# Patient Record
Sex: FEMALE | Race: WHITE | NOT HISPANIC OR LATINO | Employment: OTHER | URBAN - METROPOLITAN AREA
[De-identification: names, ages, dates, MRNs, and addresses within clinical notes are randomized per-mention and may not be internally consistent; named-entity substitution may affect disease eponyms.]

---

## 2017-02-14 ENCOUNTER — LAB (OUTPATIENT)
Dept: INTERNAL MEDICINE | Facility: CLINIC | Age: 67
End: 2017-02-14

## 2017-02-14 DIAGNOSIS — E78.1 PURE HYPERGLYCERIDEMIA: Primary | ICD-10-CM

## 2017-02-14 DIAGNOSIS — M85.80 OSTEOPENIA: ICD-10-CM

## 2017-02-14 DIAGNOSIS — Z00.00 ANNUAL PHYSICAL EXAM: ICD-10-CM

## 2017-02-14 DIAGNOSIS — R94.6 ABNORMAL FINDING ON THYROID FUNCTION TEST: ICD-10-CM

## 2017-02-14 DIAGNOSIS — R73.01 IMPAIRED FASTING GLUCOSE: ICD-10-CM

## 2017-02-14 LAB
ALBUMIN SERPL-MCNC: 4.4 G/DL (ref 3.2–4.8)
ALBUMIN/GLOB SERPL: 2.1 G/DL (ref 1.5–2.5)
ALP SERPL-CCNC: 56 U/L (ref 25–100)
ALT SERPL W P-5'-P-CCNC: 17 U/L (ref 7–40)
ANION GAP SERPL CALCULATED.3IONS-SCNC: 7 MMOL/L (ref 3–11)
ARTICHOKE IGE QN: 110 MG/DL (ref 0–130)
AST SERPL-CCNC: 18 U/L (ref 0–33)
BASOPHILS # BLD AUTO: 0.06 10*3/MM3 (ref 0–0.2)
BASOPHILS NFR BLD AUTO: 0.6 % (ref 0–1)
BILIRUB SERPL-MCNC: 0.5 MG/DL (ref 0.3–1.2)
BILIRUB UR QL STRIP: NEGATIVE
BUN BLD-MCNC: 13 MG/DL (ref 9–23)
BUN/CREAT SERPL: 18.6 (ref 7–25)
CALCIUM SPEC-SCNC: 9.9 MG/DL (ref 8.7–10.4)
CHLORIDE SERPL-SCNC: 101 MMOL/L (ref 99–109)
CHOLEST SERPL-MCNC: 215 MG/DL (ref 0–200)
CLARITY UR: CLEAR
CO2 SERPL-SCNC: 30 MMOL/L (ref 20–31)
COLOR UR: YELLOW
CREAT BLD-MCNC: 0.7 MG/DL (ref 0.6–1.3)
DEPRECATED RDW RBC AUTO: 46.1 FL (ref 37–54)
EOSINOPHIL # BLD AUTO: 0.94 10*3/MM3 (ref 0.1–0.3)
EOSINOPHIL NFR BLD AUTO: 8.8 % (ref 0–3)
ERYTHROCYTE [DISTWIDTH] IN BLOOD BY AUTOMATED COUNT: 13.1 % (ref 11.3–14.5)
GFR SERPL CREATININE-BSD FRML MDRD: 83 ML/MIN/1.73
GLOBULIN UR ELPH-MCNC: 2.1 GM/DL
GLUCOSE BLD-MCNC: 98 MG/DL (ref 70–100)
GLUCOSE UR STRIP-MCNC: NEGATIVE MG/DL
HBA1C MFR BLD: 6.1 % (ref 4.8–5.6)
HCT VFR BLD AUTO: 38.7 % (ref 34.5–44)
HCV AB SER DONR QL: NORMAL
HDLC SERPL-MCNC: 88 MG/DL (ref 40–60)
HGB BLD-MCNC: 12.7 G/DL (ref 11.5–15.5)
HGB UR QL STRIP.AUTO: NEGATIVE
IMM GRANULOCYTES # BLD: 0.05 10*3/MM3 (ref 0–0.03)
IMM GRANULOCYTES NFR BLD: 0.5 % (ref 0–0.6)
KETONES UR QL STRIP: NEGATIVE
LEUKOCYTE ESTERASE UR QL STRIP.AUTO: NEGATIVE
LYMPHOCYTES # BLD AUTO: 1.7 10*3/MM3 (ref 0.6–4.8)
LYMPHOCYTES NFR BLD AUTO: 15.9 % (ref 24–44)
MCH RBC QN AUTO: 31.6 PG (ref 27–31)
MCHC RBC AUTO-ENTMCNC: 32.8 G/DL (ref 32–36)
MCV RBC AUTO: 96.3 FL (ref 80–99)
MONOCYTES # BLD AUTO: 0.9 10*3/MM3 (ref 0–1)
MONOCYTES NFR BLD AUTO: 8.4 % (ref 0–12)
NEUTROPHILS # BLD AUTO: 7.02 10*3/MM3 (ref 1.5–8.3)
NEUTROPHILS NFR BLD AUTO: 65.8 % (ref 41–71)
NITRITE UR QL STRIP: NEGATIVE
PH UR STRIP.AUTO: 7 [PH] (ref 5–8)
PLATELET # BLD AUTO: 341 10*3/MM3 (ref 150–450)
PMV BLD AUTO: 10.5 FL (ref 6–12)
POTASSIUM BLD-SCNC: 4.3 MMOL/L (ref 3.5–5.5)
PROT SERPL-MCNC: 6.5 G/DL (ref 5.7–8.2)
PROT UR QL STRIP: NEGATIVE
RBC # BLD AUTO: 4.02 10*6/MM3 (ref 3.89–5.14)
SODIUM BLD-SCNC: 138 MMOL/L (ref 132–146)
SP GR UR STRIP: 1.01 (ref 1–1.03)
TRIGL SERPL-MCNC: 67 MG/DL (ref 0–150)
TSH SERPL DL<=0.05 MIU/L-ACNC: 3.9 MIU/ML (ref 0.35–5.35)
UROBILINOGEN UR QL STRIP: NORMAL
WBC NRBC COR # BLD: 10.67 10*3/MM3 (ref 3.5–10.8)

## 2017-02-14 PROCEDURE — 81003 URINALYSIS AUTO W/O SCOPE: CPT | Performed by: INTERNAL MEDICINE

## 2017-02-14 PROCEDURE — 83036 HEMOGLOBIN GLYCOSYLATED A1C: CPT | Performed by: INTERNAL MEDICINE

## 2017-02-14 PROCEDURE — 80053 COMPREHEN METABOLIC PANEL: CPT | Performed by: INTERNAL MEDICINE

## 2017-02-14 PROCEDURE — 84443 ASSAY THYROID STIM HORMONE: CPT | Performed by: INTERNAL MEDICINE

## 2017-02-14 PROCEDURE — 86803 HEPATITIS C AB TEST: CPT | Performed by: INTERNAL MEDICINE

## 2017-02-14 PROCEDURE — 80061 LIPID PANEL: CPT | Performed by: INTERNAL MEDICINE

## 2017-02-14 PROCEDURE — 85025 COMPLETE CBC W/AUTO DIFF WBC: CPT | Performed by: INTERNAL MEDICINE

## 2017-02-23 ENCOUNTER — OFFICE VISIT (OUTPATIENT)
Dept: INTERNAL MEDICINE | Facility: CLINIC | Age: 67
End: 2017-02-23

## 2017-02-23 VITALS
WEIGHT: 148 LBS | SYSTOLIC BLOOD PRESSURE: 120 MMHG | BODY MASS INDEX: 26.22 KG/M2 | DIASTOLIC BLOOD PRESSURE: 74 MMHG | HEIGHT: 63 IN

## 2017-02-23 DIAGNOSIS — R10.9 LEFT SIDED ABDOMINAL PAIN: ICD-10-CM

## 2017-02-23 DIAGNOSIS — M85.80 OSTEOPENIA: ICD-10-CM

## 2017-02-23 DIAGNOSIS — R94.6 ABNORMAL FINDING ON THYROID FUNCTION TEST: ICD-10-CM

## 2017-02-23 DIAGNOSIS — Z78.0 MENOPAUSE: ICD-10-CM

## 2017-02-23 DIAGNOSIS — Z00.00 MEDICARE ANNUAL WELLNESS VISIT, SUBSEQUENT: Primary | ICD-10-CM

## 2017-02-23 DIAGNOSIS — N39.41 URGE INCONTINENCE OF URINE: ICD-10-CM

## 2017-02-23 DIAGNOSIS — R73.01 IMPAIRED FASTING GLUCOSE: ICD-10-CM

## 2017-02-23 DIAGNOSIS — E78.1 PURE HYPERGLYCERIDEMIA: ICD-10-CM

## 2017-02-23 PROCEDURE — G0439 PPPS, SUBSEQ VISIT: HCPCS | Performed by: INTERNAL MEDICINE

## 2017-02-23 PROCEDURE — G0444 DEPRESSION SCREEN ANNUAL: HCPCS | Performed by: INTERNAL MEDICINE

## 2017-02-23 PROCEDURE — 99214 OFFICE O/P EST MOD 30 MIN: CPT | Performed by: INTERNAL MEDICINE

## 2017-02-23 PROCEDURE — 93000 ELECTROCARDIOGRAM COMPLETE: CPT | Performed by: INTERNAL MEDICINE

## 2017-02-23 RX ORDER — TOLTERODINE 4 MG/1
4 CAPSULE, EXTENDED RELEASE ORAL DAILY
Qty: 90 CAPSULE | Refills: 3 | Status: SHIPPED | OUTPATIENT
Start: 2017-02-23 | End: 2018-01-26 | Stop reason: SDUPTHER

## 2017-02-23 NOTE — ASSESSMENT & PLAN NOTE
BG control stable with A1C 6.1; encouraged reg phys activity to decr insulin resistance, moderation in unhealthy starches/sweets; f/u A1C in 6 mos

## 2017-02-23 NOTE — ASSESSMENT & PLAN NOTE
Health Maintenance - flu vaccine 10/16, Prevnar 2/16, PVX 2/14, Tdap 2012, Zostavax 1/16; mammogram 2/10/17 at ; no further Paps unless abnormalities; DEXA ordered (last 3/14); colonoscopy 4/18/16 with hyperplastic polyp, repeat 2026 per Dr. Knight; eye exam 1/17 with Dr. Nieto ([facility]); dental exam UTD 2/17, done every 6 mos    Consultants:  Patient Care Team:  Mirna Stack MD as PCP - General  Aspen Dodson MD as Consulting Physician (Rheumatology)  Nika Gibbs MD as Consulting Physician (Gastroenterology)  Palmer Resendiz MD as Consulting Physician (Medical Oncology)  Delmis Parker MD as Consulting Physician (Dermatology)  Jono Dawson MD (Surgical Oncology)  Holden Knight MD as Consulting Physician (Gastroenterology)

## 2017-02-23 NOTE — PROGRESS NOTES
ANNUAL WELLNESS VISIT    DRUG AND ALCOHOL USE      no alcohol use, no tobacco use and caffeine intake: 1 cups of caffeinated coffee per day    DIET AND PHYSICAL ACTIVITY     Diet: general    Exercise: frequently - [employer], 3-4 days a week    Exercise Details: walking, weight training, cardio and yoga    MOOD DISORDER AND COGNITIVE SCREENING   Depression Screening Tool Used yes - see PHQ-9   Anxiety Screening Tool Used yes     Mini-Cog Performed   Yes    1. Tell Patient 3 Words table,car,book    2. Administer Clock Test normal    3. Recall 3 words  table,car,book    4. Number Correct Items 3    FUNCTIONAL ABILITY AND LEVEL OF SAFETY   Hearing Mild hearing loss     Wears Hearing Aids No       Current Activities Independent      none  - see Funct/Cog Status Intake     Fall Risk Assessment       Has difficulty with walking or balance  No         Timed Up and Go (TUG) Test  5 sec.       If >12 sec, normal    ADVANCED DIRECTIVE has an advanced directive - a copy has been provided and is in file    PAIN SCREENING Do you have pain right now? Yes      If so, 1-10 scale: 2     Intermittent     Do you have pain every day? Yes      Probable chronic pain: No     Recent Hospitalizations:  No recent hospitalization(s)..     MEDICATION REVIEW   - updated and reviewed (see Medication List).   - reviewed for potentially harmful drug-disease interactions in the elderly.   - reviewed for high risk medications in the elderly.   - aspirin use: Not Indicated   __________________________________________________  Chief Complaint   Patient presents with   • Medicare Wellness       History of Present Illness  66 y.o.  woman presents for updated wellness visit.    Review of Systems  Denies headaches, visual changes, CP, palpitations, SOB, cough, n/v/d, difficulty with urination, numbness/tingling, falls, mood changes, lightheadedness, hearing changes, rashes.    ROS (+) for left upper to mid abd pain that has been present for  "years but increasing in frequency recently.  Notes it is different from colon/abd spasm due to IBS, which previously would be resolved with hyoscyamine SL.  This pain doesn't have anything that makes it better or worse.  Was told by Dr. Resendiz that it could be due to scar tissue from previous ovarian surgery.  Notes no change in sxs with BMs, urination, eating or movements.  No associated n/v/d.  No blood in stools or urine.  No vaginal bleeding or discharge.  Again ongoing for years but just increased in frequency, notes low grade discomfort, sometimes stabbing in nature but mild.     All other ROS reviewed and negative.    [facility]  The following portions of the patient's history were reviewed and updated as appropriate: allergies, current medications, past family history, past medical history, past social history, past surgical history and problem list.      Current Outpatient Prescriptions:   •  Cholecalciferol (VITAMIN D) 1000 UNITS tablet, Take 1,000 Units by mouth Daily., Disp: , Rfl:   •  docusate sodium (STOOL SOFTENER) 100 MG capsule, Take 1 capsule by mouth daily as needed., Disp: , Rfl:   •  hyoscyamine sulfate (ANASPAZ) 0.125 MG tablet dispersible disintegrating tablet, Take  by mouth as needed., Disp: , Rfl:   •  Multiple Vitamin (MULTI VITAMIN DAILY PO), Take  by mouth daily., Disp: , Rfl:   •  nabumetone (RELAFEN) 750 MG tablet, Take  by mouth 2 (two) times a day., Disp: , Rfl:   •  psyllium (METAMUCIL) 0.52 G capsule, Take 1 capsule by mouth daily as needed., Disp: , Rfl:   •  tolterodine LA (DETROL LA) 4 MG 24 hr capsule, Take 1 capsule by mouth Daily., Disp: 90 capsule, Rfl: 3    Visit Vitals   • /74   • Ht 63\" (160 cm)   • Wt 148 lb (67.1 kg)   • BMI 26.22 kg/m2       Physical Exam   Constitutional: She is oriented to person, place, and time. She appears well-developed and well-nourished.   HENT:   Head: Normocephalic.   Right Ear: Hearing and tympanic membrane normal.   Left Ear: Hearing " and tympanic membrane normal.   Nose: Nose normal.   Mouth/Throat: Oropharynx is clear and moist and mucous membranes are normal. No oropharyngeal exudate.   Finger rubbing intact bilaterally   Eyes: Conjunctivae and EOM are normal. Pupils are equal, round, and reactive to light.   Neck: Normal range of motion. Neck supple. Carotid bruit is not present. No thyromegaly present.   Cardiovascular: Normal rate and regular rhythm.    Murmur (II/VI LUSB) heard.  Pulmonary/Chest: Effort normal and breath sounds normal. No respiratory distress.   Abdominal: Soft. Bowel sounds are normal. She exhibits no distension and no mass. There is no hepatosplenomegaly. There is no tenderness.   Genitourinary:   Genitourinary Comments: Breast exam unremarkable without masses, skin changes, nipple discharge, or axillary adenopathy.     Musculoskeletal: Normal range of motion. She exhibits no edema.   Lymphadenopathy:     She has no cervical adenopathy.   Neurological: She is alert and oriented to person, place, and time. She has normal strength and normal reflexes. She displays normal reflexes. No cranial nerve deficit or sensory deficit.   Skin: Skin is warm and dry. No rash noted.   Psychiatric: She has a normal mood and affect. Her behavior is normal.   Nursing note and vitals reviewed.      LABS  Results for orders placed or performed in visit on 02/14/17   Comprehensive Metabolic Panel   Result Value Ref Range    Glucose 98 70 - 100 mg/dL    BUN 13 9 - 23 mg/dL    Creatinine 0.70 0.60 - 1.30 mg/dL    Sodium 138 132 - 146 mmol/L    Potassium 4.3 3.5 - 5.5 mmol/L    Chloride 101 99 - 109 mmol/L    CO2 30.0 20.0 - 31.0 mmol/L    Calcium 9.9 8.7 - 10.4 mg/dL    Total Protein 6.5 5.7 - 8.2 g/dL    Albumin 4.40 3.20 - 4.80 g/dL    ALT (SGPT) 17 7 - 40 U/L    AST (SGOT) 18 0 - 33 U/L    Alkaline Phosphatase 56 25 - 100 U/L    Total Bilirubin 0.5 0.3 - 1.2 mg/dL    eGFR Non African Amer 83 >60 mL/min/1.73    Globulin 2.1 gm/dL    A/G Ratio  2.1 1.5 - 2.5 g/dL    BUN/Creatinine Ratio 18.6 7.0 - 25.0    Anion Gap 7.0 3.0 - 11.0 mmol/L   TSH   Result Value Ref Range    TSH 3.900 0.350 - 5.350 mIU/mL   Lipid Panel   Result Value Ref Range    Total Cholesterol 215 (H) 0 - 200 mg/dL    Triglycerides 67 0 - 150 mg/dL    HDL Cholesterol 88 (H) 40 - 60 mg/dL    LDL Cholesterol  110 0 - 130 mg/dL   Urinalysis With / Microscopic If Indicated   Result Value Ref Range    Color, UA Yellow Yellow, Straw    Appearance, UA Clear Clear    pH, UA 7.0 5.0 - 8.0    Specific Gravity, UA 1.010 1.005 - 1.030    Glucose, UA Negative Negative    Ketones, UA Negative Negative    Bilirubin, UA Negative Negative    Blood, UA Negative Negative    Protein, UA Negative Negative    Leuk Esterase, UA Negative Negative    Nitrite, UA Negative Negative    Urobilinogen, UA 0.2 E.U./dL 0.2 - 1.0 E.U./dL   Hemoglobin A1c   Result Value Ref Range    Hemoglobin A1C 6.10 (H) 4.80 - 5.60 %   Hepatitis C Antibody   Result Value Ref Range    Hepatitis C Ab Non-Reactive Non-Reactive   CBC Auto Differential   Result Value Ref Range    WBC 10.67 3.50 - 10.80 10*3/mm3    RBC 4.02 3.89 - 5.14 10*6/mm3    Hemoglobin 12.7 11.5 - 15.5 g/dL    Hematocrit 38.7 34.5 - 44.0 %    MCV 96.3 80.0 - 99.0 fL    MCH 31.6 (H) 27.0 - 31.0 pg    MCHC 32.8 32.0 - 36.0 g/dL    RDW 13.1 11.3 - 14.5 %    RDW-SD 46.1 37.0 - 54.0 fl    MPV 10.5 6.0 - 12.0 fL    Platelets 341 150 - 450 10*3/mm3    Neutrophil % 65.8 41.0 - 71.0 %    Lymphocyte % 15.9 (L) 24.0 - 44.0 %    Monocyte % 8.4 0.0 - 12.0 %    Eosinophil % 8.8 (H) 0.0 - 3.0 %    Basophil % 0.6 0.0 - 1.0 %    Immature Grans % 0.5 0.0 - 0.6 %    Neutrophils, Absolute 7.02 1.50 - 8.30 10*3/mm3    Lymphocytes, Absolute 1.70 0.60 - 4.80 10*3/mm3    Monocytes, Absolute 0.90 0.00 - 1.00 10*3/mm3    Eosinophils, Absolute 0.94 (H) 0.10 - 0.30 10*3/mm3    Basophils, Absolute 0.06 0.00 - 0.20 10*3/mm3    Immature Grans, Absolute 0.05 (H) 0.00 - 0.03 10*3/mm3       ECG 12  Lead  Date/Time: 2/23/2017 10:23 AM  Performed by: AIDEN STACK  Authorized by: AIDEN STACK   Comparison: compared with previous ECG from 2/22/2016  Comparison to previous ECG: New inverted T wave V2 (also in V1)  Rhythm: sinus rhythm  Rate: normal  BPM: 68  ST Segments: ST segments normal  QRS axis: normal  Other findings comments: inverted T waves V 1-2 (new in V2); left atrial abnormality  Clinical impression: non-specific ECG          ASSESSMENT/PLAN  Problem List Items Addressed This Visit     Abnormal finding on thyroid function test     Current normal TSH: no meds         Hyperlipidemia     Lipids stable and at goal; no meds         Relevant Orders    ECG 12 Lead    Impaired fasting glucose     BG control stable with A1C 6.1; encouraged reg phys activity to decr insulin resistance, moderation in unhealthy starches/sweets; f/u A1C in 6 mos           Menopause    Relevant Orders    DEXA Bone Density Axial    Osteopenia     Calcium and vitamin D supplementation with weight-bearing exercise; DEXA ordered            Medicare annual wellness visit, subsequent - Primary     Health Maintenance - flu vaccine 10/16, Prevnar 2/16, PVX 2/14, Tdap 2012, Zostavax 1/16; mammogram 2/10/17 at ; no further Paps unless abnormalities; DEXA ordered (last 3/14); colonoscopy 4/18/16 with hyperplastic polyp, repeat 2026 per Dr. Knight; eye exam 1/17 with Dr. Nieto ([facility]); dental exam UTD 2/17, done every 6 mos    Consultants:  Patient Care Team:  Aiden Stack MD as PCP - General  Aspen Dodson MD as Consulting Physician (Rheumatology)  Nika Gibbs MD as Consulting Physician (Gastroenterology)  Palmer Resendiz MD as Consulting Physician (Medical Oncology)  Delmis Parker MD as Consulting Physician (Dermatology)  Jono Dawson MD (Surgical Oncology)  Holden Knight MD as Consulting Physician (Gastroenterology)                     Urge incontinence of urine    Relevant Medications    tolterodine LA  (DETROL LA) 4 MG 24 hr capsule      Other Visit Diagnoses     Left sided abdominal pain        chronic but increasing in freq; could be due to scar tissue from oophorectomy; colonosc nl 4/16; check CT abd due to change in frequency    Relevant Orders    CT Abdomen With & Without Contrast          FOLLOW-UP  RTC 6 mos with A1C    Electronically signed by:    Mirna Stack MD  02/23/2017

## 2017-03-01 ENCOUNTER — HOSPITAL ENCOUNTER (OUTPATIENT)
Dept: CT IMAGING | Facility: HOSPITAL | Age: 67
Discharge: HOME | End: 2017-03-01
Attending: INTERNAL MEDICINE | Admitting: INTERNAL MEDICINE

## 2017-03-01 DIAGNOSIS — R10.9 LEFT SIDED ABDOMINAL PAIN: ICD-10-CM

## 2017-03-01 PROCEDURE — 74170 CT ABD WO CNTRST FLWD CNTRST: CPT

## 2017-03-01 PROCEDURE — 0 IOPAMIDOL 61 % SOLUTION: Performed by: INTERNAL MEDICINE

## 2017-03-01 RX ADMIN — BARIUM SULFATE 450 ML: 21 SUSPENSION ORAL at 12:00

## 2017-03-01 RX ADMIN — IOPAMIDOL 85 ML: 612 INJECTION, SOLUTION INTRAVENOUS at 12:00

## 2017-03-02 NOTE — PROGRESS NOTES
CT of the abdomen is normal - no masses, no signs of infection, no abnormalities to explain abdominal but also reassuring; no further evaluation needed at this time - just continue to monitor sxs

## 2017-03-16 ENCOUNTER — APPOINTMENT (OUTPATIENT)
Dept: BONE DENSITY | Facility: HOSPITAL | Age: 67
End: 2017-03-16
Attending: INTERNAL MEDICINE

## 2017-03-16 ENCOUNTER — HOSPITAL ENCOUNTER (OUTPATIENT)
Dept: BONE DENSITY | Facility: HOSPITAL | Age: 67
Discharge: HOME | End: 2017-03-16
Attending: INTERNAL MEDICINE | Admitting: INTERNAL MEDICINE

## 2017-03-16 DIAGNOSIS — Z78.0 MENOPAUSE: ICD-10-CM

## 2017-03-16 PROCEDURE — 77080 DXA BONE DENSITY AXIAL: CPT | Performed by: RADIOLOGY

## 2017-03-16 PROCEDURE — 77080 DXA BONE DENSITY AXIAL: CPT

## 2017-03-16 NOTE — PROGRESS NOTES
Dear Ms. Estrada,    Thank you for obtaining your DEXA (bone density) scan.  I have received those results and would like to review them with you.      Your bone density remains in the osteopenic range.  This is between normal and osteoporosis and is stable as compared to your last DEXA in 2013.  This indicates that your risk of a major fracture in the next 10 years is 9.3%.    Please maintain regular calcium and vitamin D supplementation.  Calcium intake should be 1000mg per day between diet and supplements.  Weight bearing exercise is good for bone health as well.    We should plan to repeat your DEXA in 3 years.  Please let me know if you have any questions or concerns regarding these results.        Sincerely,  Mirna Stack MD

## 2017-08-22 ENCOUNTER — OFFICE VISIT (OUTPATIENT)
Dept: INTERNAL MEDICINE | Facility: CLINIC | Age: 67
End: 2017-08-22

## 2017-08-22 VITALS — BODY MASS INDEX: 26.04 KG/M2 | DIASTOLIC BLOOD PRESSURE: 78 MMHG | WEIGHT: 147 LBS | SYSTOLIC BLOOD PRESSURE: 126 MMHG

## 2017-08-22 DIAGNOSIS — R94.6 ABNORMAL FINDING ON THYROID FUNCTION TEST: ICD-10-CM

## 2017-08-22 DIAGNOSIS — E78.00 PURE HYPERCHOLESTEROLEMIA: ICD-10-CM

## 2017-08-22 DIAGNOSIS — R73.01 IMPAIRED FASTING GLUCOSE: Primary | ICD-10-CM

## 2017-08-22 DIAGNOSIS — Z00.00 ROUTINE HEALTH MAINTENANCE: ICD-10-CM

## 2017-08-22 LAB — HBA1C MFR BLD: 6 %

## 2017-08-22 PROCEDURE — 83036 HEMOGLOBIN GLYCOSYLATED A1C: CPT | Performed by: INTERNAL MEDICINE

## 2017-08-22 PROCEDURE — 99213 OFFICE O/P EST LOW 20 MIN: CPT | Performed by: INTERNAL MEDICINE

## 2017-08-22 NOTE — PROGRESS NOTES
Chief Complaint   Patient presents with   • Follow-up     Impaired fasting glucose        History of Present Illness  66 y.o.  woman presents for sugar follow-up.  No complaints or concerns today.  Reports staying active.    Review of Systems  Denies CP, palpitations, SOB, lightheadedness, numbness/tingling. All other ROS reviewed and negative.    [facility]  The following portions of the patient's history were reviewed and updated as appropriate: allergies, current medications, past family history, past medical history, past social history, past surgical history and problem list.      Current Outpatient Prescriptions:   •  Cholecalciferol (VITAMIN D) 1000 UNITS tablet, Take 1,000 Units by mouth Daily., Disp: , Rfl:   •  docusate sodium (STOOL SOFTENER) 100 MG capsule, Take 1 capsule by mouth daily as needed., Disp: , Rfl:   •  hyoscyamine sulfate (ANASPAZ) 0.125 MG tablet dispersible disintegrating tablet, Take  by mouth as needed., Disp: , Rfl:   •  Multiple Vitamin (MULTI VITAMIN DAILY PO), Take  by mouth daily., Disp: , Rfl:   •  nabumetone (RELAFEN) 750 MG tablet, Take  by mouth 2 (two) times a day., Disp: , Rfl:   •  psyllium (METAMUCIL) 0.52 G capsule, Take 1 capsule by mouth daily as needed., Disp: , Rfl:   •  tolterodine LA (DETROL LA) 4 MG 24 hr capsule, Take 1 capsule by mouth Daily., Disp: 90 capsule, Rfl: 3    VITALS:  /78  Wt 147 lb (66.7 kg)  BMI 26.04 kg/m2    Physical Exam   Constitutional: She is oriented to person, place, and time. She appears well-developed and well-nourished.   Eyes: Conjunctivae and EOM are normal.   Cardiovascular: Normal rate, regular rhythm and normal heart sounds.    Pulmonary/Chest: Effort normal and breath sounds normal. No respiratory distress. She has no wheezes. She has no rales.   Abdominal: Soft. Bowel sounds are normal.   Neurological: She is alert and oriented to person, place, and time.   Psychiatric: She has a normal mood and affect. Her behavior is  normal.   Nursing note and vitals reviewed.    LABS  Results for orders placed or performed in visit on 08/22/17   POC Glycosylated Hemoglobin (Hb A1C)   Result Value Ref Range    Hemoglobin A1C 6.0 %     2/17 A1C 6.1    ASSESSMENT/PLAN  Problem List Items Addressed This Visit     Impaired fasting glucose - Primary     Stable BG control with A1C 6.0; encouraged reg phys activity to decr insulin resistance, moderation in unhealthy starches/sweets; f/u A1C in 6 mos           Relevant Orders    POC Glycosylated Hemoglobin (Hb A1C) (Completed)          FOLLOW-UP  RTC for next wellness 2/26/18 as scheduled; fasting labs the week prior to appt (CBC, CMP, TSH, lipids, UA/micro, A1C, FT4) - escribed    Electronically signed by:    Mirna Stack MD  08/22/2017

## 2017-08-22 NOTE — ASSESSMENT & PLAN NOTE
Stable BG control with A1C 6.0; encouraged reg phys activity to decr insulin resistance, moderation in unhealthy starches/sweets; f/u A1C in 6 mos

## 2017-09-29 PROBLEM — H90.5 RIGHT SNHL: Status: ACTIVE | Noted: 2017-09-29

## 2017-10-12 ENCOUNTER — FLU SHOT (OUTPATIENT)
Dept: INTERNAL MEDICINE | Facility: CLINIC | Age: 67
End: 2017-10-12

## 2017-10-12 PROCEDURE — G0008 ADMIN INFLUENZA VIRUS VAC: HCPCS | Performed by: INTERNAL MEDICINE

## 2017-10-12 PROCEDURE — 90662 IIV NO PRSV INCREASED AG IM: CPT | Performed by: INTERNAL MEDICINE

## 2018-01-26 ENCOUNTER — OFFICE VISIT (OUTPATIENT)
Dept: INTERNAL MEDICINE | Facility: CLINIC | Age: 68
End: 2018-01-26

## 2018-01-26 VITALS
WEIGHT: 164 LBS | BODY MASS INDEX: 29.06 KG/M2 | OXYGEN SATURATION: 99 % | HEART RATE: 88 BPM | SYSTOLIC BLOOD PRESSURE: 128 MMHG | DIASTOLIC BLOOD PRESSURE: 68 MMHG | HEIGHT: 63 IN

## 2018-01-26 DIAGNOSIS — E78.00 PURE HYPERCHOLESTEROLEMIA: ICD-10-CM

## 2018-01-26 DIAGNOSIS — N39.41 URGE INCONTINENCE OF URINE: ICD-10-CM

## 2018-01-26 DIAGNOSIS — R94.6 ABNORMAL FINDING ON THYROID FUNCTION TEST: ICD-10-CM

## 2018-01-26 DIAGNOSIS — K21.9 GASTROESOPHAGEAL REFLUX DISEASE, ESOPHAGITIS PRESENCE NOT SPECIFIED: ICD-10-CM

## 2018-01-26 DIAGNOSIS — M54.2 NECK PAIN: ICD-10-CM

## 2018-01-26 DIAGNOSIS — N30.01 ACUTE CYSTITIS WITH HEMATURIA: Primary | ICD-10-CM

## 2018-01-26 DIAGNOSIS — R73.01 IMPAIRED FASTING GLUCOSE: ICD-10-CM

## 2018-01-26 DIAGNOSIS — D70.9 NEUTROPENIA, UNSPECIFIED TYPE (HCC): ICD-10-CM

## 2018-01-26 LAB
BILIRUB BLD-MCNC: NEGATIVE MG/DL
CLARITY, POC: CLEAR
COLOR UR: YELLOW
GLUCOSE UR STRIP-MCNC: NEGATIVE MG/DL
KETONES UR QL: NEGATIVE
LEUKOCYTE EST, POC: ABNORMAL
NITRITE UR-MCNC: NEGATIVE MG/ML
PH UR: 7 [PH] (ref 5–8)
PROT UR STRIP-MCNC: NEGATIVE MG/DL
RBC # UR STRIP: ABNORMAL /UL
SP GR UR: 1.01 (ref 1–1.03)
T4 FREE SERPL-MCNC: 1.36 NG/DL (ref 0.89–1.76)
TSH SERPL DL<=0.05 MIU/L-ACNC: 2.98 MIU/ML (ref 0.35–5.35)
UROBILINOGEN UR QL: NORMAL

## 2018-01-26 PROCEDURE — 86800 THYROGLOBULIN ANTIBODY: CPT | Performed by: INTERNAL MEDICINE

## 2018-01-26 PROCEDURE — 86376 MICROSOMAL ANTIBODY EACH: CPT | Performed by: INTERNAL MEDICINE

## 2018-01-26 PROCEDURE — 84445 ASSAY OF TSI GLOBULIN: CPT | Performed by: INTERNAL MEDICINE

## 2018-01-26 PROCEDURE — 84439 ASSAY OF FREE THYROXINE: CPT | Performed by: INTERNAL MEDICINE

## 2018-01-26 PROCEDURE — 87086 URINE CULTURE/COLONY COUNT: CPT | Performed by: INTERNAL MEDICINE

## 2018-01-26 PROCEDURE — 87077 CULTURE AEROBIC IDENTIFY: CPT | Performed by: INTERNAL MEDICINE

## 2018-01-26 PROCEDURE — 99214 OFFICE O/P EST MOD 30 MIN: CPT | Performed by: INTERNAL MEDICINE

## 2018-01-26 PROCEDURE — 87186 SC STD MICRODIL/AGAR DIL: CPT | Performed by: INTERNAL MEDICINE

## 2018-01-26 PROCEDURE — 84443 ASSAY THYROID STIM HORMONE: CPT | Performed by: INTERNAL MEDICINE

## 2018-01-26 PROCEDURE — 81003 URINALYSIS AUTO W/O SCOPE: CPT | Performed by: INTERNAL MEDICINE

## 2018-01-26 RX ORDER — CIPROFLOXACIN 250 MG/1
250 TABLET, FILM COATED ORAL EVERY 12 HOURS SCHEDULED
Qty: 10 TABLET | Refills: 0 | Status: SHIPPED | OUTPATIENT
Start: 2018-01-26 | End: 2018-01-31

## 2018-01-26 RX ORDER — TOLTERODINE 4 MG/1
4 CAPSULE, EXTENDED RELEASE ORAL DAILY
Qty: 90 CAPSULE | Refills: 3 | Status: SHIPPED | OUTPATIENT
Start: 2018-01-26 | End: 2019-01-13 | Stop reason: SDUPTHER

## 2018-01-26 NOTE — PROGRESS NOTES
"Chief Complaint   Patient presents with   • Difficulty Urinating     painful urinating    • Blood in Urine     sx started 1 week ago.    • Heartburn     Has \"pressure\" in chest, and has been burping a lot with some \"burning\" in throat. Sx started 3-4 months ago.        History of Present Illness  67 y.o.  woman presents for further eval of urinary sxs.  HPI started 1 week ago with palm hand pain only occurring with urination.  Then 4 days ago, developed pain with urination and blood in urine, no blood clots.  Denies blood in urine today.  Denies fevers.  Denies any new abd pain with baseline pain since CT last year.  Notes assoc'd urinary urgency and frequency.    Review of Systems  ROS (+) for 3-4 months of fluid coming into the throat causing throat pain and hurting in chest.  Denies pain with swallowing but notes pain with touching outer part of neck at the throat 2-3 months ago.  Notes no h/o indigestion or reflux.  Has taken few doses of ranitidine and PPI, which seemed to help.    Notes \"zapped\" feeling only since urinary sxs.  No h/o thyroid disease.     Requesting RF for tolterodine.  All other ROS reviewed and negative.    [facility]  The following portions of the patient's history were reviewed and updated as appropriate: allergies, current medications, past family history, past medical history, past social history, past surgical history and problem list.      Current Outpatient Prescriptions:   •  Cholecalciferol (VITAMIN D) 1000 UNITS QD  •  docusate sodium (STOOL SOFTENER) 100 MG QD prn  •  hyoscyamine sulfate (ANASPAZ) 0.125 MG tablet prn  •  Multiple Vitamin (MULTI VITAMIN DAILY PO), QD  •  nabumetone (RELAFEN) 750 MG tablet, BID prn  •  psyllium (METAMUCIL) 0.52 G capsule, QD  •  tolterodine LA (DETROL LA) 4 MG 24 hr QD    VITALS:  /68  Pulse 88  Ht 160 cm (63\")  Wt 74.4 kg (164 lb)  SpO2 99%  BMI 29.05 kg/m2    Physical Exam   Constitutional: She is oriented to person, place, and " time. She appears well-developed and well-nourished.   Eyes: Conjunctivae and EOM are normal.   Neck: Trachea normal and normal range of motion. Neck supple. Carotid bruit is not present. No thyroid mass and no thyromegaly present.       Cardiovascular: Normal rate, regular rhythm and normal heart sounds.    Pulmonary/Chest: Effort normal and breath sounds normal. No respiratory distress.   Abdominal: Soft. Bowel sounds are normal. She exhibits no distension. There is no tenderness. There is no rebound, no guarding and no CVA tenderness.   Musculoskeletal: She exhibits deformity (arthritic changes bilat fingers).   Neck FROM   Neurological: She is alert and oriented to person, place, and time.   Psychiatric: She has a normal mood and affect. Her behavior is normal.   Nursing note and vitals reviewed.    LABS  Results for orders placed or performed in visit on 01/26/18   TSH   Result Value Ref Range    TSH 2.982 0.350 - 5.350 mIU/mL   T4, Free   Result Value Ref Range    Free T4 1.36 0.89 - 1.76 ng/dL   POCT urinalysis dipstick, automated   Result Value Ref Range    Color Yellow Yellow, Straw, Dark Yellow, [place]    Clarity, UA Clear Clear    Glucose, UA Negative Negative, 1000 mg/dL (3+) mg/dL    Bilirubin Negative Negative    Ketones, UA Negative Negative    Specific Gravity  1.015 1.005 - 1.030    Blood, UA 50 Suleman/ul (A) Negative    pH, Urine 7.0 5.0 - 8.0    Protein, POC Negative Negative mg/dL    Urobilinogen, UA Normal Normal    Leukocytes 500 Gregorio/ul (A) Negative    Nitrite, UA Negative Negative       ASSESSMENT/PLAN  Problem List Items Addressed This Visit     Abnormal finding on thyroid function test     update TFTs tato with sxs concerning for thyroiditis         Relevant Orders    TSH (Completed)    T4, Free (Completed)    Thyroid Peroxidase Antibody    Thyroid Antibodies    Thyroid Stimulating Immunoglobulin    Urge incontinence of urine     Renew tolterodine LA 4mg QD #90,3 RF         Relevant Medications     tolterodine LA (DETROL LA) 4 MG 24 hr capsule    GERD (gastroesophageal reflux disease)     Reviewed minimizing food triggers (reviewed categories), staying upright for at least 2-3 hours after eating, and proceeding with ranitidine 150mg BID (or famotidine 20mg BID); f/u in 2 wks           Neck pain     Ant neck pain suggestive of thyroiditis; check TFTs and thyr antibodies as well as thyr u/s for further evaluation         Relevant Orders    TSH (Completed)    T4, Free (Completed)    Thyroid Peroxidase Antibody    Thyroid Antibodies    Thyroid Stimulating Immunoglobulin      Other Visit Diagnoses     UTI    -  Primary    symptomatic with palm pain, hematuria, urin urgency/frequency; drink lots of water and RX cipro 250mg BID x 5d #10, 0RF; ucx ordered    Relevant Medications    tolterodine LA (DETROL LA) 4 MG 24 hr capsule    ciprofloxacin (CIPRO) 250 MG tablet    Other Relevant Orders    POCT urinalysis dipstick, automated (Completed)    Urine Culture - Urine, Urine, Clean Catch          FOLLOW-UP  1. Health maintenance - flu vacc 12/17  2. RTC for next wellness 2/26/18 as scheduled; fasting labs the week prior to appt (CBC, CMP, lipids, UA/micro, A1C)    Electronically signed by:    Mirna Stack MD  01/26/2018

## 2018-01-27 NOTE — ASSESSMENT & PLAN NOTE
Ant neck pain suggestive of thyroiditis; check TFTs and thyr antibodies as well as thyr u/s for further evaluation

## 2018-01-27 NOTE — ASSESSMENT & PLAN NOTE
Reviewed minimizing food triggers (reviewed categories), staying upright for at least 2-3 hours after eating, and proceeding with ranitidine 150mg BID (or famotidine 20mg BID); f/u in 2 wks

## 2018-01-29 LAB
BACTERIA SPEC AEROBE CULT: ABNORMAL
BACTERIA SPEC AEROBE CULT: ABNORMAL
THYROGLOB AB SERPL-ACNC: <1 IU/ML (ref 0–0.9)
THYROPEROXIDASE AB SERPL-ACNC: 7 IU/ML (ref 0–34)

## 2018-01-29 NOTE — PROGRESS NOTES
UTI is sensitive to the cipro prescribed; pls cont to drink lots of water and finish all 5 days of abx

## 2018-01-30 ENCOUNTER — HOSPITAL ENCOUNTER (OUTPATIENT)
Dept: ULTRASOUND IMAGING | Facility: HOSPITAL | Age: 68
Discharge: HOME | End: 2018-01-30
Attending: INTERNAL MEDICINE | Admitting: INTERNAL MEDICINE

## 2018-01-30 DIAGNOSIS — R94.6 ABNORMAL FINDING ON THYROID FUNCTION TEST: ICD-10-CM

## 2018-01-30 DIAGNOSIS — M54.2 NECK PAIN: ICD-10-CM

## 2018-01-30 PROCEDURE — 76536 US EXAM OF HEAD AND NECK: CPT

## 2018-02-03 LAB — TSI ACT/NOR SER: 62 % (ref 0–139)

## 2018-02-20 ENCOUNTER — LAB (OUTPATIENT)
Dept: INTERNAL MEDICINE | Facility: CLINIC | Age: 68
End: 2018-02-20

## 2018-02-20 DIAGNOSIS — E78.00 PURE HYPERCHOLESTEROLEMIA: ICD-10-CM

## 2018-02-20 DIAGNOSIS — R73.01 IMPAIRED FASTING GLUCOSE: ICD-10-CM

## 2018-02-20 DIAGNOSIS — D70.9 NEUTROPENIA, UNSPECIFIED TYPE (HCC): ICD-10-CM

## 2018-02-20 LAB
ALBUMIN SERPL-MCNC: 4.4 G/DL (ref 3.2–4.8)
ALBUMIN/GLOB SERPL: 2.3 G/DL (ref 1.5–2.5)
ALP SERPL-CCNC: 50 U/L (ref 25–100)
ALT SERPL W P-5'-P-CCNC: 22 U/L (ref 7–40)
ANION GAP SERPL CALCULATED.3IONS-SCNC: 6 MMOL/L (ref 3–11)
ARTICHOKE IGE QN: 119 MG/DL (ref 0–130)
AST SERPL-CCNC: 23 U/L (ref 0–33)
BASOPHILS # BLD AUTO: 0.06 10*3/MM3 (ref 0–0.2)
BASOPHILS NFR BLD AUTO: 1.1 % (ref 0–1)
BILIRUB SERPL-MCNC: 0.4 MG/DL (ref 0.3–1.2)
BUN BLD-MCNC: 16 MG/DL (ref 9–23)
BUN/CREAT SERPL: 22.9 (ref 7–25)
CALCIUM SPEC-SCNC: 9.5 MG/DL (ref 8.7–10.4)
CHLORIDE SERPL-SCNC: 103 MMOL/L (ref 99–109)
CHOLEST SERPL-MCNC: 228 MG/DL (ref 0–200)
CO2 SERPL-SCNC: 30 MMOL/L (ref 20–31)
CREAT BLD-MCNC: 0.7 MG/DL (ref 0.6–1.3)
DEPRECATED RDW RBC AUTO: 47.2 FL (ref 37–54)
EOSINOPHIL # BLD AUTO: 0.98 10*3/MM3 (ref 0–0.3)
EOSINOPHIL NFR BLD AUTO: 17.3 % (ref 0–3)
ERYTHROCYTE [DISTWIDTH] IN BLOOD BY AUTOMATED COUNT: 13.6 % (ref 11.3–14.5)
GFR SERPL CREATININE-BSD FRML MDRD: 83 ML/MIN/1.73
GLOBULIN UR ELPH-MCNC: 1.9 GM/DL
GLUCOSE BLD-MCNC: 98 MG/DL (ref 70–100)
HBA1C MFR BLD: 6.1 % (ref 4.8–5.6)
HCT VFR BLD AUTO: 37.6 % (ref 34.5–44)
HDLC SERPL-MCNC: 91 MG/DL (ref 40–60)
HGB BLD-MCNC: 12.3 G/DL (ref 11.5–15.5)
IMM GRANULOCYTES # BLD: 0.02 10*3/MM3 (ref 0–0.03)
IMM GRANULOCYTES NFR BLD: 0.4 % (ref 0–0.6)
LYMPHOCYTES # BLD AUTO: 1.77 10*3/MM3 (ref 0.6–4.8)
LYMPHOCYTES NFR BLD AUTO: 31.2 % (ref 24–44)
MCH RBC QN AUTO: 31 PG (ref 27–31)
MCHC RBC AUTO-ENTMCNC: 32.7 G/DL (ref 32–36)
MCV RBC AUTO: 94.7 FL (ref 80–99)
MONOCYTES # BLD AUTO: 0.58 10*3/MM3 (ref 0–1)
MONOCYTES NFR BLD AUTO: 10.2 % (ref 0–12)
NEUTROPHILS # BLD AUTO: 2.27 10*3/MM3 (ref 1.5–8.3)
NEUTROPHILS NFR BLD AUTO: 39.8 % (ref 41–71)
PLAT MORPH BLD: NORMAL
PLATELET # BLD AUTO: 330 10*3/MM3 (ref 150–450)
PMV BLD AUTO: 10.6 FL (ref 6–12)
POTASSIUM BLD-SCNC: 4.9 MMOL/L (ref 3.5–5.5)
PROT SERPL-MCNC: 6.3 G/DL (ref 5.7–8.2)
RBC # BLD AUTO: 3.97 10*6/MM3 (ref 3.89–5.14)
RBC MORPH BLD: NORMAL
SODIUM BLD-SCNC: 139 MMOL/L (ref 132–146)
TRIGL SERPL-MCNC: 49 MG/DL (ref 0–150)
WBC MORPH BLD: NORMAL
WBC NRBC COR # BLD: 5.68 10*3/MM3 (ref 3.5–10.8)

## 2018-02-20 PROCEDURE — 85025 COMPLETE CBC W/AUTO DIFF WBC: CPT | Performed by: INTERNAL MEDICINE

## 2018-02-20 PROCEDURE — 83036 HEMOGLOBIN GLYCOSYLATED A1C: CPT | Performed by: INTERNAL MEDICINE

## 2018-02-20 PROCEDURE — 80053 COMPREHEN METABOLIC PANEL: CPT | Performed by: INTERNAL MEDICINE

## 2018-02-20 PROCEDURE — 80061 LIPID PANEL: CPT | Performed by: INTERNAL MEDICINE

## 2018-02-20 PROCEDURE — 85007 BL SMEAR W/DIFF WBC COUNT: CPT | Performed by: INTERNAL MEDICINE

## 2018-02-24 PROBLEM — K63.5 HYPERPLASTIC COLON POLYP: Status: ACTIVE | Noted: 2018-02-24

## 2018-02-26 ENCOUNTER — OFFICE VISIT (OUTPATIENT)
Dept: INTERNAL MEDICINE | Facility: CLINIC | Age: 68
End: 2018-02-26

## 2018-02-26 VITALS
DIASTOLIC BLOOD PRESSURE: 72 MMHG | WEIGHT: 149 LBS | HEIGHT: 64 IN | BODY MASS INDEX: 25.44 KG/M2 | HEART RATE: 76 BPM | SYSTOLIC BLOOD PRESSURE: 130 MMHG | RESPIRATION RATE: 16 BRPM

## 2018-02-26 DIAGNOSIS — M85.89 OSTEOPENIA OF MULTIPLE SITES: ICD-10-CM

## 2018-02-26 DIAGNOSIS — Z00.00 MEDICARE ANNUAL WELLNESS VISIT, SUBSEQUENT: Primary | ICD-10-CM

## 2018-02-26 DIAGNOSIS — E78.00 PURE HYPERCHOLESTEROLEMIA: ICD-10-CM

## 2018-02-26 DIAGNOSIS — R73.01 IMPAIRED FASTING GLUCOSE: ICD-10-CM

## 2018-02-26 DIAGNOSIS — K21.9 GASTROESOPHAGEAL REFLUX DISEASE, ESOPHAGITIS PRESENCE NOT SPECIFIED: ICD-10-CM

## 2018-02-26 LAB
HBA1C MFR BLD: 5.9 %
Lab: NORMAL
Lab: NORMAL

## 2018-02-26 PROCEDURE — G0444 DEPRESSION SCREEN ANNUAL: HCPCS | Performed by: INTERNAL MEDICINE

## 2018-02-26 PROCEDURE — 99214 OFFICE O/P EST MOD 30 MIN: CPT | Performed by: INTERNAL MEDICINE

## 2018-02-26 PROCEDURE — G0439 PPPS, SUBSEQ VISIT: HCPCS | Performed by: INTERNAL MEDICINE

## 2018-02-26 PROCEDURE — 83036 HEMOGLOBIN GLYCOSYLATED A1C: CPT | Performed by: INTERNAL MEDICINE

## 2018-02-26 PROCEDURE — 93000 ELECTROCARDIOGRAM COMPLETE: CPT | Performed by: INTERNAL MEDICINE

## 2018-02-26 RX ORDER — RANITIDINE 150 MG/1
150 TABLET ORAL 2 TIMES DAILY
COMMUNITY
End: 2020-01-31

## 2018-02-26 NOTE — ASSESSMENT & PLAN NOTE
Health maintenance - flu vacc 10/17; Prevnar 2/16, PVX 2/14, Tdap 2012, Zostavax 1/16; mammo [facility] 2/12/18; no further Paps; DEXA 3/17, repeat 2020, colonosc 4/16, repeat 2026 per Dr. Knight, neg AAA 2/14; eye exam 2/14/18 with Dr. Nieto at [facility] (monitored for cataracts); dental exam every 6 mos, next 3/18; (+) seat belt use    Consultants:  Patient Care Team:  Mirna Stack MD as PCP - General  Aspen Dodson MD as Consulting Physician (Rheumatology)  Nika Gibbs MD as Consulting Physician (Gastroenterology)  Palmer Resendiz MD as Consulting Physician (Medical Oncology)  Delmis Parker MD as Consulting Physician (Dermatology)  Jono Dawson MD (Surgical Oncology)  Holden Knight MD as Consulting Physician (Gastroenterology)  LINDA TrevizoM (Podiatry)  Brandon John MD as Consulting Physician (Allergy)  Jake Cornelius MD as Consulting Physician (Otolaryngology)

## 2018-02-26 NOTE — ASSESSMENT & PLAN NOTE
BG control stable with a1C 5.9; encouraged reg phys activity to decr insulin resistance, moderation in unhealthy starches/sweets; f/u A1C in 6 mos

## 2018-02-26 NOTE — PROGRESS NOTES
ANNUAL WELLNESS VISIT    DRUG AND ALCOHOL USE      no alcohol use, no tobacco use and caffeine intake: 1 cups of caffeinated coffee per day    DIET AND PHYSICAL ACTIVITY     Diet: general    Exercise: frequently   Exercise Details: walking    MOOD DISORDER AND COGNITIVE SCREENING   Depression Screening Tool Used yes - see PHQ-9   Anxiety Screening Tool Used yes     Mini-Cog Performed   Yes    1. Tell Patient 3 Words Apple, Tricia, Watch    2. Administer Clock Test normal    3. Recall 3 words  Apple, Tricia, Watch    4. Number Correct Items 3    FUNCTIONAL ABILITY AND LEVEL OF SAFETY   Hearing no hearing loss     Wears Hearing Aids No       Current Activities Independent      none  - see Funct/Cog Status Intake     Fall Risk Assessment       Has difficulty with walking or balance  No         Timed Up and Go (TUG) Test  12 sec.       If >12 sec, normal    ADVANCED DIRECTIVE has an advance directive - a copy has been provided and is in file    PAIN SCREENING Do you have pain right now? no       Recent Hospitalizations:  No recent hospitalization(s)..     MEDICATION REVIEW   - updated and reviewed (see Medication List).   - reviewed for potentially harmful drug-disease interactions in the elderly.   - reviewed for high risk medications in the elderly.   - aspirin use: No    BMI  Body mass index is 25.98 kg/(m^2).    Patient's BMI is within normal parameters. No follow-up required.    _________________________________________________________    Chief Complaint   Patient presents with   • Annual Exam     sub wellness       History of Present Illness  67 y.o.  woman presents for updated wellness visit.  Throat sxs are improved; remains on ranitidine.    Review of Systems  Denies headaches, visual changes, CP, palpitations, SOB, cough, abd pain, n/v/d, difficulty with urination, numbness/tingling, falls, mood changes, lightheadedness, hearing changes, rashes. Denies vaginal discharge or bleeding (no periods) or  "breast concerns.       All other ROS reviewed and negative.    [facility]  The following portions of the patient's history were reviewed and updated as appropriate: allergies, current medications, past family history, past medical history, past social history, past surgical history and problem list.    Current Outpatient Prescriptions:   •  Cholecalciferol (VITAMIN D) 1000 UNITS tablet, QD  •  docusate sodium (STOOL SOFTENER) 100 MG capsule, QD prn  •  hyoscyamine sulfate (ANASPAZ) 0.125 MG tablet SL prn  •  Multiple Vitamin (MULTI VITAMIN DAILY PO), QD  •  nabumetone (RELAFEN) 750 MG tablet, BID  •  psyllium (METAMUCIL) 0.52 G capsule, QD prn  •  tolterodine LA (DETROL LA) 4 MG 24 hr capsule, QD  •  ranitidine 150mg BID    VITALS:  /72 (BP Location: Left arm, Patient Position: Sitting)  Pulse 76  Resp 16  Ht 161.3 cm (63.5\")  Wt 67.6 kg (149 lb)  BMI 25.98 kg/m2    Physical Exam   Constitutional: She is oriented to person, place, and time. She appears well-developed and well-nourished.   HENT:   Head: Normocephalic.   Right Ear: External ear normal.   Left Ear: External ear normal.   Nose: Nose normal.   Mouth/Throat: Oropharynx is clear and moist and mucous membranes are normal. No oropharyngeal exudate.   Eyes: Conjunctivae and EOM are normal. Pupils are equal, round, and reactive to light.   Neck: Normal range of motion. Neck supple. Carotid bruit is not present (bilaterally). No thyromegaly present.   Cardiovascular: Normal rate, regular rhythm and normal heart sounds.    Pulmonary/Chest: Effort normal and breath sounds normal. No respiratory distress.   Abdominal: Soft. Bowel sounds are normal. She exhibits no distension and no mass. There is no hepatosplenomegaly. There is no tenderness.   Musculoskeletal: Normal range of motion. She exhibits no edema.   Lymphadenopathy:     She has no cervical adenopathy.   Neurological: She is alert and oriented to person, place, and time. She has normal reflexes. She " displays normal reflexes. No cranial nerve deficit.   Skin: Skin is warm and dry. No rash noted.   Psychiatric: She has a normal mood and affect. Her behavior is normal.   Nursing note and vitals reviewed.        LABS  Results for orders placed or performed in visit on 02/26/18   POC Glycosylated Hemoglobin (Hb A1C)   Result Value Ref Range    Hemoglobin A1C 5.9 %    Lot Number [id number]     BIPAP Exp. 10-19      Stable CBC, CMP, lipids , TG 49, HDL 91,     ECG 12 Lead  Date/Time: 2/26/2018 8:54 AM  Performed by: AIDEN STACK  Authorized by: AIDEN STACK   Comparison: compared with previous ECG from 2/23/2017  Similar to previous ECG  Rhythm: sinus rhythm  Rate: normal  BPM: 78  Conduction: conduction normal  ST Segments: ST segments normal  QRS axis: normal  Other findings comments: inverted T waves III  Clinical impression comment: stable EKG              ASSESSMENT/PLAN  Problem List Items Addressed This Visit     Hyperlipidemia     Lipids stable with goal LDL < 130; currently no meds         Relevant Orders    ECG 12 Lead    Impaired fasting glucose     BG control stable with a1C 5.9; encouraged reg phys activity to decr insulin resistance, moderation in unhealthy starches/sweets; f/u A1C in 6 mos           Relevant Orders    POC Glycosylated Hemoglobin (Hb A1C) (Completed)    Osteopenia     Calcium and vitamin D supplementation with weight-bearing exercise; DEXA 3/17, repeat 2020            Medicare annual wellness visit, subsequent - Primary     Health maintenance - flu vacc 10/17; Prevnar 2/16, PVX 2/14, Tdap 2012, Zostavax 1/16; mammo [facility] 2/12/18; no further Paps; DEXA 3/17, repeat 2020, colonosc 4/16, repeat 2026 per Dr. Knight, neg AAA 2/14; eye exam 2/14/18 with Dr. Nieto at [facility] (monitored for cataracts); dental exam every 6 mos, next 3/18; (+) seat belt use    Consultants:  Patient Care Team:  Aiden Stack MD as PCP - General  Aspen Dodson MD as Consulting Physician  (Rheumatology)  Nika Gibbs MD as Consulting Physician (Gastroenterology)  Palmer Resendiz MD as Consulting Physician (Medical Oncology)  Delmis Parker MD as Consulting Physician (Dermatology)  Jono Dawson MD (Surgical Oncology)  Holden Knight MD as Consulting Physician (Gastroenterology)  Daniel Rosen DPM (Podiatry)  Brandon John MD as Consulting Physician (Allergy)  Jake Cornelius MD as Consulting Physician (Otolaryngology)             GERD (gastroesophageal reflux disease)     Stable on ranitidine 150mg BID - try off of it in a few months; cont to minimize triggers and to stay upright for 2-3 hours after eating         Relevant Medications    raNITIdine (ZANTAC) 150 MG tablet          FOLLOW-UP  RTC 6 mos with A1C    Electronically signed by:    Mirna Stack MD  02/26/2018

## 2018-08-27 ENCOUNTER — OFFICE VISIT (OUTPATIENT)
Dept: INTERNAL MEDICINE | Facility: CLINIC | Age: 68
End: 2018-08-27

## 2018-08-27 VITALS
BODY MASS INDEX: 25.27 KG/M2 | OXYGEN SATURATION: 98 % | RESPIRATION RATE: 12 BRPM | HEART RATE: 66 BPM | SYSTOLIC BLOOD PRESSURE: 122 MMHG | WEIGHT: 148 LBS | DIASTOLIC BLOOD PRESSURE: 68 MMHG | HEIGHT: 64 IN

## 2018-08-27 DIAGNOSIS — Z71.85 VACCINE COUNSELING: ICD-10-CM

## 2018-08-27 DIAGNOSIS — R73.01 IMPAIRED FASTING GLUCOSE: Primary | ICD-10-CM

## 2018-08-27 LAB
GLUCOSE BLDC GLUCOMTR-MCNC: 102 MG/DL (ref 70–130)
HBA1C MFR BLD: 5.9 %

## 2018-08-27 PROCEDURE — 83036 HEMOGLOBIN GLYCOSYLATED A1C: CPT | Performed by: INTERNAL MEDICINE

## 2018-08-27 PROCEDURE — 99213 OFFICE O/P EST LOW 20 MIN: CPT | Performed by: INTERNAL MEDICINE

## 2018-08-27 PROCEDURE — 82947 ASSAY GLUCOSE BLOOD QUANT: CPT | Performed by: INTERNAL MEDICINE

## 2018-08-27 NOTE — PROGRESS NOTES
"Chief Complaint   Patient presents with   • 6 mo fu a1c     Follow up        History of Present Illness  67 y.o.  woman presents for sugar follow-up.  Feels well overall without complaints.  States 2 sisters with alpha-1-antitrypsin carrier status; now has found out grandson is carrier as well.    Review of Systems  Denies headaches visual changes, CP, palpitations, SOB, falls. All other ROS reviewed and negative.    [facility]  The following portions of the patient's history were reviewed and updated as appropriate: allergies, current medications, past family history, past medical history, past social history, past surgical history and problem list.    Current Outpatient Prescriptions:   •  Cholecalciferol (VITAMIN D) 1000 UNITS QD  •  docusate sodium (STOOL SOFTENER) 100 MG QD  •  hyoscyamine sulfate (ANASPAZ) 0.125 MG tablet prn  •  Multiple Vitamin (MULTI VITAMIN DAILY PO), QD  •  nabumetone (RELAFEN) 750 MG tablet, BID prn  •  psyllium (METAMUCIL) 0.52 G capsule, QD  •  raNITIdine (ZANTAC) 150 MG tablet, BID  •  tolterodine LA (DETROL LA) 4 MG QD    VITALS:  /68   Pulse 66   Resp 12   Ht 161.3 cm (63.5\")   Wt 67.1 kg (148 lb)   SpO2 98%   BMI 25.81 kg/m²     Physical Exam   Constitutional: She is oriented to person, place, and time. She appears well-developed and well-nourished.   Eyes: Conjunctivae and EOM are normal.   Cardiovascular: Normal rate, regular rhythm and normal heart sounds.    Pulmonary/Chest: Effort normal and breath sounds normal.   Abdominal: Soft. Bowel sounds are normal.   Neurological: She is alert and oriented to person, place, and time.   Psychiatric: She has a normal mood and affect. Her behavior is normal.   Nursing note and vitals reviewed.      LABS  Results for orders placed or performed in visit on 08/27/18   POC Glycosylated Hemoglobin (Hb A1C)   Result Value Ref Range    Hemoglobin A1C 5.9 %   POCT Glucose   Result Value Ref Range    Glucose 102 70 - 130 mg/dL "     2/18 A1C 5.9 and 6.1    ASSESSMENT/PLAN  Problem List Items Addressed This Visit     Impaired fasting glucose - Primary     BG control stable with a1C 5.9; encouraged reg phys activity to decr insulin resistance, moderation in unhealthy starches/sweets; f/u A1C in 6 mos           Relevant Orders    POC Glycosylated Hemoglobin (Hb A1C) (Completed)    POCT Glucose (Completed)      Other Visit Diagnoses     Vaccine counseling        counseling re: shingrix, incl s/e, risks, and rec to get at pharmacy; flu vacc in upcoming mos          FOLLOW-UP  1. Health maintenance - counseling re: shingrix; rec flu vacc in upcoming fall mos; mammo 2/18 ()  2. RTC for next wellness after 2/26/19; fasting labs the week prior to appt (CBC, CMP, TSH, lipids, UA/micro, A1C, FT4)    Electronically signed by:    Mirna Stack MD  08/27/2018

## 2018-12-28 ENCOUNTER — OFFICE VISIT (OUTPATIENT)
Dept: INTERNAL MEDICINE | Facility: CLINIC | Age: 68
End: 2018-12-28

## 2018-12-28 VITALS
WEIGHT: 144 LBS | SYSTOLIC BLOOD PRESSURE: 132 MMHG | OXYGEN SATURATION: 99 % | BODY MASS INDEX: 24.59 KG/M2 | HEIGHT: 64 IN | HEART RATE: 72 BPM | TEMPERATURE: 98 F | DIASTOLIC BLOOD PRESSURE: 82 MMHG

## 2018-12-28 DIAGNOSIS — R30.0 DYSURIA: Primary | ICD-10-CM

## 2018-12-28 LAB
ANION GAP SERPL CALCULATED.3IONS-SCNC: 7 MMOL/L (ref 3–11)
BASOPHILS # BLD AUTO: 0.04 10*3/MM3 (ref 0–0.2)
BASOPHILS NFR BLD AUTO: 0.6 % (ref 0–1)
BILIRUB BLD-MCNC: NEGATIVE MG/DL
BUN BLD-MCNC: 15 MG/DL (ref 9–23)
BUN/CREAT SERPL: 22.7 (ref 7–25)
CALCIUM SPEC-SCNC: 9.4 MG/DL (ref 8.7–10.4)
CHLORIDE SERPL-SCNC: 99 MMOL/L (ref 99–109)
CLARITY, POC: CLEAR
CO2 SERPL-SCNC: 29 MMOL/L (ref 20–31)
COLOR UR: YELLOW
CREAT BLD-MCNC: 0.66 MG/DL (ref 0.6–1.3)
DEPRECATED RDW RBC AUTO: 46 FL (ref 37–54)
EOSINOPHIL # BLD AUTO: 0.4 10*3/MM3 (ref 0–0.3)
EOSINOPHIL NFR BLD AUTO: 5.5 % (ref 0–3)
ERYTHROCYTE [DISTWIDTH] IN BLOOD BY AUTOMATED COUNT: 13.2 % (ref 11.3–14.5)
GFR SERPL CREATININE-BSD FRML MDRD: 89 ML/MIN/1.73
GLUCOSE BLD-MCNC: 95 MG/DL (ref 70–100)
GLUCOSE UR STRIP-MCNC: NEGATIVE MG/DL
HCT VFR BLD AUTO: 38.7 % (ref 34.5–44)
HGB BLD-MCNC: 12.7 G/DL (ref 11.5–15.5)
IMM GRANULOCYTES # BLD AUTO: 0.02 10*3/MM3 (ref 0–0.03)
IMM GRANULOCYTES NFR BLD AUTO: 0.3 % (ref 0–0.6)
KETONES UR QL: NEGATIVE
LEUKOCYTE EST, POC: NEGATIVE
LYMPHOCYTES # BLD AUTO: 2.14 10*3/MM3 (ref 0.6–4.8)
LYMPHOCYTES NFR BLD AUTO: 29.6 % (ref 24–44)
MCH RBC QN AUTO: 31.1 PG (ref 27–31)
MCHC RBC AUTO-ENTMCNC: 32.8 G/DL (ref 32–36)
MCV RBC AUTO: 94.9 FL (ref 80–99)
MONOCYTES # BLD AUTO: 0.65 10*3/MM3 (ref 0–1)
MONOCYTES NFR BLD AUTO: 9 % (ref 0–12)
NEUTROPHILS # BLD AUTO: 3.99 10*3/MM3 (ref 1.5–8.3)
NEUTROPHILS NFR BLD AUTO: 55 % (ref 41–71)
NITRITE UR-MCNC: NEGATIVE MG/ML
PH UR: 7 [PH] (ref 5–8)
PLATELET # BLD AUTO: 355 10*3/MM3 (ref 150–450)
PMV BLD AUTO: 10 FL (ref 6–12)
POTASSIUM BLD-SCNC: 4.4 MMOL/L (ref 3.5–5.5)
PROT UR STRIP-MCNC: NEGATIVE MG/DL
RBC # BLD AUTO: 4.08 10*6/MM3 (ref 3.89–5.14)
RBC # UR STRIP: NEGATIVE /UL
SODIUM BLD-SCNC: 135 MMOL/L (ref 132–146)
SP GR UR: 1 (ref 1–1.03)
UROBILINOGEN UR QL: NORMAL
WBC NRBC COR # BLD: 7.24 10*3/MM3 (ref 3.5–10.8)

## 2018-12-28 PROCEDURE — 99213 OFFICE O/P EST LOW 20 MIN: CPT | Performed by: NURSE PRACTITIONER

## 2018-12-28 PROCEDURE — 85025 COMPLETE CBC W/AUTO DIFF WBC: CPT | Performed by: NURSE PRACTITIONER

## 2018-12-28 PROCEDURE — 80048 BASIC METABOLIC PNL TOTAL CA: CPT | Performed by: NURSE PRACTITIONER

## 2018-12-28 PROCEDURE — 81003 URINALYSIS AUTO W/O SCOPE: CPT | Performed by: NURSE PRACTITIONER

## 2018-12-28 NOTE — PROGRESS NOTES
Chief Complaint   Patient presents with   • Urinary Tract Infection     x3 weeks       History of Present Illness  68 y.o.female presents for uti sx.    C/o foul smelling cloudy urine and lower urinary discomfort; onset about 3 weeks.  Symptoms have been intermittent; no flank pain or fever.   Denies any new meds, herbs or OTC meds; no changes to diet.  Upon further questioning states urine is foamy at times.  No rash; no recent URI or pharyngitis.    Review of Systems   Constitutional: Negative for chills and fever.   Gastrointestinal: Negative for abdominal pain and nausea.   Genitourinary: Positive for dysuria. Negative for difficulty urinating, frequency, hematuria, pelvic pain, vaginal discharge and vaginal pain.        Foal smelling cloudy urine         [facility]  The following portions of the patient's history were reviewed and updated as appropriate: allergies, current medications, past family history, past medical history, past social history, past surgical history and problem list.     Past Medical History:   Diagnosis Date   • Diarrhea 4/29/2016    Impression: 05/22/2014 - reassuring that sxs come/go; discontinue stool softener until resolution of sxs; monitor for food triggers; add fiber supplement 1x/day;  consider addition of probiotic but rec 30d trial if she proceeds; f/u if sxs do not resolve;    • Hx of AAA ultrasound 02/28/2014    neg AAA ultrasound (2/28/14)   • Hx of bone density study 03/07/2014    DEXA (3/7/14): L -1.4, H -1.6   • Hx of bone density study 03/16/2017    DEXA (3/16/17) L -1.7, H -1.3, repeat 3 yrs   • Hx of colonoscopy 04/18/2016    colonosc (4/18/16): hyperplastic polyp, diverticulosis, repeat 10 yrs; GI - Dr. Knight   • Hx of echocardiogram 03/16/2014    ECHO (2/17): mild concentric LVH and diastolic dysfunction, mild MR/TR/AR   • Hx of thyroid ultrasound 01/30/2018    nl thyr u/s (1/30/18)      Past Surgical History:   Procedure Laterality Date   • BILATERAL OOPHORECTOMY   "02/2003    due to (+)FH ovarian CA; GYN - Dr. Resendiz   • INCISIONAL BREAST BIOPSY      x2 (5/71 and 12/81)surg - Dr. Dawson      Allergies   Allergen Reactions   • Erythromycin    • Sulfa Antibiotics       Family History   Problem Relation Age of Onset   • Goiter Sister    • Heart attack Sister         tob   • Stroke Sister    • Alpha-1 antitrypsin deficiency Sister         carrier   • Thyroid nodules Sister    • Rheum arthritis Sister    • Alpha-1 antitrypsin deficiency Sister         carrier   • Ovarian cancer Mother    • Ovarian cancer Sister    • Alpha-1 antitrypsin deficiency Grandchild         carrier      Social History     Socioeconomic History   • Marital status:    Tobacco Use   • Smoking status: Never Smoker   • Smokeless tobacco: Never Used   Substance and Sexual Activity   • Alcohol use: Not on file   • Drug use: Not on file   • Sexual activity: Not on file   Other Topics Concern   • Not on file   Social History Narrative    5 sisters and 4 brothers; patient is #7         Current Outpatient Medications:   •  Cholecalciferol (VITAMIN D) 1000 UNITS tablet, Take 1,000 Units by mouth Daily., Disp: , Rfl:   •  docusate sodium (STOOL SOFTENER) 100 MG capsule, Take 1 capsule by mouth daily as needed., Disp: , Rfl:   •  hyoscyamine sulfate (ANASPAZ) 0.125 MG tablet dispersible disintegrating tablet, Take  by mouth as needed., Disp: , Rfl:   •  Multiple Vitamin (MULTI VITAMIN DAILY PO), Take  by mouth daily., Disp: , Rfl:   •  nabumetone (RELAFEN) 750 MG tablet, Take  by mouth 2 (two) times a day., Disp: , Rfl:   •  psyllium (METAMUCIL) 0.52 G capsule, Take 1 capsule by mouth daily as needed., Disp: , Rfl:   •  raNITIdine (ZANTAC) 150 MG tablet, Take 150 mg by mouth 2 (Two) Times a Day., Disp: , Rfl:   •  tolterodine LA (DETROL LA) 4 MG 24 hr capsule, Take 1 capsule by mouth Daily., Disp: 90 capsule, Rfl: 3    VITALS:  /82   Pulse 72   Temp 98 °F (36.7 °C)   Ht 161.3 cm (63.5\")   Wt 65.3 kg " (144 lb)   SpO2 99%   BMI 25.11 kg/m²     Physical Exam   Constitutional: She is oriented to person, place, and time. She appears well-developed and well-nourished. No distress.   Cardiovascular: Normal rate.   Pulmonary/Chest: Effort normal.   Abdominal: Soft. There is no tenderness. There is no CVA tenderness.   Neurological: She is alert and oriented to person, place, and time.   Skin: Skin is warm and dry.       LABS  Results for orders placed or performed in visit on 12/28/18   CBC Auto Differential   Result Value Ref Range    WBC 7.24 3.50 - 10.80 10*3/mm3    RBC 4.08 3.89 - 5.14 10*6/mm3    Hemoglobin 12.7 11.5 - 15.5 g/dL    Hematocrit 38.7 34.5 - 44.0 %    MCV 94.9 80.0 - 99.0 fL    MCH 31.1 (H) 27.0 - 31.0 pg    MCHC 32.8 32.0 - 36.0 g/dL    RDW 13.2 11.3 - 14.5 %    RDW-SD 46.0 37.0 - 54.0 fl    MPV 10.0 6.0 - 12.0 fL    Platelets 355 150 - 450 10*3/mm3    Neutrophil % 55.0 41.0 - 71.0 %    Lymphocyte % 29.6 24.0 - 44.0 %    Monocyte % 9.0 0.0 - 12.0 %    Eosinophil % 5.5 (H) 0.0 - 3.0 %    Basophil % 0.6 0.0 - 1.0 %    Immature Grans % 0.3 0.0 - 0.6 %    Neutrophils, Absolute 3.99 1.50 - 8.30 10*3/mm3    Lymphocytes, Absolute 2.14 0.60 - 4.80 10*3/mm3    Monocytes, Absolute 0.65 0.00 - 1.00 10*3/mm3    Eosinophils, Absolute 0.40 (H) 0.00 - 0.30 10*3/mm3    Basophils, Absolute 0.04 0.00 - 0.20 10*3/mm3    Immature Grans, Absolute 0.02 0.00 - 0.03 10*3/mm3   Basic metabolic panel   Result Value Ref Range    Glucose 95 70 - 100 mg/dL    BUN 15 9 - 23 mg/dL    Creatinine 0.66 0.60 - 1.30 mg/dL    Sodium 135 132 - 146 mmol/L    Potassium 4.4 3.5 - 5.5 mmol/L    Chloride 99 99 - 109 mmol/L    CO2 29.0 20.0 - 31.0 mmol/L    Calcium 9.4 8.7 - 10.4 mg/dL    eGFR Non African Amer 89 >60 mL/min/1.73    BUN/Creatinine Ratio 22.7 7.0 - 25.0    Anion Gap 7.0 3.0 - 11.0 mmol/L   POCT urinalysis dipstick, automated   Result Value Ref Range    Color Yellow Yellow, Straw, Dark Yellow, [place]    Clarity, UA Clear Clear     Specific Gravity  1.000 (A) 1.005 - 1.030    pH, Urine 7.0 5.0 - 8.0    Leukocytes Negative Negative    Nitrite, UA Negative Negative    Protein, POC Negative Negative mg/dL    Glucose, UA Negative Negative, 1000 mg/dL (3+) mg/dL    Ketones, UA Negative Negative    Urobilinogen, UA Normal Normal    Bilirubin Negative Negative    Blood, UA Negative Negative       ASSESSMENT/PLAN  Natalia was seen today for urinary tract infection.    Diagnoses and all orders for this visit:    Dysuria  -     POCT urinalysis dipstick, automated  -     CBC Auto Differential  -     Basic metabolic panel  -     Sedimentation rate, automated    UA was neg for leuks and no hematuria; pt continues to voice concern with foul smelling urine. Denies any new meds, herbs or OTC meds; no changes to diet.  Upon further questioning states urine is foamy at times.  Checked bmp w/normal creat; neg wbc on cbc; sed rate pending.  Encouraged use of AZO over the counter and drink plenty of water.      I discussed the patients findings and my recommendations with patient.     Patient was encouraged to keep me informed of any acute changes, lack of improvement, or any new concerning symptoms.    Patient voiced understanding of all instructions and denied further questions.    Addendum: labs ok; will call pt and update of labs; if still with cloudy foul smelling urine dysuria after Azo and drinking water; would like her to stop by for repeat UA.  Order placed if needed.    FOLLOW-UP  Return if symptoms worsen or fail to improve.    Electronically signed by:    JOSE Castillo  12/28/2018

## 2019-01-13 DIAGNOSIS — N39.41 URGE INCONTINENCE OF URINE: ICD-10-CM

## 2019-01-14 RX ORDER — TOLTERODINE 4 MG/1
CAPSULE, EXTENDED RELEASE ORAL
Qty: 90 CAPSULE | Refills: 3 | Status: SHIPPED | OUTPATIENT
Start: 2019-01-14 | End: 2019-03-01 | Stop reason: SDUPTHER

## 2019-02-11 ENCOUNTER — TELEPHONE (OUTPATIENT)
Dept: INTERNAL MEDICINE | Facility: CLINIC | Age: 69
End: 2019-02-11

## 2019-02-11 DIAGNOSIS — N39.41 URGE INCONTINENCE OF URINE: ICD-10-CM

## 2019-02-11 DIAGNOSIS — Z00.00 MEDICARE ANNUAL WELLNESS VISIT, SUBSEQUENT: Primary | ICD-10-CM

## 2019-02-11 DIAGNOSIS — R73.01 IMPAIRED FASTING GLUCOSE: ICD-10-CM

## 2019-02-11 DIAGNOSIS — R94.6 ABNORMAL FINDING ON THYROID FUNCTION TEST: ICD-10-CM

## 2019-02-11 DIAGNOSIS — E78.00 PURE HYPERCHOLESTEROLEMIA: ICD-10-CM

## 2019-02-25 ENCOUNTER — LAB (OUTPATIENT)
Dept: INTERNAL MEDICINE | Facility: CLINIC | Age: 69
End: 2019-02-25

## 2019-02-25 DIAGNOSIS — R73.01 IMPAIRED FASTING GLUCOSE: ICD-10-CM

## 2019-02-25 DIAGNOSIS — E78.00 PURE HYPERCHOLESTEROLEMIA: ICD-10-CM

## 2019-02-25 DIAGNOSIS — Z00.00 MEDICARE ANNUAL WELLNESS VISIT, SUBSEQUENT: ICD-10-CM

## 2019-02-25 DIAGNOSIS — N39.41 URGE INCONTINENCE OF URINE: ICD-10-CM

## 2019-02-25 DIAGNOSIS — R94.6 ABNORMAL FINDING ON THYROID FUNCTION TEST: ICD-10-CM

## 2019-02-25 LAB
ALBUMIN SERPL-MCNC: 4.6 G/DL (ref 3.2–4.8)
ALBUMIN/GLOB SERPL: 2.6 G/DL (ref 1.5–2.5)
ALP SERPL-CCNC: 46 U/L (ref 25–100)
ALT SERPL W P-5'-P-CCNC: 28 U/L (ref 7–40)
ANION GAP SERPL CALCULATED.3IONS-SCNC: 7 MMOL/L (ref 3–11)
ARTICHOKE IGE QN: 129 MG/DL (ref 0–130)
AST SERPL-CCNC: 29 U/L (ref 0–33)
BACTERIA UR QL AUTO: NORMAL /HPF
BASOPHILS # BLD AUTO: 0.05 10*3/MM3 (ref 0–0.2)
BASOPHILS NFR BLD AUTO: 0.9 % (ref 0–1)
BILIRUB SERPL-MCNC: 0.4 MG/DL (ref 0.3–1.2)
BILIRUB UR QL STRIP: NEGATIVE
BUN BLD-MCNC: 16 MG/DL (ref 9–23)
BUN/CREAT SERPL: 20.8 (ref 7–25)
CALCIUM SPEC-SCNC: 9.8 MG/DL (ref 8.7–10.4)
CHLORIDE SERPL-SCNC: 99 MMOL/L (ref 99–109)
CHOLEST SERPL-MCNC: 247 MG/DL (ref 0–200)
CLARITY UR: CLEAR
CO2 SERPL-SCNC: 28 MMOL/L (ref 20–31)
COLOR UR: YELLOW
CREAT BLD-MCNC: 0.77 MG/DL (ref 0.6–1.3)
DEPRECATED RDW RBC AUTO: 47.4 FL (ref 37–54)
EOSINOPHIL # BLD AUTO: 0.33 10*3/MM3 (ref 0–0.3)
EOSINOPHIL NFR BLD AUTO: 6.1 % (ref 0–3)
ERYTHROCYTE [DISTWIDTH] IN BLOOD BY AUTOMATED COUNT: 13.5 % (ref 11.3–14.5)
GFR SERPL CREATININE-BSD FRML MDRD: 74 ML/MIN/1.73
GLOBULIN UR ELPH-MCNC: 1.8 GM/DL
GLUCOSE BLD-MCNC: 105 MG/DL (ref 70–100)
GLUCOSE UR STRIP-MCNC: NEGATIVE MG/DL
HBA1C MFR BLD: 5.9 % (ref 4.8–5.6)
HCT VFR BLD AUTO: 38.3 % (ref 34.5–44)
HDLC SERPL-MCNC: 89 MG/DL (ref 40–60)
HGB BLD-MCNC: 12.5 G/DL (ref 11.5–15.5)
HGB UR QL STRIP.AUTO: NEGATIVE
HYALINE CASTS UR QL AUTO: NORMAL /LPF
IMM GRANULOCYTES # BLD AUTO: 0.01 10*3/MM3 (ref 0–0.05)
IMM GRANULOCYTES NFR BLD AUTO: 0.2 % (ref 0–0.6)
KETONES UR QL STRIP: NEGATIVE
LEUKOCYTE ESTERASE UR QL STRIP.AUTO: NEGATIVE
LYMPHOCYTES # BLD AUTO: 1.72 10*3/MM3 (ref 0.6–4.8)
LYMPHOCYTES NFR BLD AUTO: 31.8 % (ref 24–44)
MCH RBC QN AUTO: 31.5 PG (ref 27–31)
MCHC RBC AUTO-ENTMCNC: 32.6 G/DL (ref 32–36)
MCV RBC AUTO: 96.5 FL (ref 80–99)
MONOCYTES # BLD AUTO: 0.46 10*3/MM3 (ref 0–1)
MONOCYTES NFR BLD AUTO: 8.5 % (ref 0–12)
NEUTROPHILS # BLD AUTO: 2.85 10*3/MM3 (ref 1.5–8.3)
NEUTROPHILS NFR BLD AUTO: 52.7 % (ref 41–71)
NITRITE UR QL STRIP: NEGATIVE
PH UR STRIP.AUTO: 7.5 [PH] (ref 5–8)
PLATELET # BLD AUTO: 337 10*3/MM3 (ref 150–450)
PMV BLD AUTO: 10.5 FL (ref 6–12)
POTASSIUM BLD-SCNC: 4.3 MMOL/L (ref 3.5–5.5)
PROT SERPL-MCNC: 6.4 G/DL (ref 5.7–8.2)
PROT UR QL STRIP: NEGATIVE
RBC # BLD AUTO: 3.97 10*6/MM3 (ref 3.89–5.14)
RBC # UR: NORMAL /HPF
REF LAB TEST METHOD: NORMAL
SODIUM BLD-SCNC: 134 MMOL/L (ref 132–146)
SP GR UR STRIP: <=1.005 (ref 1–1.03)
SQUAMOUS #/AREA URNS HPF: NORMAL /HPF
T4 FREE SERPL-MCNC: 1.59 NG/DL (ref 0.89–1.76)
TRIGL SERPL-MCNC: 68 MG/DL (ref 0–150)
TSH SERPL DL<=0.05 MIU/L-ACNC: 3.51 MIU/ML (ref 0.35–5.35)
UROBILINOGEN UR QL STRIP: NORMAL
WBC NRBC COR # BLD: 5.41 10*3/MM3 (ref 3.5–10.8)
WBC UR QL AUTO: NORMAL /HPF

## 2019-02-25 PROCEDURE — 85025 COMPLETE CBC W/AUTO DIFF WBC: CPT | Performed by: INTERNAL MEDICINE

## 2019-02-25 PROCEDURE — 80053 COMPREHEN METABOLIC PANEL: CPT | Performed by: INTERNAL MEDICINE

## 2019-02-25 PROCEDURE — 80061 LIPID PANEL: CPT | Performed by: INTERNAL MEDICINE

## 2019-02-25 PROCEDURE — 81001 URINALYSIS AUTO W/SCOPE: CPT | Performed by: INTERNAL MEDICINE

## 2019-02-25 PROCEDURE — 84439 ASSAY OF FREE THYROXINE: CPT | Performed by: INTERNAL MEDICINE

## 2019-02-25 PROCEDURE — 84443 ASSAY THYROID STIM HORMONE: CPT | Performed by: INTERNAL MEDICINE

## 2019-02-25 PROCEDURE — 83036 HEMOGLOBIN GLYCOSYLATED A1C: CPT | Performed by: INTERNAL MEDICINE

## 2019-03-01 ENCOUNTER — OFFICE VISIT (OUTPATIENT)
Dept: INTERNAL MEDICINE | Facility: CLINIC | Age: 69
End: 2019-03-01

## 2019-03-01 VITALS
HEART RATE: 74 BPM | DIASTOLIC BLOOD PRESSURE: 70 MMHG | HEIGHT: 64 IN | WEIGHT: 150 LBS | OXYGEN SATURATION: 96 % | SYSTOLIC BLOOD PRESSURE: 128 MMHG | BODY MASS INDEX: 25.61 KG/M2

## 2019-03-01 DIAGNOSIS — R73.01 IMPAIRED FASTING GLUCOSE: ICD-10-CM

## 2019-03-01 DIAGNOSIS — N39.41 URGE INCONTINENCE OF URINE: ICD-10-CM

## 2019-03-01 DIAGNOSIS — M85.89 OSTEOPENIA OF MULTIPLE SITES: ICD-10-CM

## 2019-03-01 DIAGNOSIS — Z00.00 MEDICARE ANNUAL WELLNESS VISIT, SUBSEQUENT: Primary | ICD-10-CM

## 2019-03-01 DIAGNOSIS — E78.00 PURE HYPERCHOLESTEROLEMIA: ICD-10-CM

## 2019-03-01 DIAGNOSIS — M17.0 PRIMARY OSTEOARTHRITIS OF BOTH KNEES: ICD-10-CM

## 2019-03-01 DIAGNOSIS — R94.6 ABNORMAL FINDING ON THYROID FUNCTION TEST: ICD-10-CM

## 2019-03-01 PROCEDURE — 99214 OFFICE O/P EST MOD 30 MIN: CPT | Performed by: INTERNAL MEDICINE

## 2019-03-01 PROCEDURE — 93000 ELECTROCARDIOGRAM COMPLETE: CPT | Performed by: INTERNAL MEDICINE

## 2019-03-01 PROCEDURE — G0439 PPPS, SUBSEQ VISIT: HCPCS | Performed by: INTERNAL MEDICINE

## 2019-03-01 RX ORDER — TOLTERODINE 4 MG/1
4 CAPSULE, EXTENDED RELEASE ORAL DAILY
Qty: 90 CAPSULE | Refills: 3 | Status: SHIPPED | OUTPATIENT
Start: 2019-03-01 | End: 2020-02-28 | Stop reason: SDUPTHER

## 2019-03-01 NOTE — ASSESSMENT & PLAN NOTE
Bord lipids with ; goal LDL <130; decrease saturated fats and cholesterol in the diet; repeat lipids in 12 mos

## 2019-03-01 NOTE — PROGRESS NOTES
ANNUAL WELLNESS VISIT    DRUG AND ALCOHOL USE      no alcohol use, no tobacco use and caffeine intake: 1 cups of caffeinated coffee per day    DIET AND PHYSICAL ACTIVITY     Diet: general    Exercise: frequently   Exercise Details: yoga, strength training;walking (goes to gym)    MOOD DISORDER AND COGNITIVE SCREENING   Depression Screening Tool Used yes - see PHQ-9; components reviewed with patient; 15-min counseling done   Anxiety Screening Tool Used yes     Mini-Cog Performed   Yes    1. Tell Patient 3 Words apple table donna    2. Administer Clock Test normal    3. Recall 3 words  apple table donna    4. Number Correct Items 3    FUNCTIONAL ABILITY AND LEVEL OF SAFETY   Hearing no hearing loss     Wears Hearing Aids No      Current Activities Independent      none  - see Funct/Cog Status Intake     Fall Risk Assessment       Has difficulty with walking or balance  No         Timed Up and Go (TUG) Test  8 sec.       If >12 sec, normal    ADVANCED DIRECTIVE has an advance directive - a copy has been provided and is in file    PAIN SCREENING Do you have pain right now? yes      If so, 1-10 scale: 3     Intermittent - currently with knee pain     Do you have pain every day? Yes      Probable chronic pain: Yes     Recent Hospitalizations:  No recent hospitalization(s)..     MEDICATION REVIEW   - updated and reviewed (see Medication List).   - reviewed for potentially harmful drug-disease interactions in the elderly.   - reviewed for high risk medications in the elderly.   - aspirin use: No    BMI  Body mass index is 26.15 kg/m².    Patient's Body mass index is 26.15 kg/m². BMI is above normal parameters. Recommendations include: exercise counseling and nutrition counseling.    _________________________________________________________    Chief Complaint   Patient presents with   • Medicare Wellness-subsequent       History of Present Illness  68 y.o.  woman presents for updated wellness visit.  Recently been  "having some bilateral knee pain for the last several weeks due to twisting it when walking up some bleachers.  Has had increase her nabumetone to 2x/day and plans to see rheumatology for follow-up.  Dr. Dodson retired and she has an appointment with Dr. Ribeiro.    Review of Systems  Denies headaches, visual changes, CP, palpitations, SOB, cough, abd pain, n/v/d, difficulty with urination (stable urin incont, on med, some s/e of dry mouth), numbness/tingling, falls, mood changes, lightheadedness, hearing changes, rashes.    Denies vaginal discharge or bleeding (no periods) or breast concerns.    ROS (+) for bilateral knee pain as above; has not had any falls.    ROS (+) for cataracts - will likely be doing surgery in the spring.     All other ROS reviewed and negative.    [facility]  The following portions of the patient's history were reviewed and updated as appropriate: allergies, current medications, past family history, past medical history, past social history, past surgical history and problem list.      Current Outpatient Medications:   •  Cholecalciferol (VITAMIN D) 1000 UNITS qD  •  docusate sodium (STOOL SOFTENER) 100 MG QD  •  hyoscyamine sulfate (ANASPAZ) 0.125 MG tablet prn  •  Multiple Vitamin (MULTI VITAMIN DAILY PO), QD  •  nabumetone (RELAFEN) 750 MG tablet,BID   •  psyllium (METAMUCIL) 0.52 G capsule, QD  •  raNITIdine (ZANTAC) 150 MG tablet, BID  •  tolterodine LA (DETROL LA) 4 MG QD    VITALS:  /70   Pulse 74   Ht 161.3 cm (63.5\")   Wt 68 kg (150 lb)   SpO2 96%   BMI 26.15 kg/m²     Physical Exam   Constitutional: She is oriented to person, place, and time. She appears well-developed and well-nourished.   HENT:   Head: Normocephalic.   Right Ear: External ear normal.   Left Ear: External ear normal.   Nose: Nose normal.   Mouth/Throat: Oropharynx is clear and moist and mucous membranes are normal. No oropharyngeal exudate.   Eyes: Conjunctivae and EOM are normal. Pupils are equal, round, " and reactive to light.   Neck: Normal range of motion. Neck supple. Carotid bruit is not present (bilaterally). No thyromegaly present.   Cardiovascular: Normal rate and regular rhythm.   Murmur (I/VI SM LSB) heard.  Pulmonary/Chest: Effort normal and breath sounds normal. No respiratory distress.   Abdominal: Soft. Bowel sounds are normal. She exhibits no distension and no mass. There is no hepatosplenomegaly. There is no tenderness.   Genitourinary:   Genitourinary Comments: Breast exam with minimal fibrocystic changes; otherwise unremarkable without masses, skin changes, nipple discharge, or axillary adenopathy.     Musculoskeletal: Normal range of motion. She exhibits no edema.   Lymphadenopathy:     She has no cervical adenopathy.   Neurological: She is alert and oriented to person, place, and time. She has normal reflexes. She displays normal reflexes. No cranial nerve deficit.   Skin: Skin is warm and dry. No rash noted.   Psychiatric: She has a normal mood and affect. Her behavior is normal.   Nursing note and vitals reviewed.      LABS  Results for orders placed or performed in visit on 02/25/19   Comprehensive Metabolic Panel   Result Value Ref Range    Glucose 105 (H) 70 - 100 mg/dL    BUN 16 9 - 23 mg/dL    Creatinine 0.77 0.60 - 1.30 mg/dL    Sodium 134 132 - 146 mmol/L    Potassium 4.3 3.5 - 5.5 mmol/L    Chloride 99 99 - 109 mmol/L    CO2 28.0 20.0 - 31.0 mmol/L    Calcium 9.8 8.7 - 10.4 mg/dL    Total Protein 6.4 5.7 - 8.2 g/dL    Albumin 4.60 3.20 - 4.80 g/dL    ALT (SGPT) 28 7 - 40 U/L    AST (SGOT) 29 0 - 33 U/L    Alkaline Phosphatase 46 25 - 100 U/L    Total Bilirubin 0.4 0.3 - 1.2 mg/dL    eGFR Non African Amer 74 >60 mL/min/1.73    Globulin 1.8 gm/dL    A/G Ratio 2.6 (H) 1.5 - 2.5 g/dL    BUN/Creatinine Ratio 20.8 7.0 - 25.0    Anion Gap 7.0 3.0 - 11.0 mmol/L   Lipid Panel   Result Value Ref Range    Total Cholesterol 247 (H) 0 - 200 mg/dL    Triglycerides 68 0 - 150 mg/dL    HDL Cholesterol  89 (H) 40 - 60 mg/dL    LDL Cholesterol  129 0 - 130 mg/dL   TSH   Result Value Ref Range    TSH 3.506 0.350 - 5.350 mIU/mL   T4, Free   Result Value Ref Range    Free T4 1.59 0.89 - 1.76 ng/dL   Hemoglobin A1c   Result Value Ref Range    Hemoglobin A1C 5.90 (H) 4.80 - 5.60 %   CBC Auto Differential   Result Value Ref Range    WBC 5.41 3.50 - 10.80 10*3/mm3    RBC 3.97 3.89 - 5.14 10*6/mm3    Hemoglobin 12.5 11.5 - 15.5 g/dL    Hematocrit 38.3 34.5 - 44.0 %    MCV 96.5 80.0 - 99.0 fL    MCH 31.5 (H) 27.0 - 31.0 pg    MCHC 32.6 32.0 - 36.0 g/dL    RDW 13.5 11.3 - 14.5 %    RDW-SD 47.4 37.0 - 54.0 fl    MPV 10.5 6.0 - 12.0 fL    Platelets 337 150 - 450 10*3/mm3    Neutrophil % 52.7 41.0 - 71.0 %    Lymphocyte % 31.8 24.0 - 44.0 %    Monocyte % 8.5 0.0 - 12.0 %    Eosinophil % 6.1 (H) 0.0 - 3.0 %    Basophil % 0.9 0.0 - 1.0 %    Immature Grans % 0.2 0.0 - 0.6 %    Neutrophils, Absolute 2.85 1.50 - 8.30 10*3/mm3    Lymphocytes, Absolute 1.72 0.60 - 4.80 10*3/mm3    Monocytes, Absolute 0.46 0.00 - 1.00 10*3/mm3    Eosinophils, Absolute 0.33 (H) 0.00 - 0.30 10*3/mm3    Basophils, Absolute 0.05 0.00 - 0.20 10*3/mm3    Immature Grans, Absolute 0.01 0.00 - 0.05 10*3/mm3   Urinalysis without microscopic (no culture) - Urine, Clean Catch   Result Value Ref Range    Color, UA Yellow Yellow, Straw    Appearance, UA Clear Clear    pH, UA 7.5 5.0 - 8.0    Specific Gravity, UA <=1.005 1.001 - 1.030    Glucose, UA Negative Negative    Ketones, UA Negative Negative    Bilirubin, UA Negative Negative    Blood, UA Negative Negative    Protein, UA Negative Negative    Leuk Esterase, UA Negative Negative    Nitrite, UA Negative Negative    Urobilinogen, UA 0.2 E.U./dL 0.2 - 1.0 E.U./dL   Urinalysis, Microscopic Only - Urine, Clean Catch   Result Value Ref Range    RBC, UA 0-2 None Seen, 0-2 /HPF    WBC, UA None Seen None Seen, 0-2 /HPF    Bacteria, UA None Seen None Seen, Trace /HPF    Squamous Epithelial Cells, UA None Seen None Seen,  0-2 /HPF    Hyaline Casts, UA None Seen 0 - 6 /LPF    Methodology Automated Microscopy        ECG 12 Lead  Date/Time: 3/1/2019 12:46 PM  Performed by: Mirna Stack MD  Authorized by: Mirna Stack MD   Comparison: compared with previous ECG from 2/26/2018  Similar to previous ECG  Rhythm: sinus rhythm  Rate: normal  BPM: 68  Conduction: conduction normal  ST Segments: ST segments normal  T Waves: T waves normal  QRS axis: normal  Clinical impression comment: stable EKG            ASSESSMENT/PLAN  Problem List Items Addressed This Visit     Abnormal finding on thyroid function test     TFTs wnl         Hyperlipidemia     Bord lipids with ; goal LDL <130; decrease saturated fats and cholesterol in the diet; repeat lipids in 12 mos           Impaired fasting glucose     BG control stable with A1C 5.9; encouraged reg phys activity to decr insulin resistance, moderation in unhealthy starches/sweets; f/u A1C in 6 mos           Osteoarthritis     Complains of increased knee pain and has had increased nabumetone 750 mg to BID; caution re: chronic NSAIDs; note she takes also ranitidine 150mg BID; awaiting consultation with Dr. Ribeiro (rheum); encouraged resuming strengthening and flexibility exercises (yoga on hold due to the knee pain)         Osteopenia     Calcium and vitamin D supplementation with weight-bearing exercise; DEXA 3/17, repeat 2020            Medicare annual wellness visit, subsequent - Primary     Health maintenance - flu vacc 10/18 at [facility]; Prevnar 2/16, PVX 2/14, Tdap 2012, Zostavax 1/16; hep A 12/18 (#2 due in 6/19); rec  Shingrix vaccine (waiting); mammo [facility] done 2/26/19 - request  records; no further Paps; DEXA 3/17, repeat 2020, colonosc 4/16, repeat 2026 per Dr. Knight, neg AAA 2/14; eye exam 2/25/19 - awaiting consultation with Dr. Davis for cataract surgery; dental exam q6 mos (last done 2 wks ago); (+) seat belt use    Consultants:  Patient Care Team:  Mirna Stack MD as PCP -  General  Aspen Dodson MD as Consulting Physician (Rheumatology)  Nika Gibbs MD as Consulting Physician (Gastroenterology)  Palmer Resendiz MD as Consulting Physician (Medical Oncology)  Delmis Parker MD as Consulting Physician (Dermatology)  Jono Dawson MD (Surgical Oncology)  Holden Knight MD as Consulting Physician (Gastroenterology)  Daniel Rosen DPM (Podiatry)  Brandon John MD as Consulting Physician (Allergy)  Jake Cornelius MD as Consulting Physician (Otolaryngology)  LEFTY Nieto (Optometry)             Urge incontinence of urine     Stable on tolterodine LA 4mg QD (just RX'd x 1 yr in 1/19)         Relevant Medications    tolterodine LA (DETROL LA) 4 MG 24 hr capsule          FOLLOW-UP  RTC 6 mos with A1C    Electronically signed by:    Mirna Stack MD  03/01/2019

## 2019-03-01 NOTE — ASSESSMENT & PLAN NOTE
Health maintenance - flu vacc 10/18 at [facility]; Prevnar 2/16, PVX 2/14, Tdap 2012, Zostavax 1/16; hep A 12/18 (#2 due in 6/19); rec  Shingrix vaccine (waiting); mammo [facility] done 2/26/19 - request  records; no further Paps; DEXA 3/17, repeat 2020, colonosc 4/16, repeat 2026 per Dr. Knight, neg AAA 2/14; eye exam 2/25/19 - awaiting consultation with Dr. Davis for cataract surgery; dental exam q6 mos (last done 2 wks ago); (+) seat belt use    Consultants:  Patient Care Team:  Mirna Stack MD as PCP - General  Aspen Dodson MD as Consulting Physician (Rheumatology)  Nika Gibbs MD as Consulting Physician (Gastroenterology)  Palmer Resendiz MD as Consulting Physician (Medical Oncology)  Delmis Parker MD as Consulting Physician (Dermatology)  Jono Dawson MD (Surgical Oncology)  Holden Knight MD as Consulting Physician (Gastroenterology)  Daniel Rosen DPM (Podiatry)  Brandon John MD as Consulting Physician (Allergy)  Jake Cornelius MD as Consulting Physician (Otolaryngology)  LEFTY Nieto (Optometry)

## 2019-03-01 NOTE — ASSESSMENT & PLAN NOTE
Complains of increased knee pain and has had increased nabumetone 750 mg to BID; caution re: chronic NSAIDs; note she takes also ranitidine 150mg BID; awaiting consultation with Dr. Ribeiro (rheum); encouraged resuming strengthening and flexibility exercises (yoga on hold due to the knee pain)

## 2019-09-06 ENCOUNTER — OFFICE VISIT (OUTPATIENT)
Dept: INTERNAL MEDICINE | Facility: CLINIC | Age: 69
End: 2019-09-06

## 2019-09-06 VITALS
DIASTOLIC BLOOD PRESSURE: 70 MMHG | HEART RATE: 63 BPM | HEIGHT: 64 IN | WEIGHT: 145 LBS | OXYGEN SATURATION: 99 % | SYSTOLIC BLOOD PRESSURE: 124 MMHG | BODY MASS INDEX: 24.75 KG/M2

## 2019-09-06 DIAGNOSIS — R73.01 IMPAIRED FASTING GLUCOSE: Primary | ICD-10-CM

## 2019-09-06 DIAGNOSIS — Z71.85 VACCINE COUNSELING: ICD-10-CM

## 2019-09-06 LAB — HBA1C MFR BLD: 5.8 %

## 2019-09-06 PROCEDURE — 99213 OFFICE O/P EST LOW 20 MIN: CPT | Performed by: INTERNAL MEDICINE

## 2019-09-06 PROCEDURE — 83036 HEMOGLOBIN GLYCOSYLATED A1C: CPT | Performed by: INTERNAL MEDICINE

## 2019-09-06 NOTE — PROGRESS NOTES
"Chief Complaint   Patient presents with   • Impaired fasting glucose       History of Present Illness  68 y.o.  woman presents for sugar follow-up.  Has completed both Shingrix and hep A vaccines.  Feels well overall without complaints.  Has questions about measles - does not remember having measles infection; has 9 other siblings.    Review of Systems  Denies CP, palpitations, SOB, lightheadedness, falls.  All other ROS reviewed and negative.    [facility]  The following portions of the patient's history were reviewed and updated as appropriate: allergies, current medications, past family history, past medical history, past social history, past surgical history and problem list.      Current Outpatient Medications:   •  Cholecalciferol (VITAMIN D) 1000 UNITSQD  •  docusate sodium (STOOL SOFTENER) 100 MG QD prn  •  hyoscyamine sulfate (ANASPAZ) 0.125 MG prn  •  Multiple Vitamin (MULTI VITAMIN DAILY PO), QD  •  nabumetone (RELAFEN) 750 MG tablet,BID prn  •  psyllium (METAMUCIL) 0.52 G capsule, QD  •  raNITIdine (ZANTAC) 150 MG tablet, BID  •  tolterodine LA (DETROL LA) 4 MG QD      VITALS:  /70   Pulse 63   Ht 161.3 cm (63.5\")   Wt 65.8 kg (145 lb)   SpO2 99%   BMI 25.28 kg/m²     Physical Exam   Constitutional: She is oriented to person, place, and time. She appears well-developed and well-nourished.   Down 5 lbs over last 6 mos   Eyes: Conjunctivae and EOM are normal.   Cardiovascular: Normal rate, regular rhythm and normal heart sounds.   Pulmonary/Chest: Effort normal and breath sounds normal. No respiratory distress.   Musculoskeletal:   Normal steady gait   Neurological: She is alert and oriented to person, place, and time.   Skin: Skin is warm and dry.   Psychiatric: She has a normal mood and affect. Her behavior is normal.   Nursing note and vitals reviewed.      LABS  Results for orders placed or performed in visit on 09/06/19   POC Glycosylated Hemoglobin (Hb A1C)   Result Value Ref Range    " Hemoglobin A1C 5.8 %     2/19 A1C 5.9    ASSESSMENT/PLAN  Problem List Items Addressed This Visit     Impaired fasting glucose - Primary     BG control stable with A1C 5.8; encouraged reg phys activity to decr insulin resistance, moderation in unhealthy starches/sweets; f/u A1C in 6 mos           Relevant Orders    POC Glycosylated Hemoglobin (Hb A1C) (Completed)      Other Visit Diagnoses     Vaccine counseling        discussed presumed measles infex if born before 1956; pt interested in checking titers- will do with next labs          FOLLOW-UP  1. Health maintenance - advised to proceed with high-dose flu vacc (none available in office today); has completed both HAV and both Shingrx - will get dates for us from [facility]  2. RT for next wellness 3/2/20; fasting labs prior to appt (CBC, CMP, TSH, lipids, UA/micro, A1C, FT4, measles Ab)     Electronically signed by:    Mirna Stack MD  09/06/2019

## 2020-01-31 ENCOUNTER — OFFICE VISIT (OUTPATIENT)
Dept: INTERNAL MEDICINE | Facility: CLINIC | Age: 70
End: 2020-01-31

## 2020-01-31 VITALS
HEART RATE: 89 BPM | HEIGHT: 64 IN | BODY MASS INDEX: 24.41 KG/M2 | WEIGHT: 143 LBS | SYSTOLIC BLOOD PRESSURE: 128 MMHG | OXYGEN SATURATION: 98 % | DIASTOLIC BLOOD PRESSURE: 74 MMHG

## 2020-01-31 DIAGNOSIS — N64.4 BREAST PAIN, LEFT: Primary | ICD-10-CM

## 2020-01-31 DIAGNOSIS — R10.9 RIGHT SIDED ABDOMINAL PAIN: ICD-10-CM

## 2020-01-31 DIAGNOSIS — N63.20 LEFT BREAST MASS: ICD-10-CM

## 2020-01-31 PROCEDURE — 99214 OFFICE O/P EST MOD 30 MIN: CPT | Performed by: INTERNAL MEDICINE

## 2020-01-31 NOTE — PROGRESS NOTES
"Chief Complaint   Patient presents with   • Breast Mass      LEFT   • Right lower abdominal pain       History of Present Illness  69 y.o.  woman presents for further evaluation of left breast pain, left breast lump, right lower abdominal pain above the right hip bone.  Notes background history of fibrocystic breast disease and therefore is unsure when to worry about breast changes.  Also notes longstanding history of irritable bowel and therefore is unsure when to come in for abdominal pain.  HPI started about a month ago with on and off left breast soreness just to the touch, just inside the left nipple area.  She feels an elongated abnormality in the area.  Has no associated nipple discharge or rash.  Pain comes and goes and has not worsened or improved.  Is not painful enough that she has taken anything for it.  For the last week she has also noticed a pain at the left outer breast when lifting her breast up and pushing inwards.  She notes that the pain is \"on the inside\" and is described as just achy.  Also no lumps in the armpit and no associated rash.  Denies any injuries to the breasts as well.    Regarding the right lower abdominal pain.  First noticed it about a month ago when she resumed exercise ( had heart attack and has been going to cardiac rehab, therefore they have not been going to the gym), went to yoga and did not exercise in which you stretch her hands overhead.  She felt a twinge in the right lower abdomen at that time.  It has been present on and off since then, most prominent over the last week but still rated as mild.  She has occasional nausea that is not related to the abdominal pain.  Has no vomiting.  Eating, urination, or bowel movements do not make the pain better or worse.  It is not reproducible except when doing that motion of raising her arms overhead.  Last BM was this morning and was normal.  Denies any vaginal discharge.  Has concerns because of family history of " ovarian cancer although she has had her ovaries removed.  Notes uterus is intact still.    Review of Systems  ROS (+) for left breast soreness with a sore spot at the inner left breast, also with more recent left lower outer quadrant soreness to touch. Was trying to update her mammo at  and then they told her to see PCP due to breast complaints.  Denies nipple discharge, breast rash, or nodules in the armpit.  States that she drinks 1 cup of coffee in the morning and then an occasional tea in the afternoon.  No other caffeine in the diet.  Acknowledges that caffeine does worsen breast soreness. Has not taken anything for any of these pains due to intermittent and mild nature.    ROS (+) for right lower abdominal pain on and off for the last month.  Reproduced with yoga stretching move with arms overhead.  Reports occasional nausea that is not related to abdominal pain.  It is in stable pattern without associated vomiting.  There is no diarrhea.  Urination is stable.  BMs are normal with no blood.  Denies constipation.  Reports eating, bowel movements, and urination do not make the pain better or worse.  Denies any vaginal discharge or bleeding.  Denies any abdominal rash.  Denies previous similar symptoms but notes history of on and off and abdominal pain attributed to IBS..  All other ROS reviewed and negative.    [facility]  The following portions of the patient's history were reviewed and updated as appropriate: allergies, current medications, past family history, past medical history, past social history, past surgical history and problem list.  FH: ovarian CA - mother, sister    Current Outpatient Medications:   •  Cholecalciferol (VITAMIN D) 1000 UNITS QD  •  docusate sodium (STOOL SOFTENER) 100 MG QD  •  hyoscyamine sulfate (ANASPAZ) 0.125 MG tablet prn  •  Multiple Vitamin (MULTI VITAMIN DAILY PO), QD  •  nabumetone (RELAFEN) 750 MG BID prn  •  psyllium (METAMUCIL) 0.52 G QD  •  tolterodine LA (DETROL LA) 4 MG  "QD      VITALS:  /74   Pulse 89   Ht 161.3 cm (63.5\")   Wt 64.9 kg (143 lb)   SpO2 98%   BMI 24.93 kg/m²     Physical Exam   Constitutional: She is oriented to person, place, and time. She appears well-developed and well-nourished.   Eyes: Conjunctivae and EOM are normal.   Cardiovascular: Normal rate, regular rhythm and normal heart sounds.   Pulmonary/Chest: Effort normal and breath sounds normal. No respiratory distress. She has no wheezes. She has no rales.   Abdominal: Soft. Bowel sounds are normal. She exhibits no distension and no mass. There is tenderness (no RLQ tend reproducible with palpation; area of tend is just lateral to right recus abdominus area with firm smooth mass-like fullness to touch; no tend in other regions of the abdomen, no guarding, no rebound, no rash). There is no rebound and no guarding.   Genitourinary:   Genitourinary Comments: Chaperone declined by patient; right breast with minimal fibrocystic changes, no masses, rashes, axillary adenopathy; left breast with diffuse fibrocystic changes; area of tenderness at the edge of the left areola 7:00 elongated linear fibrocystic change and then 1 cm medially with a pea-sized well-circumscribed smooth nodule, nontender; no left nipple discharge; left outer breast lower quadrant at 5:00 about 2 inches from the areola, also mildly tender to palpation, no masses palpated; no left axillary adenopathy   Neurological: She is alert and oriented to person, place, and time.   Skin: Skin is warm and dry. No rash noted. No erythema.   Psychiatric: She has a normal mood and affect. Her behavior is normal.   Nursing note and vitals reviewed.      LABS  Results for orders placed or performed in visit on 09/06/19   POC Glycosylated Hemoglobin (Hb A1C)   Result Value Ref Range    Hemoglobin A1C 5.8 %       ASSESSMENT/PLAN  Problem List Items Addressed This Visit     None      Visit Diagnoses     Breast pain, left    -  Primary    likely due to " fibrocystic changes - left inner breast at 7:00 areola and left outer lower breast at 5:00 2 in from areola; limit caffeine; await dx mammo    Relevant Orders    Mammo Diagnostic Digital Tomosynthesis Bilateral With CAD    Left breast mass        7:00 areolar edge with elongated areas as well as pea-sized nodule; await dx mammo at     Relevant Orders    Mammo Diagnostic Digital Tomosynthesis Bilateral With CAD    Right sided abdominal pain        firm mass-like change at right mid-abd, ddx uterine vs bowel/constipation; check pelvic u/s for further eval; s/p BSO but uterus remains; no vag bleeding    Relevant Orders    US Pelvis Complete          FOLLOW-UP  RTC for next wellness 3/2/20; fasting labs prior to appt (CBC, CMP, TSH, lipids, UA/micro, A1C, FT4, measles IgG    Electronically signed by:    Mirna Stack MD, FACP  01/31/2020

## 2020-02-04 ENCOUNTER — HOSPITAL ENCOUNTER (OUTPATIENT)
Dept: ULTRASOUND IMAGING | Facility: HOSPITAL | Age: 70
Discharge: HOME | End: 2020-02-04
Admitting: INTERNAL MEDICINE

## 2020-02-04 DIAGNOSIS — R10.9 RIGHT SIDED ABDOMINAL PAIN: ICD-10-CM

## 2020-02-04 PROCEDURE — 76856 US EXAM PELVIC COMPLETE: CPT

## 2020-02-06 ENCOUNTER — TELEPHONE (OUTPATIENT)
Dept: INTERNAL MEDICINE | Facility: CLINIC | Age: 70
End: 2020-02-06

## 2020-02-06 NOTE — TELEPHONE ENCOUNTER
----- Message from Mirna Stack MD sent at 2/5/2020  5:29 PM EST -----  Pelvic ultrasound is normal.  Uterus normal; ovaries absent.

## 2020-02-10 ENCOUNTER — TELEPHONE (OUTPATIENT)
Dept: INTERNAL MEDICINE | Facility: CLINIC | Age: 70
End: 2020-02-10

## 2020-02-10 DIAGNOSIS — R94.6 ABNORMAL FINDING ON THYROID FUNCTION TEST: ICD-10-CM

## 2020-02-10 DIAGNOSIS — E78.00 PURE HYPERCHOLESTEROLEMIA: ICD-10-CM

## 2020-02-10 DIAGNOSIS — Z00.00 MEDICARE ANNUAL WELLNESS VISIT, SUBSEQUENT: Primary | ICD-10-CM

## 2020-02-10 DIAGNOSIS — R73.01 IMPAIRED FASTING GLUCOSE: ICD-10-CM

## 2020-02-24 ENCOUNTER — LAB (OUTPATIENT)
Dept: LAB | Facility: HOSPITAL | Age: 70
End: 2020-02-24

## 2020-02-24 DIAGNOSIS — Z00.00 MEDICARE ANNUAL WELLNESS VISIT, SUBSEQUENT: ICD-10-CM

## 2020-02-24 DIAGNOSIS — R94.6 ABNORMAL FINDING ON THYROID FUNCTION TEST: ICD-10-CM

## 2020-02-24 DIAGNOSIS — R73.01 IMPAIRED FASTING GLUCOSE: ICD-10-CM

## 2020-02-24 DIAGNOSIS — E78.00 PURE HYPERCHOLESTEROLEMIA: ICD-10-CM

## 2020-02-24 LAB
ALBUMIN SERPL-MCNC: 4.2 G/DL (ref 3.5–5.2)
ALBUMIN/GLOB SERPL: 1.9 G/DL
ALP SERPL-CCNC: 43 U/L (ref 39–117)
ALT SERPL W P-5'-P-CCNC: 19 U/L (ref 1–33)
ANION GAP SERPL CALCULATED.3IONS-SCNC: 9.6 MMOL/L (ref 5–15)
AST SERPL-CCNC: 23 U/L (ref 1–32)
BACTERIA UR QL AUTO: ABNORMAL /HPF
BASOPHILS # BLD AUTO: 0.06 10*3/MM3 (ref 0–0.2)
BASOPHILS NFR BLD AUTO: 1.2 % (ref 0–1.5)
BILIRUB SERPL-MCNC: 0.2 MG/DL (ref 0.2–1.2)
BILIRUB UR QL STRIP: NEGATIVE
BUN BLD-MCNC: 16 MG/DL (ref 8–23)
BUN/CREAT SERPL: 23.9 (ref 7–25)
CALCIUM SPEC-SCNC: 9.3 MG/DL (ref 8.6–10.5)
CHLORIDE SERPL-SCNC: 101 MMOL/L (ref 98–107)
CHOLEST SERPL-MCNC: 221 MG/DL (ref 0–200)
CLARITY UR: CLEAR
CO2 SERPL-SCNC: 26.4 MMOL/L (ref 22–29)
COLOR UR: YELLOW
CREAT BLD-MCNC: 0.67 MG/DL (ref 0.57–1)
DEPRECATED RDW RBC AUTO: 44.7 FL (ref 37–54)
EOSINOPHIL # BLD AUTO: 0.4 10*3/MM3 (ref 0–0.4)
EOSINOPHIL NFR BLD AUTO: 7.7 % (ref 0.3–6.2)
ERYTHROCYTE [DISTWIDTH] IN BLOOD BY AUTOMATED COUNT: 12.9 % (ref 12.3–15.4)
GFR SERPL CREATININE-BSD FRML MDRD: 87 ML/MIN/1.73
GLOBULIN UR ELPH-MCNC: 2.2 GM/DL
GLUCOSE BLD-MCNC: 88 MG/DL (ref 65–99)
GLUCOSE UR STRIP-MCNC: NEGATIVE MG/DL
HBA1C MFR BLD: 6.02 % (ref 4.8–5.6)
HCT VFR BLD AUTO: 35.5 % (ref 34–46.6)
HDLC SERPL-MCNC: 88 MG/DL (ref 40–60)
HGB BLD-MCNC: 11.9 G/DL (ref 12–15.9)
HGB UR QL STRIP.AUTO: NEGATIVE
HYALINE CASTS UR QL AUTO: ABNORMAL /LPF
IMM GRANULOCYTES # BLD AUTO: 0.02 10*3/MM3 (ref 0–0.05)
IMM GRANULOCYTES NFR BLD AUTO: 0.4 % (ref 0–0.5)
KETONES UR QL STRIP: NEGATIVE
LDLC SERPL CALC-MCNC: 118 MG/DL (ref 0–100)
LDLC/HDLC SERPL: 1.34 {RATIO}
LEUKOCYTE ESTERASE UR QL STRIP.AUTO: ABNORMAL
LYMPHOCYTES # BLD AUTO: 1.61 10*3/MM3 (ref 0.7–3.1)
LYMPHOCYTES NFR BLD AUTO: 31 % (ref 19.6–45.3)
MCH RBC QN AUTO: 31.4 PG (ref 26.6–33)
MCHC RBC AUTO-ENTMCNC: 33.5 G/DL (ref 31.5–35.7)
MCV RBC AUTO: 93.7 FL (ref 79–97)
MONOCYTES # BLD AUTO: 0.41 10*3/MM3 (ref 0.1–0.9)
MONOCYTES NFR BLD AUTO: 7.9 % (ref 5–12)
NEUTROPHILS # BLD AUTO: 2.7 10*3/MM3 (ref 1.7–7)
NEUTROPHILS NFR BLD AUTO: 51.8 % (ref 42.7–76)
NITRITE UR QL STRIP: NEGATIVE
NRBC BLD AUTO-RTO: 0 /100 WBC (ref 0–0.2)
PH UR STRIP.AUTO: 7 [PH] (ref 5–8)
PLATELET # BLD AUTO: 315 10*3/MM3 (ref 140–450)
PMV BLD AUTO: 10.2 FL (ref 6–12)
POTASSIUM BLD-SCNC: 4.6 MMOL/L (ref 3.5–5.2)
PROT SERPL-MCNC: 6.4 G/DL (ref 6–8.5)
PROT UR QL STRIP: NEGATIVE
RBC # BLD AUTO: 3.79 10*6/MM3 (ref 3.77–5.28)
RBC # UR: ABNORMAL /HPF
REF LAB TEST METHOD: ABNORMAL
SODIUM BLD-SCNC: 137 MMOL/L (ref 136–145)
SP GR UR STRIP: 1.01 (ref 1–1.03)
SQUAMOUS #/AREA URNS HPF: ABNORMAL /HPF
T4 FREE SERPL-MCNC: 1.34 NG/DL (ref 0.93–1.7)
TRIGL SERPL-MCNC: 74 MG/DL (ref 0–150)
TSH SERPL DL<=0.05 MIU/L-ACNC: 4.35 UIU/ML (ref 0.27–4.2)
UROBILINOGEN UR QL STRIP: ABNORMAL
VLDLC SERPL-MCNC: 14.8 MG/DL (ref 5–40)
WBC NRBC COR # BLD: 5.2 10*3/MM3 (ref 3.4–10.8)
WBC UR QL AUTO: ABNORMAL /HPF

## 2020-02-24 PROCEDURE — 84443 ASSAY THYROID STIM HORMONE: CPT | Performed by: INTERNAL MEDICINE

## 2020-02-24 PROCEDURE — 80061 LIPID PANEL: CPT | Performed by: INTERNAL MEDICINE

## 2020-02-24 PROCEDURE — 80053 COMPREHEN METABOLIC PANEL: CPT | Performed by: INTERNAL MEDICINE

## 2020-02-24 PROCEDURE — 85025 COMPLETE CBC W/AUTO DIFF WBC: CPT | Performed by: INTERNAL MEDICINE

## 2020-02-24 PROCEDURE — 84439 ASSAY OF FREE THYROXINE: CPT | Performed by: INTERNAL MEDICINE

## 2020-02-24 PROCEDURE — 86765 RUBEOLA ANTIBODY: CPT | Performed by: INTERNAL MEDICINE

## 2020-02-24 PROCEDURE — 81001 URINALYSIS AUTO W/SCOPE: CPT | Performed by: INTERNAL MEDICINE

## 2020-02-24 PROCEDURE — 83036 HEMOGLOBIN GLYCOSYLATED A1C: CPT | Performed by: INTERNAL MEDICINE

## 2020-02-25 LAB — MEV IGG SER IA-ACNC: POSITIVE

## 2020-02-28 PROBLEM — M54.2 NECK PAIN: Status: RESOLVED | Noted: 2018-01-26 | Resolved: 2020-02-28

## 2020-03-02 ENCOUNTER — OFFICE VISIT (OUTPATIENT)
Dept: INTERNAL MEDICINE | Facility: CLINIC | Age: 70
End: 2020-03-02

## 2020-03-02 VITALS
HEIGHT: 64 IN | OXYGEN SATURATION: 98 % | HEART RATE: 73 BPM | WEIGHT: 145 LBS | DIASTOLIC BLOOD PRESSURE: 68 MMHG | SYSTOLIC BLOOD PRESSURE: 124 MMHG | BODY MASS INDEX: 24.75 KG/M2

## 2020-03-02 DIAGNOSIS — N39.41 URGE INCONTINENCE OF URINE: ICD-10-CM

## 2020-03-02 DIAGNOSIS — D64.9 ANEMIA, UNSPECIFIED TYPE: ICD-10-CM

## 2020-03-02 DIAGNOSIS — R94.6 ABNORMAL FINDING ON THYROID FUNCTION TEST: ICD-10-CM

## 2020-03-02 DIAGNOSIS — M85.89 OSTEOPENIA OF MULTIPLE SITES: ICD-10-CM

## 2020-03-02 DIAGNOSIS — Z78.0 MENOPAUSE: ICD-10-CM

## 2020-03-02 DIAGNOSIS — E78.00 PURE HYPERCHOLESTEROLEMIA: ICD-10-CM

## 2020-03-02 DIAGNOSIS — R73.01 IMPAIRED FASTING GLUCOSE: ICD-10-CM

## 2020-03-02 DIAGNOSIS — Z00.00 MEDICARE ANNUAL WELLNESS VISIT, SUBSEQUENT: Primary | ICD-10-CM

## 2020-03-02 PROBLEM — Z14.8 ALPHA-1-ANTITRYPSIN DEFICIENCY CARRIER: Status: ACTIVE | Noted: 2020-03-02

## 2020-03-02 PROCEDURE — 99397 PER PM REEVAL EST PAT 65+ YR: CPT | Performed by: INTERNAL MEDICINE

## 2020-03-02 PROCEDURE — 99497 ADVNCD CARE PLAN 30 MIN: CPT | Performed by: INTERNAL MEDICINE

## 2020-03-02 PROCEDURE — 93000 ELECTROCARDIOGRAM COMPLETE: CPT | Performed by: INTERNAL MEDICINE

## 2020-03-02 PROCEDURE — G0444 DEPRESSION SCREEN ANNUAL: HCPCS | Performed by: INTERNAL MEDICINE

## 2020-03-02 PROCEDURE — G0439 PPPS, SUBSEQ VISIT: HCPCS | Performed by: INTERNAL MEDICINE

## 2020-03-02 RX ORDER — TOLTERODINE 4 MG/1
4 CAPSULE, EXTENDED RELEASE ORAL DAILY
Qty: 90 CAPSULE | Refills: 3 | Status: SHIPPED | OUTPATIENT
Start: 2020-03-02 | End: 2021-03-07 | Stop reason: SDUPTHER

## 2020-03-02 NOTE — PROGRESS NOTES
ANNUAL WELLNESS VISIT    DRUG AND ALCOHOL USE      no alcohol use, no tobacco use and caffeine intake: 1 cups of caffeinated coffee per day    DIET AND PHYSICAL ACTIVITY     Diet: general    Exercise: frequently   Exercise Details: walking    MOOD DISORDER AND COGNITIVE SCREENING   Depression Screening Tool Used yes - see PHQ-9; components reviewed with patient; 15-min counseling done -questionnaire items reviewed with patient; patient indicates she does not feel down or depressed and does not have issues with finding pleasure in doing things.  She does complain of some difficulties with staying asleep but does not feel tired or complain of decreased energy.  Denies any issues with appetite, negative thoughts, feeling badly, concentration, or moving slowly.  Screening for depression is NEG.     Anxiety Screening Tool Used yes     Mini-Cog Performed   Yes    1. Tell Patient 3 Words apple table donna    2. Administer Clock Test normal    3. Recall 3 words  apple table donna    4. Number Correct Items 3    FUNCTIONAL ABILITY AND LEVEL OF SAFETY   Hearing no hearing loss     Wears Hearing Aids No       Current Activities Independent      none  - see Funct/Cog Status Intake     Fall Risk Assessment       Has difficulty with walking or balance  No         Timed Up and Go (TUG) Test  8 sec.       If >12 sec, normal    ADVANCED DIRECTIVE  Advance Care Planning   ACP discussion was held with the patient during this visit. Patient has an advance directive that is valid in EMR.      Advance Care Planning Discussion:  15 min counseling done; patient has advanced directive and living will.  Reviewed desires for end of life care, which is to have comfort care.  Patient does not want extraordinary life-sustaining measures, including no chest compression, shocks, intubation/ventilator use, feeding tube, or prolonged artificial life support.  Encouraged patient to ensure family is aware of desires/preferences. Code status:  DNR      PAIN SCREENING Do you have pain right now? yes      If so, 1-10 scale: 3     Intermittent     Do you have pain every day? Yes      Probable chronic pain: Yes     Recent Hospitalizations:  No recent hospitalization(s)..     MEDICATION REVIEW   - updated and reviewed (see Medication List).   - reviewed for potentially harmful drug-disease interactions in the elderly.   - reviewed for high risk medications in the elderly.   - aspirin use: No    BMI  Body mass index is 25.28 kg/m².    Patient's Body mass index is 25.28 kg/m². BMI is above normal parameters. Recommendations include: exercise counseling and nutrition counseling.    _________________________________________________________    Chief Complaint   Patient presents with   • Medicare Wellness-subsequent   • Hyperlipidemia       History of Present Illness  69 y.o.  woman presents for updated phys examination and wellness visit. Reports grandson has been dx'd with alpha 1 antitrypsin deficiency, thus making and most likely that patient is a carrier and her son is a carrier as well.    Review of Systems  Denies headaches, visual changes, CP, palpitations, SOB, cough, abd pain, n/v/d, difficulty with urination (has stable unchanged incontinence), numbness/tingling, falls, mood changes, lightheadedness, hearing changes, rashes.    Denies melena, hematochezia, vaginal bleeding, or other overt bleeding.    Denies vaginal discharge or bleeding (no periods) or breast concerns.   All other ROS reviewed and negative.    [facility]  The following portions of the patient's history were reviewed and updated as appropriate: allergies, current medications, past family history, past medical history, past social history, past surgical history and problem list.  SH: 5 sisters on Synthroid    Current Outpatient Medications:   •  Cholecalciferol (VITAMIN D) 1000 UNITS QD  •  docusate sodium (STOOL SOFTENER) 100 MG QD prn  •  hyoscyamine sulfate (ANASPAZ) 0.125 MG prn  •  " Multiple Vitamin (MULTI VITAMIN DAILY PO), QD  •  nabumetone (RELAFEN) 750 MG tablet, BID prn  •  psyllium (METAMUCIL) 0.52 G QD  •  tolterodine LA (DETROL LA) 4 MG QD      VITALS:  /68   Pulse 73   Ht 161.3 cm (63.5\")   Wt 65.8 kg (145 lb)   SpO2 98%   BMI 25.28 kg/m²     Physical Exam   Constitutional: She is oriented to person, place, and time. She appears well-developed and well-nourished.   HENT:   Head: Normocephalic.   Right Ear: External ear normal.   Left Ear: External ear normal.   Nose: Nose normal.   Mouth/Throat: Oropharynx is clear and moist and mucous membranes are normal. No oropharyngeal exudate.   Eyes: Pupils are equal, round, and reactive to light. Conjunctivae and EOM are normal.   Neck: Normal range of motion. Neck supple. Carotid bruit is not present (bilaterally). No thyromegaly present.   Cardiovascular: Normal rate, regular rhythm and normal heart sounds.   Pulmonary/Chest: Effort normal and breath sounds normal. No respiratory distress. She has no wheezes. She has no rales.   Abdominal: Soft. Bowel sounds are normal. She exhibits no distension and no mass. There is no hepatosplenomegaly. There is no tenderness.   Genitourinary:   Genitourinary Comments: declines breast exam   Musculoskeletal: She exhibits no edema.   Lymphadenopathy:     She has no cervical adenopathy.   Neurological: She is alert and oriented to person, place, and time. She displays normal reflexes. No cranial nerve deficit.   Skin: Skin is warm and dry. No rash noted.   Psychiatric: She has a normal mood and affect. Her behavior is normal.   Nursing note and vitals reviewed.    LABS  Results for orders placed or performed in visit on 02/24/20   Comprehensive Metabolic Panel   Result Value Ref Range    Glucose 88 65 - 99 mg/dL    BUN 16 8 - 23 mg/dL    Creatinine 0.67 0.57 - 1.00 mg/dL    Sodium 137 136 - 145 mmol/L    Potassium 4.6 3.5 - 5.2 mmol/L    Chloride 101 98 - 107 mmol/L    CO2 26.4 22.0 - 29.0 " mmol/L    Calcium 9.3 8.6 - 10.5 mg/dL    Total Protein 6.4 6.0 - 8.5 g/dL    Albumin 4.20 3.50 - 5.20 g/dL    ALT (SGPT) 19 1 - 33 U/L    AST (SGOT) 23 1 - 32 U/L    Alkaline Phosphatase 43 39 - 117 U/L    Total Bilirubin 0.2 0.2 - 1.2 mg/dL    eGFR Non African Amer 87 >60 mL/min/1.73    Globulin 2.2 gm/dL    A/G Ratio 1.9 g/dL    BUN/Creatinine Ratio 23.9 7.0 - 25.0    Anion Gap 9.6 5.0 - 15.0 mmol/L   Lipid Panel   Result Value Ref Range    Total Cholesterol 221 (H) 0 - 200 mg/dL    Triglycerides 74 0 - 150 mg/dL    HDL Cholesterol 88 (H) 40 - 60 mg/dL    LDL Cholesterol  118 (H) 0 - 100 mg/dL    VLDL Cholesterol 14.8 5 - 40 mg/dL    LDL/HDL Ratio 1.34    TSH   Result Value Ref Range    TSH 4.350 (H) 0.270 - 4.200 uIU/mL   Hemoglobin A1c   Result Value Ref Range    Hemoglobin A1C 6.02 (H) 4.80 - 5.60 %   T4, Free   Result Value Ref Range    Free T4 1.34 0.93 - 1.70 ng/dL   Rubeola Antibody IgG   Result Value Ref Range    Rubeola IgG Positive    CBC Auto Differential   Result Value Ref Range    WBC 5.20 3.40 - 10.80 10*3/mm3    RBC 3.79 3.77 - 5.28 10*6/mm3    Hemoglobin 11.9 (L) 12.0 - 15.9 g/dL    Hematocrit 35.5 34.0 - 46.6 %    MCV 93.7 79.0 - 97.0 fL    MCH 31.4 26.6 - 33.0 pg    MCHC 33.5 31.5 - 35.7 g/dL    RDW 12.9 12.3 - 15.4 %    RDW-SD 44.7 37.0 - 54.0 fl    MPV 10.2 6.0 - 12.0 fL    Platelets 315 140 - 450 10*3/mm3    Neutrophil % 51.8 42.7 - 76.0 %    Lymphocyte % 31.0 19.6 - 45.3 %    Monocyte % 7.9 5.0 - 12.0 %    Eosinophil % 7.7 (H) 0.3 - 6.2 %    Basophil % 1.2 0.0 - 1.5 %    Immature Grans % 0.4 0.0 - 0.5 %    Neutrophils, Absolute 2.70 1.70 - 7.00 10*3/mm3    Lymphocytes, Absolute 1.61 0.70 - 3.10 10*3/mm3    Monocytes, Absolute 0.41 0.10 - 0.90 10*3/mm3    Eosinophils, Absolute 0.40 0.00 - 0.40 10*3/mm3    Basophils, Absolute 0.06 0.00 - 0.20 10*3/mm3    Immature Grans, Absolute 0.02 0.00 - 0.05 10*3/mm3    nRBC 0.0 0.0 - 0.2 /100 WBC   Urinalysis without microscopic (no culture) - Urine,  Clean Catch   Result Value Ref Range    Color, UA Yellow Yellow, Straw    Appearance, UA Clear Clear    pH, UA 7.0 5.0 - 8.0    Specific Gravity, UA 1.013 1.005 - 1.030    Glucose, UA Negative Negative    Ketones, UA Negative Negative    Bilirubin, UA Negative Negative    Blood, UA Negative Negative    Protein, UA Negative Negative    Leuk Esterase, UA Trace (A) Negative    Nitrite, UA Negative Negative    Urobilinogen, UA 0.2 E.U./dL 0.2 - 1.0 E.U./dL   Urinalysis, Microscopic Only - Urine, Clean Catch   Result Value Ref Range    RBC, UA 0-2 None Seen, 0-2 /HPF    WBC, UA 0-2 None Seen, 0-2 /HPF    Bacteria, UA 4+ (A) None Seen /HPF    Squamous Epithelial Cells, UA 0-2 None Seen, 0-2 /HPF    Hyaline Casts, UA None Seen None Seen /LPF    Methodology Automated Microscopy      9/19 A1C 5.8    2/19 H&H 12.5/38.3      ECG 12 Lead  Date/Time: 3/2/2020 9:49 AM  Performed by: Mirna Stack MD  Authorized by: Mirna Stack MD   Comparison: compared with previous ECG from 3/1/2019  Similar to previous ECG  Rhythm: sinus rhythm  Rate: normal  BPM: 70  Conduction: conduction normal  ST Segments: ST segments normal  T Waves: T waves normal  QRS axis: normal    Clinical impression: normal ECG  Clinical impression comment: stable EKG            ASSESSMENT/PLAN  Problem List Items Addressed This Visit     Urge incontinence of urine     Stable on tolterodine LD 4mg QD #90, 3RF         Relevant Medications    tolterodine LA (DETROL LA) 4 MG 24 hr capsule    Osteopenia     Calcium and vitamin D supplementation with weight-bearing exercise; DEXA ordered (last 3/17)            Menopause    Relevant Orders    DEXA Bone Density Axial    Medicare annual wellness visit, subsequent - Primary     Health maintenance - flu vacc 9/19; Prevnar 2/16, PVX 2/14, Tdap 2012 (next Td due 2022), Zostavax 1/16; HAV done; Shingrix done; mammo [facility] 2/26/19, repeat 2021 per [facility] surg onc; no further Paps; DEXA ordered (last 3/17) colonosc 4/16, repeat 2026  per Dr. Knight, neg AAA 2/14; eye exam done 2/20 with Dr. Nieto; dental exam q6 mos; (+) seat belt use    Consultants:  Patient Care Team:  Mirna Stack MD as PCP - Nika Easton MD as Consulting Physician (Gastroenterology)  Oliver Resendiz MD as Consulting Physician (Medical Oncology)  Delmis Parker MD as Consulting Physician (Dermatology)  Jono Dawson MD (Surgical Oncology)  Holden Knight MD as Consulting Physician (Gastroenterology)  Daniel Rosen DPM (Podiatry)  Brandon John MD as Consulting Physician (Allergy)  Jake Cornelius MD as Consulting Physician (Otolaryngology)  Colt Nieto, OD (Optometry)  David Davis MD as Consulting Physician (Ophthalmology)  Nettie Ribeiro MD as Consulting Physician (Rheumatology)  Bri Llamas DDS (Dental General Practice)  Bony Valdez APRN as Nurse Practitioner (Obstetrics and Gynecology)             Impaired fasting glucose     BG control stable with A1C 6.0; encouraged reg phys activity to decr insulin resistance, moderation in unhealthy starches/sweets; f/u A1C in 3 mos           Relevant Orders    Hemoglobin A1c    Hyperlipidemia     Stable lipids with ; no meds         Relevant Orders    ECG 12 Lead    Abnormal finding on thyroid function test     Bord TFTs with TSH 4,35 and nl FT4 1.34; follow clinically - repeat TFTs in 3 mos for serial examination         Relevant Orders    TSH    T4, Free      Other Visit Diagnoses     Anemia, unspecified type        bord H&H 11.9/35.3; nl colonosc 4/16, asx; repeat CBC with ferritin, Fe panel, B12, folate; consider checking Cologuard    Relevant Orders    CBC & Differential    Ferritin    Iron Profile    Vitamin B12    Folate          FOLLOW-UP  RTC 3 mos with CBC, iron panel, ferritin, B12, folate, TFTs, and A1C (escribed)    Electronically signed by:    Mirna Stack MD, FACP  03/02/2020

## 2020-04-16 ENCOUNTER — APPOINTMENT (OUTPATIENT)
Dept: BONE DENSITY | Facility: HOSPITAL | Age: 70
End: 2020-04-16

## 2020-04-28 DIAGNOSIS — N39.41 URGE INCONTINENCE OF URINE: ICD-10-CM

## 2020-04-28 RX ORDER — TOLTERODINE 4 MG/1
CAPSULE, EXTENDED RELEASE ORAL
Qty: 90 CAPSULE | Refills: 3 | OUTPATIENT
Start: 2020-04-28

## 2020-06-01 ENCOUNTER — LAB (OUTPATIENT)
Dept: LAB | Facility: HOSPITAL | Age: 70
End: 2020-06-01

## 2020-06-01 DIAGNOSIS — D64.9 ANEMIA, UNSPECIFIED TYPE: ICD-10-CM

## 2020-06-01 DIAGNOSIS — R73.01 IMPAIRED FASTING GLUCOSE: ICD-10-CM

## 2020-06-01 DIAGNOSIS — R94.6 ABNORMAL FINDING ON THYROID FUNCTION TEST: ICD-10-CM

## 2020-06-01 LAB
BASOPHILS # BLD AUTO: 0.08 10*3/MM3 (ref 0–0.2)
BASOPHILS NFR BLD AUTO: 1.4 % (ref 0–1.5)
DEPRECATED RDW RBC AUTO: 43.2 FL (ref 37–54)
EOSINOPHIL # BLD AUTO: 0.49 10*3/MM3 (ref 0–0.4)
EOSINOPHIL NFR BLD AUTO: 8.9 % (ref 0.3–6.2)
ERYTHROCYTE [DISTWIDTH] IN BLOOD BY AUTOMATED COUNT: 12.5 % (ref 12.3–15.4)
FERRITIN SERPL-MCNC: 77.1 NG/ML (ref 13–150)
FOLATE SERPL-MCNC: >20 NG/ML (ref 4.78–24.2)
HBA1C MFR BLD: 6.1 % (ref 4.8–5.6)
HCT VFR BLD AUTO: 36.5 % (ref 34–46.6)
HGB BLD-MCNC: 12.5 G/DL (ref 12–15.9)
IMM GRANULOCYTES # BLD AUTO: 0.01 10*3/MM3 (ref 0–0.05)
IMM GRANULOCYTES NFR BLD AUTO: 0.2 % (ref 0–0.5)
IRON 24H UR-MRATE: 97 MCG/DL (ref 37–145)
IRON SATN MFR SERPL: 23 % (ref 20–50)
LYMPHOCYTES # BLD AUTO: 1.69 10*3/MM3 (ref 0.7–3.1)
LYMPHOCYTES NFR BLD AUTO: 30.6 % (ref 19.6–45.3)
MCH RBC QN AUTO: 32.1 PG (ref 26.6–33)
MCHC RBC AUTO-ENTMCNC: 34.2 G/DL (ref 31.5–35.7)
MCV RBC AUTO: 93.8 FL (ref 79–97)
MONOCYTES # BLD AUTO: 0.6 10*3/MM3 (ref 0.1–0.9)
MONOCYTES NFR BLD AUTO: 10.9 % (ref 5–12)
NEUTROPHILS # BLD AUTO: 2.65 10*3/MM3 (ref 1.7–7)
NEUTROPHILS NFR BLD AUTO: 48 % (ref 42.7–76)
NRBC BLD AUTO-RTO: 0 /100 WBC (ref 0–0.2)
PLATELET # BLD AUTO: 356 10*3/MM3 (ref 140–450)
PMV BLD AUTO: 10.5 FL (ref 6–12)
RBC # BLD AUTO: 3.89 10*6/MM3 (ref 3.77–5.28)
T4 FREE SERPL-MCNC: 1.5 NG/DL (ref 0.93–1.7)
TIBC SERPL-MCNC: 429 MCG/DL (ref 298–536)
TRANSFERRIN SERPL-MCNC: 288 MG/DL (ref 200–360)
TSH SERPL DL<=0.05 MIU/L-ACNC: 4.99 UIU/ML (ref 0.27–4.2)
VIT B12 BLD-MCNC: 679 PG/ML (ref 211–946)
WBC NRBC COR # BLD: 5.52 10*3/MM3 (ref 3.4–10.8)

## 2020-06-01 PROCEDURE — 83540 ASSAY OF IRON: CPT | Performed by: INTERNAL MEDICINE

## 2020-06-01 PROCEDURE — 82728 ASSAY OF FERRITIN: CPT | Performed by: INTERNAL MEDICINE

## 2020-06-01 PROCEDURE — 84443 ASSAY THYROID STIM HORMONE: CPT | Performed by: INTERNAL MEDICINE

## 2020-06-01 PROCEDURE — 84439 ASSAY OF FREE THYROXINE: CPT | Performed by: INTERNAL MEDICINE

## 2020-06-01 PROCEDURE — 83036 HEMOGLOBIN GLYCOSYLATED A1C: CPT | Performed by: INTERNAL MEDICINE

## 2020-06-01 PROCEDURE — 85025 COMPLETE CBC W/AUTO DIFF WBC: CPT | Performed by: INTERNAL MEDICINE

## 2020-06-01 PROCEDURE — 84466 ASSAY OF TRANSFERRIN: CPT | Performed by: INTERNAL MEDICINE

## 2020-06-01 PROCEDURE — 82607 VITAMIN B-12: CPT | Performed by: INTERNAL MEDICINE

## 2020-06-01 PROCEDURE — 82746 ASSAY OF FOLIC ACID SERUM: CPT | Performed by: INTERNAL MEDICINE

## 2020-06-06 NOTE — ASSESSMENT & PLAN NOTE
BG control stable with a1C 6.1; encouraged reg phys activity to decr insulin resistance, moderation in unhealthy starches/sweets; f/u A1C in 9 mos with next wellness

## 2020-06-08 ENCOUNTER — OFFICE VISIT (OUTPATIENT)
Dept: INTERNAL MEDICINE | Facility: CLINIC | Age: 70
End: 2020-06-08

## 2020-06-08 VITALS
OXYGEN SATURATION: 97 % | HEIGHT: 64 IN | HEART RATE: 74 BPM | WEIGHT: 144 LBS | SYSTOLIC BLOOD PRESSURE: 118 MMHG | BODY MASS INDEX: 24.59 KG/M2 | DIASTOLIC BLOOD PRESSURE: 60 MMHG

## 2020-06-08 DIAGNOSIS — D64.9 ANEMIA, UNSPECIFIED TYPE: ICD-10-CM

## 2020-06-08 DIAGNOSIS — R94.6 ABNORMAL FINDING ON THYROID FUNCTION TEST: ICD-10-CM

## 2020-06-08 DIAGNOSIS — R73.01 IMPAIRED FASTING GLUCOSE: Primary | ICD-10-CM

## 2020-06-08 PROCEDURE — 99214 OFFICE O/P EST MOD 30 MIN: CPT | Performed by: INTERNAL MEDICINE

## 2020-06-08 NOTE — PROGRESS NOTES
"Chief Complaint   Patient presents with   • Impaired fasting glucose       History of Present Illness  69 y.o.  woman presents for sugar, thyroid, and anemia follow-up.  Feels well and has no complaints.  No lightheadedness or dizziness.  Getting outdoors despite COVID 19 restrictions.  Mood is stable.    Review of Systems  Denies chest pain, palpitation, shortness of breath, falls, lightheadedness, dizziness.  All other ROS reviewed and negative.    [facility]  The following portions of the patient's history were reviewed and updated as appropriate: allergies, current medications, past family history, past medical history, past social history, past surgical history and problem list.      Current Outpatient Medications:   •  Cholecalciferol (VITAMIN D) 1000 UNITS QD  •  docusate sodium (STOOL SOFTENER) 100 MG QD  •  hyoscyamine sulfate (ANASPAZ) 0.125 MG prn  •  Multiple Vitamin (MULTI VITAMIN DAILY PO), QD  •  nabumetone (RELAFEN) 750 MG BID  •  psyllium (METAMUCIL) 0.52 G QD  •  tolterodine LA (DETROL LA) 4 MG QD    VITALS:  /60   Pulse 74   Ht 161.3 cm (63.5\")   Wt 65.3 kg (144 lb)   SpO2 97%   BMI 25.11 kg/m²     Physical Exam   Constitutional: She is oriented to person, place, and time. She appears well-developed and well-nourished.   Eyes: Conjunctivae and EOM are normal.   Cardiovascular: Normal rate, regular rhythm and normal heart sounds.   Pulmonary/Chest: Effort normal and breath sounds normal. No respiratory distress.   Neurological: She is alert and oriented to person, place, and time.   Psychiatric: She has a normal mood and affect. Her behavior is normal.   Nursing note and vitals reviewed.      LABS  Results for orders placed or performed in visit on 06/01/20   Ferritin   Result Value Ref Range    Ferritin 77.10 13.00 - 150.00 ng/mL   Iron Profile   Result Value Ref Range    Iron 97 37 - 145 mcg/dL    Iron Saturation 23 20 - 50 %    Transferrin 288 200 - 360 mg/dL    TIBC 429 298 - 536 " mcg/dL   Vitamin B12   Result Value Ref Range    Vitamin B-12 679 211 - 946 pg/mL   Folate   Result Value Ref Range    Folate >20.00 4.78 - 24.20 ng/mL   Hemoglobin A1c   Result Value Ref Range    Hemoglobin A1C 6.10 (H) 4.80 - 5.60 %   TSH   Result Value Ref Range    TSH 4.990 (H) 0.270 - 4.200 uIU/mL   T4, Free   Result Value Ref Range    Free T4 1.50 0.93 - 1.70 ng/dL   CBC Auto Differential   Result Value Ref Range    WBC 5.52 3.40 - 10.80 10*3/mm3    RBC 3.89 3.77 - 5.28 10*6/mm3    Hemoglobin 12.5 12.0 - 15.9 g/dL    Hematocrit 36.5 34.0 - 46.6 %    MCV 93.8 79.0 - 97.0 fL    MCH 32.1 26.6 - 33.0 pg    MCHC 34.2 31.5 - 35.7 g/dL    RDW 12.5 12.3 - 15.4 %    RDW-SD 43.2 37.0 - 54.0 fl    MPV 10.5 6.0 - 12.0 fL    Platelets 356 140 - 450 10*3/mm3    Neutrophil % 48.0 42.7 - 76.0 %    Lymphocyte % 30.6 19.6 - 45.3 %    Monocyte % 10.9 5.0 - 12.0 %    Eosinophil % 8.9 (H) 0.3 - 6.2 %    Basophil % 1.4 0.0 - 1.5 %    Immature Grans % 0.2 0.0 - 0.5 %    Neutrophils, Absolute 2.65 1.70 - 7.00 10*3/mm3    Lymphocytes, Absolute 1.69 0.70 - 3.10 10*3/mm3    Monocytes, Absolute 0.60 0.10 - 0.90 10*3/mm3    Eosinophils, Absolute 0.49 (H) 0.00 - 0.40 10*3/mm3    Basophils, Absolute 0.08 0.00 - 0.20 10*3/mm3    Immature Grans, Absolute 0.01 0.00 - 0.05 10*3/mm3    nRBC 0.0 0.0 - 0.2 /100 WBC     2/20 A1c 6.02    ASSESSMENT/PLAN  Problem List Items Addressed This Visit        Endocrine    Impaired fasting glucose - Primary     BG control stable with a1C 6.1; encouraged reg phys activity to decr insulin resistance, moderation in unhealthy starches/sweets; f/u A1C in 9 mos with next wellness              Other    Abnormal finding on thyroid function test     Persistent bord TFTs; reviewed s/sxs hypohyroidism; follow clinically           Other Visit Diagnoses     Anemia, unspecified type        improved with nl ferritin, B12, and folate levels; follow clinically; asx          FOLLOW-UP  1.  Health maintenance  -DEXA pending  7/31/2020  2.  RTC for next wellness 3/8/21; fasting labs prior to appt (CBC, CMP, TSH, lipids, UA/micro, A1C, FT4)    Electronically signed by:    Mirna Stack MD, FACP  06/08/2020    EMR Dragon/Transcription disclaimer:  Parts of this encounter note is an electronic transcription/translation of spoken language to printed text. Electronic translation of spoken language may permit erroneous, or at times, nonsensical words or phrases, to be inadvertently transcribed. Although I have reviewed the note for such errors, some may still exist.

## 2020-07-31 ENCOUNTER — HOSPITAL ENCOUNTER (OUTPATIENT)
Dept: BONE DENSITY | Facility: HOSPITAL | Age: 70
Discharge: HOME | End: 2020-07-31
Admitting: INTERNAL MEDICINE

## 2020-07-31 DIAGNOSIS — Z78.0 MENOPAUSE: ICD-10-CM

## 2020-07-31 PROCEDURE — 77080 DXA BONE DENSITY AXIAL: CPT

## 2020-08-01 NOTE — PROGRESS NOTES
Dear Ms. Estrada,    Thank you for obtaining your DEXA (bone density) scan.  I have received those results and would like to review them with you.      Your bone density remains in the osteopenic range.  This is between normal and osteoporosis and is basically stable as compared to your last DEXA in 2017.      The values indicate that your risk of a major fracture in the next 10 years is 10%.    Please maintain regular calcium and vitamin D supplementation.  Calcium intake should be 1000mg per day between diet and supplements.  Weight bearing exercise is good for bone health as well.    We should plan to repeat your DEXA in 3 years.  Please let me know if you have any questions or concerns regarding these results.        Sincerely,  Mirna Stack MD, FACP

## 2021-01-26 ENCOUNTER — IMMUNIZATION (OUTPATIENT)
Dept: VACCINE CLINIC | Facility: HOSPITAL | Age: 71
End: 2021-01-26

## 2021-01-26 PROCEDURE — 91300 HC SARSCOV02 VAC 30MCG/0.3ML IM: CPT | Performed by: INTERNAL MEDICINE

## 2021-01-26 PROCEDURE — 0001A: CPT | Performed by: INTERNAL MEDICINE

## 2021-02-16 ENCOUNTER — IMMUNIZATION (OUTPATIENT)
Dept: VACCINE CLINIC | Facility: HOSPITAL | Age: 71
End: 2021-02-16

## 2021-02-16 PROCEDURE — 0002A: CPT | Performed by: INTERNAL MEDICINE

## 2021-02-16 PROCEDURE — 91300 HC SARSCOV02 VAC 30MCG/0.3ML IM: CPT | Performed by: INTERNAL MEDICINE

## 2021-02-25 ENCOUNTER — TELEPHONE (OUTPATIENT)
Dept: INTERNAL MEDICINE | Facility: CLINIC | Age: 71
End: 2021-02-25

## 2021-02-25 DIAGNOSIS — E78.00 PURE HYPERCHOLESTEROLEMIA: ICD-10-CM

## 2021-02-25 DIAGNOSIS — Z00.00 MEDICARE ANNUAL WELLNESS VISIT, SUBSEQUENT: Primary | ICD-10-CM

## 2021-02-25 DIAGNOSIS — R73.01 IMPAIRED FASTING GLUCOSE: ICD-10-CM

## 2021-03-01 ENCOUNTER — LAB (OUTPATIENT)
Dept: LAB | Facility: HOSPITAL | Age: 71
End: 2021-03-01

## 2021-03-01 DIAGNOSIS — Z00.00 MEDICARE ANNUAL WELLNESS VISIT, SUBSEQUENT: ICD-10-CM

## 2021-03-01 DIAGNOSIS — R73.01 IMPAIRED FASTING GLUCOSE: ICD-10-CM

## 2021-03-01 DIAGNOSIS — E78.00 PURE HYPERCHOLESTEROLEMIA: ICD-10-CM

## 2021-03-01 LAB
ALBUMIN SERPL-MCNC: 4.5 G/DL (ref 3.5–5.2)
ALBUMIN/GLOB SERPL: 2 G/DL
ALP SERPL-CCNC: 53 U/L (ref 39–117)
ALT SERPL W P-5'-P-CCNC: 15 U/L (ref 1–33)
ANION GAP SERPL CALCULATED.3IONS-SCNC: 9.4 MMOL/L (ref 5–15)
AST SERPL-CCNC: 20 U/L (ref 1–32)
BACTERIA UR QL AUTO: ABNORMAL /HPF
BASOPHILS # BLD AUTO: 0.07 10*3/MM3 (ref 0–0.2)
BASOPHILS NFR BLD AUTO: 1.3 % (ref 0–1.5)
BILIRUB SERPL-MCNC: 0.4 MG/DL (ref 0–1.2)
BILIRUB UR QL STRIP: NEGATIVE
BUN SERPL-MCNC: 18 MG/DL (ref 8–23)
BUN/CREAT SERPL: 28.1 (ref 7–25)
CALCIUM SPEC-SCNC: 9.2 MG/DL (ref 8.6–10.5)
CHLORIDE SERPL-SCNC: 100 MMOL/L (ref 98–107)
CHOLEST SERPL-MCNC: 228 MG/DL (ref 0–200)
CLARITY UR: CLEAR
CO2 SERPL-SCNC: 28.6 MMOL/L (ref 22–29)
COLOR UR: YELLOW
CREAT SERPL-MCNC: 0.64 MG/DL (ref 0.57–1)
DEPRECATED RDW RBC AUTO: 42 FL (ref 37–54)
EOSINOPHIL # BLD AUTO: 0.68 10*3/MM3 (ref 0–0.4)
EOSINOPHIL NFR BLD AUTO: 12.2 % (ref 0.3–6.2)
ERYTHROCYTE [DISTWIDTH] IN BLOOD BY AUTOMATED COUNT: 12.5 % (ref 12.3–15.4)
GFR SERPL CREATININE-BSD FRML MDRD: 91 ML/MIN/1.73
GLOBULIN UR ELPH-MCNC: 2.3 GM/DL
GLUCOSE SERPL-MCNC: 96 MG/DL (ref 65–99)
GLUCOSE UR STRIP-MCNC: NEGATIVE MG/DL
HBA1C MFR BLD: 5.8 % (ref 4.8–5.6)
HCT VFR BLD AUTO: 36.3 % (ref 34–46.6)
HDLC SERPL-MCNC: 85 MG/DL (ref 40–60)
HGB BLD-MCNC: 12.6 G/DL (ref 12–15.9)
HGB UR QL STRIP.AUTO: NEGATIVE
HYALINE CASTS UR QL AUTO: ABNORMAL /LPF
IMM GRANULOCYTES # BLD AUTO: 0.02 10*3/MM3 (ref 0–0.05)
IMM GRANULOCYTES NFR BLD AUTO: 0.4 % (ref 0–0.5)
KETONES UR QL STRIP: NEGATIVE
LDLC SERPL CALC-MCNC: 133 MG/DL (ref 0–100)
LDLC/HDLC SERPL: 1.55 {RATIO}
LEUKOCYTE ESTERASE UR QL STRIP.AUTO: NEGATIVE
LYMPHOCYTES # BLD AUTO: 1.97 10*3/MM3 (ref 0.7–3.1)
LYMPHOCYTES NFR BLD AUTO: 35.4 % (ref 19.6–45.3)
MCH RBC QN AUTO: 32.1 PG (ref 26.6–33)
MCHC RBC AUTO-ENTMCNC: 34.7 G/DL (ref 31.5–35.7)
MCV RBC AUTO: 92.4 FL (ref 79–97)
MONOCYTES # BLD AUTO: 0.59 10*3/MM3 (ref 0.1–0.9)
MONOCYTES NFR BLD AUTO: 10.6 % (ref 5–12)
NEUTROPHILS NFR BLD AUTO: 2.23 10*3/MM3 (ref 1.7–7)
NEUTROPHILS NFR BLD AUTO: 40.1 % (ref 42.7–76)
NITRITE UR QL STRIP: NEGATIVE
NRBC BLD AUTO-RTO: 0.2 /100 WBC (ref 0–0.2)
PH UR STRIP.AUTO: 7 [PH] (ref 5–8)
PLATELET # BLD AUTO: 319 10*3/MM3 (ref 140–450)
PMV BLD AUTO: 10.5 FL (ref 6–12)
POTASSIUM SERPL-SCNC: 4.9 MMOL/L (ref 3.5–5.2)
PROT SERPL-MCNC: 6.8 G/DL (ref 6–8.5)
PROT UR QL STRIP: NEGATIVE
RBC # BLD AUTO: 3.93 10*6/MM3 (ref 3.77–5.28)
RBC # UR: ABNORMAL /HPF
REF LAB TEST METHOD: ABNORMAL
SODIUM SERPL-SCNC: 138 MMOL/L (ref 136–145)
SP GR UR STRIP: 1.02 (ref 1–1.03)
SQUAMOUS #/AREA URNS HPF: ABNORMAL /HPF
T4 FREE SERPL-MCNC: 1.49 NG/DL (ref 0.93–1.7)
TRIGL SERPL-MCNC: 57 MG/DL (ref 0–150)
TSH SERPL DL<=0.05 MIU/L-ACNC: 5.17 UIU/ML (ref 0.27–4.2)
UROBILINOGEN UR QL STRIP: NORMAL
VLDLC SERPL-MCNC: 10 MG/DL (ref 5–40)
WBC # BLD AUTO: 5.56 10*3/MM3 (ref 3.4–10.8)
WBC UR QL AUTO: ABNORMAL /HPF

## 2021-03-01 PROCEDURE — 83036 HEMOGLOBIN GLYCOSYLATED A1C: CPT

## 2021-03-01 PROCEDURE — 85025 COMPLETE CBC W/AUTO DIFF WBC: CPT

## 2021-03-01 PROCEDURE — 80061 LIPID PANEL: CPT

## 2021-03-01 PROCEDURE — 84443 ASSAY THYROID STIM HORMONE: CPT

## 2021-03-01 PROCEDURE — 80053 COMPREHEN METABOLIC PANEL: CPT

## 2021-03-01 PROCEDURE — 81001 URINALYSIS AUTO W/SCOPE: CPT

## 2021-03-01 PROCEDURE — 84439 ASSAY OF FREE THYROXINE: CPT

## 2021-03-07 NOTE — ASSESSMENT & PLAN NOTE
Health maintenance - flu vacc 8/20; Prevnar 2/16, PVX 2/14, Tdap 2012 (next Td due 2022), Zostavax 1/16; HAV and Shingrix done; COVID19 vaccine done; mammo [facility] TBD 4/8/21 (due to proximity to COVID19 vacc); no further Paps; DEXA 7/20, repeat 2023, colonosc 4/16, repeat 2026 per Dr. Knight, neg AAA 2/14; eye exam w/ Dr. Nieto 2020, pending 3/18/21; dental exam q6 mos; (+) seat belt use    Consultants:  Patient Care Team:  Mirna Stcak MD as PCP - General  Nika Gibbs MD as Consulting Physician (Gastroenterology)  Oliver Resendiz MD as Consulting Physician (Medical Oncology)  Delmis Parker MD as Consulting Physician (Dermatology)  Jono Dawson MD (Surgical Oncology)  Holden Knight MD as Consulting Physician (Gastroenterology)  Daniel Rosen DPM (Podiatry)  Brandon John MD as Consulting Physician (Allergy)  Jake Cornelius MD as Consulting Physician (Otolaryngology)  Colt Nieto OD (Optometry)  David Davis MD as Consulting Physician (Ophthalmology)  Nettie Ribeiro MD as Consulting Physician (Rheumatology)  Bri Llamas DDS (Dental General Practice)  Bony Valdez APRN as Nurse Practitioner (Obstetrics and Gynecology)

## 2021-03-07 NOTE — PROGRESS NOTES
ANNUAL WELLNESS VISIT    DRUG AND ALCOHOL USE      no alcohol use, no tobacco use and caffeine intake: 1 cups of caffeinated coffee per day    DIET AND PHYSICAL ACTIVITY     Diet: general    Exercise: daily   Exercise Details: walking    MOOD DISORDER AND COGNITIVE SCREENING   Depression Screening Tool Used yes - see PHQ-9; components reviewed with patient; 15-min counseling done - questionnaire items reviewed with patient indicating not feeling depressed, blue, or hopeless. Has some nighttime awakening around 3am and then has some difficulty falling asleep. Denies concerns with appetite, energy, concentration, slow movements, slow thoughts, negative thoughts. Screening is NEG for depression. Patient has been able to see her grandchildren but still wears her mask.   Anxiety Screening Tool Used yes     Mini-Cog Performed   Yes    1. Tell Patient 3 Words apple table watch    2. Administer Clock Test normal    3. Recall 3 words  apple table watch    4. Number Correct Items 3    FUNCTIONAL ABILITY AND LEVEL OF SAFETY   Hearing no hearing loss     Wears Hearing Aids No       Current Activities Independent      none  - see Funct/Cog Status Intake     Fall Risk Assessment       Has difficulty with walking or balance  No         Timed Up and Go (TUG) Test  8 sec.       If >12 sec, normal    ADVANCED DIRECTIVE  Advance Care Planning   ACP discussion was held with the patient during this visit. Patient has an advance directive in EMR which is still valid.   Advance Care Planning Discussion:  16 min or more spent on counseling; patient has advanced directive and living will.  Reviewed desires for end of life care, which is to have comfort care.  Patient does not want extraordinary life-sustaining measures, including no chest compression, shocks, intubation/ventilator use, feeding tube, or prolonged artificial life support.  Reviewed code status options, meanings, desires. Patient 's code status is DNR.   Encouraged patient  "to ensure family is aware of desires/preferences.      PAIN SCREENING Do you have pain right now? yes      If so, 1-10 scale: 2     Constant     Do you have pain every day? Yes      Probable chronic pain: Yes     Recent Hospitalizations:  No recent hospitalization(s)..     MEDICATION REVIEW   - updated and reviewed (see Medication List).   - reviewed for potentially harmful drug-disease interactions in the elderly.   - reviewed for high risk medications in the elderly.   - aspirin use: No    BMI  Body mass index is 26.93 kg/m².    Patient's Body mass index is 26.93 kg/m². BMI is above normal parameters. Recommendations include: exercise counseling and nutrition counseling.    _________________________________________________________    Chief Complaint   Patient presents with   • Medicare Wellness-subsequent   • Hyperlipidemia       History of Present Illness  70 y.o.  woman presents for updated phys examination and wellness visit as well as sugar and cholesterol follow-up.  had heart attack in 9/20 - successful stent (she called 911 for his \"indigestion\").     Review of Systems  Denies headaches, visual changes, CP, palpitations, SOB, cough, abd pain, n/v/d, difficulty with urination, numbness/tingling, falls, mood changes, lightheadedness, hearing changes, rashes.    Denies vaginal discharge or bleeding (no periods) or breast concerns.   All other ROS reviewed and negative.    Current Outpatient Medications:   •  Cholecalciferol (VITAMIN D) 1000 UNITS QD  •  Multiple Vitamin QD  •  nabumetone (RELAFEN) 750 MG BID  •  psyllium (METAMUCIL) 0.52 G QD:   •  tolterodine LA (DETROL LA) 4 MG QD      VITALS:  /72   Pulse 76   Ht 160 cm (63\")   Wt 68.9 kg (152 lb)   SpO2 99%   BMI 26.93 kg/m²     Physical Exam  Vitals and nursing note reviewed.   Constitutional:       General: She is not in acute distress.     Appearance: Normal appearance. She is well-developed.   HENT:      Head: Normocephalic. "      Right Ear: Ear canal and external ear normal.      Left Ear: Ear canal and external ear normal.      Nose: Nose normal.   Eyes:      Extraocular Movements: Extraocular movements intact.      Conjunctiva/sclera: Conjunctivae normal.      Pupils: Pupils are equal, round, and reactive to light.   Neck:      Vascular: No carotid bruit (bilaterally).   Cardiovascular:      Rate and Rhythm: Normal rate and regular rhythm.      Heart sounds: Normal heart sounds.   Pulmonary:      Effort: Pulmonary effort is normal. No respiratory distress.      Breath sounds: Normal breath sounds. No wheezing or rales.   Abdominal:      General: Bowel sounds are normal. There is no distension.      Palpations: Abdomen is soft. There is no mass.      Tenderness: There is no abdominal tenderness.   Genitourinary:     Comments: Chaperone declined by patient.    Breast exam unremarkable without masses, skin changes, nipple discharge, or axillary adenopathy.    Musculoskeletal:      Cervical back: Normal range of motion and neck supple.      Right lower leg: No edema.      Left lower leg: No edema.   Lymphadenopathy:      Cervical: No cervical adenopathy.   Skin:     General: Skin is warm and dry.      Findings: No rash.   Neurological:      Mental Status: She is alert and oriented to person, place, and time.      Cranial Nerves: No cranial nerve deficit.      Deep Tendon Reflexes: Reflexes normal.   Psychiatric:         Mood and Affect: Mood normal.         Behavior: Behavior normal.           LABS  Results for orders placed or performed in visit on 03/01/21   Comprehensive Metabolic Panel    Specimen: Blood   Result Value Ref Range    Glucose 96 65 - 99 mg/dL    BUN 18 8 - 23 mg/dL    Creatinine 0.64 0.57 - 1.00 mg/dL    Sodium 138 136 - 145 mmol/L    Potassium 4.9 3.5 - 5.2 mmol/L    Chloride 100 98 - 107 mmol/L    CO2 28.6 22.0 - 29.0 mmol/L    Calcium 9.2 8.6 - 10.5 mg/dL    Total Protein 6.8 6.0 - 8.5 g/dL    Albumin 4.50 3.50 - 5.20  g/dL    ALT (SGPT) 15 1 - 33 U/L    AST (SGOT) 20 1 - 32 U/L    Alkaline Phosphatase 53 39 - 117 U/L    Total Bilirubin 0.4 0.0 - 1.2 mg/dL    eGFR Non African Amer 91 >60 mL/min/1.73    Globulin 2.3 gm/dL    A/G Ratio 2.0 g/dL    BUN/Creatinine Ratio 28.1 (H) 7.0 - 25.0    Anion Gap 9.4 5.0 - 15.0 mmol/L   Lipid Panel    Specimen: Blood   Result Value Ref Range    Total Cholesterol 228 (H) 0 - 200 mg/dL    Triglycerides 57 0 - 150 mg/dL    HDL Cholesterol 85 (H) 40 - 60 mg/dL    LDL Cholesterol  133 (H) 0 - 100 mg/dL    VLDL Cholesterol 10 5 - 40 mg/dL    LDL/HDL Ratio 1.55    TSH    Specimen: Blood   Result Value Ref Range    TSH 5.170 (H) 0.270 - 4.200 uIU/mL   Hemoglobin A1c    Specimen: Blood   Result Value Ref Range    Hemoglobin A1C 5.80 (H) 4.80 - 5.60 %   T4, Free    Specimen: Blood   Result Value Ref Range    Free T4 1.49 0.93 - 1.70 ng/dL   CBC Auto Differential    Specimen: Blood   Result Value Ref Range    WBC 5.56 3.40 - 10.80 10*3/mm3    RBC 3.93 3.77 - 5.28 10*6/mm3    Hemoglobin 12.6 12.0 - 15.9 g/dL    Hematocrit 36.3 34.0 - 46.6 %    MCV 92.4 79.0 - 97.0 fL    MCH 32.1 26.6 - 33.0 pg    MCHC 34.7 31.5 - 35.7 g/dL    RDW 12.5 12.3 - 15.4 %    RDW-SD 42.0 37.0 - 54.0 fl    MPV 10.5 6.0 - 12.0 fL    Platelets 319 140 - 450 10*3/mm3    Neutrophil % 40.1 (L) 42.7 - 76.0 %    Lymphocyte % 35.4 19.6 - 45.3 %    Monocyte % 10.6 5.0 - 12.0 %    Eosinophil % 12.2 (H) 0.3 - 6.2 %    Basophil % 1.3 0.0 - 1.5 %    Immature Grans % 0.4 0.0 - 0.5 %    Neutrophils, Absolute 2.23 1.70 - 7.00 10*3/mm3    Lymphocytes, Absolute 1.97 0.70 - 3.10 10*3/mm3    Monocytes, Absolute 0.59 0.10 - 0.90 10*3/mm3    Eosinophils, Absolute 0.68 (H) 0.00 - 0.40 10*3/mm3    Basophils, Absolute 0.07 0.00 - 0.20 10*3/mm3    Immature Grans, Absolute 0.02 0.00 - 0.05 10*3/mm3    nRBC 0.2 0.0 - 0.2 /100 WBC   Urinalysis without microscopic (no culture) - Urine, Clean Catch    Specimen: Urine, Clean Catch   Result Value Ref Range     Color, UA Yellow Yellow, Straw    Appearance, UA Clear Clear    pH, UA 7.0 5.0 - 8.0    Specific Gravity, UA 1.018 1.005 - 1.030    Glucose, UA Negative Negative    Ketones, UA Negative Negative    Bilirubin, UA Negative Negative    Blood, UA Negative Negative    Protein, UA Negative Negative    Leuk Esterase, UA Negative Negative    Nitrite, UA Negative Negative    Urobilinogen, UA 0.2 E.U./dL 0.2 - 1.0 E.U./dL   Urinalysis, Microscopic Only - Urine, Clean Catch    Specimen: Urine, Clean Catch   Result Value Ref Range    RBC, UA 0-2 None Seen, 0-2 /HPF    WBC, UA 3-5 (A) None Seen, 0-2 /HPF    Bacteria, UA None Seen None Seen /HPF    Squamous Epithelial Cells, UA 0-2 None Seen, 0-2 /HPF    Hyaline Casts, UA None Seen None Seen /LPF    Methodology Automated Microscopy      6/20 A1C 6.1    2/20       ECG 12 Lead    Date/Time: 3/8/2021 8:30 AM  Performed by: Mirna Stack MD  Authorized by: Mirna Stack MD   Comparison: compared with previous ECG from 3/2/2020  Similar to previous ECG  Rhythm: sinus rhythm  Rate: normal  BPM: 75  Conduction: conduction normal  ST Segments: ST segments normal  T Waves: T waves normal  QRS axis: normal  Clinical impression comment: stable EKG              ASSESSMENT/PLAN    Diagnoses and all orders for this visit:    1. Medicare annual wellness visit, subsequent (Primary)  Assessment & Plan:  Health maintenance - flu vacc 8/20; Prevnar 2/16, PVX 2/14, Tdap 2012 (next Td due 2022), Zostavax 1/16; HAV and Shingrix done; COVID19 vaccine done; mammo [facility] TBD 4/8/21 (due to proximity to COVID19 vacc); no further Paps; DEXA 7/20, repeat 2023, colonosc 4/16, repeat 2026 per Dr. Knight, neg AAA 2/14; eye exam w/ Dr. Nieto 2020, pending 3/18/21; dental exam q6 mos; (+) seat belt use    Consultants:  Patient Care Team:  Mirna Stack MD as PCP - Nika Easton MD as Consulting Physician (Gastroenterology)  Oliver Resendiz MD as Consulting Physician (Medical  Oncology)  Delmis Parker MD as Consulting Physician (Dermatology)  Jono Dawson MD (Surgical Oncology)  Holden Knight MD as Consulting Physician (Gastroenterology)  Daniel Rosen DPM (Podiatry)  Brandon John MD as Consulting Physician (Allergy)  Jake Cornelius MD as Consulting Physician (Otolaryngology)  Colt Nieto, LEFTY (Optometry)  David Davis MD as Consulting Physician (Ophthalmology)  Nettie Ribeiro MD as Consulting Physician (Rheumatology)  Bri Llamas DDS (Dental General Practice)  Bony Valdez APRN as Nurse Practitioner (Obstetrics and Gynecology)          2. Hyperlipidemia  Assessment & Plan:  Lipids mildly worsened with ; no meds; decrease saturated fats and cholesterol in the diet; repeat lipids in 12 mos      Orders:  -     ECG 12 Lead    3. Impaired fasting glucose  Assessment & Plan:  BG control stable with A1C 5.8; encouraged reg phys activity to decr insulin resistance, moderation in unhealthy starches/sweets; f/u A1C in 6 mos        4. Abnormal finding on thyroid function test  Assessment & Plan:  TFTs remain bord, c/w subclinical hypothyroidism; reviewed s/sxs - follow clinically      5. Osteopenia  Assessment & Plan:  Calcium and vitamin D supplementation with weight-bearing exercise; DEXA 7/20, repeat 2023         6. Urge incontinence of urine  Assessment & Plan:  Stable on tolterodine LA 4mg every day #90, 3RF    Orders:  -     tolterodine LA (DETROL LA) 4 MG 24 hr capsule; Take 1 capsule by mouth Daily.  Dispense: 90 capsule; Refill: 3      FOLLOW-UP  RTC 6 mos with A1C    Electronically signed by:    Mirna Stack MD, FACP  03/08/2021

## 2021-03-07 NOTE — ASSESSMENT & PLAN NOTE
Lipids mildly worsened with ; no meds; decrease saturated fats and cholesterol in the diet; repeat lipids in 12 mos

## 2021-03-08 ENCOUNTER — OFFICE VISIT (OUTPATIENT)
Dept: INTERNAL MEDICINE | Facility: CLINIC | Age: 71
End: 2021-03-08

## 2021-03-08 VITALS
SYSTOLIC BLOOD PRESSURE: 118 MMHG | WEIGHT: 152 LBS | HEART RATE: 76 BPM | DIASTOLIC BLOOD PRESSURE: 72 MMHG | OXYGEN SATURATION: 99 % | BODY MASS INDEX: 26.93 KG/M2 | HEIGHT: 63 IN

## 2021-03-08 DIAGNOSIS — Z00.00 MEDICARE ANNUAL WELLNESS VISIT, SUBSEQUENT: Primary | ICD-10-CM

## 2021-03-08 DIAGNOSIS — N39.41 URGE INCONTINENCE OF URINE: ICD-10-CM

## 2021-03-08 DIAGNOSIS — R94.6 ABNORMAL FINDING ON THYROID FUNCTION TEST: ICD-10-CM

## 2021-03-08 DIAGNOSIS — R73.01 IMPAIRED FASTING GLUCOSE: ICD-10-CM

## 2021-03-08 DIAGNOSIS — M85.89 OSTEOPENIA OF MULTIPLE SITES: ICD-10-CM

## 2021-03-08 DIAGNOSIS — E78.00 PURE HYPERCHOLESTEROLEMIA: ICD-10-CM

## 2021-03-08 PROCEDURE — 1159F MED LIST DOCD IN RCRD: CPT | Performed by: INTERNAL MEDICINE

## 2021-03-08 PROCEDURE — G0439 PPPS, SUBSEQ VISIT: HCPCS | Performed by: INTERNAL MEDICINE

## 2021-03-08 PROCEDURE — 93000 ELECTROCARDIOGRAM COMPLETE: CPT | Performed by: INTERNAL MEDICINE

## 2021-03-08 PROCEDURE — 1126F AMNT PAIN NOTED NONE PRSNT: CPT | Performed by: INTERNAL MEDICINE

## 2021-03-08 PROCEDURE — 99497 ADVNCD CARE PLAN 30 MIN: CPT | Performed by: INTERNAL MEDICINE

## 2021-03-08 PROCEDURE — G0444 DEPRESSION SCREEN ANNUAL: HCPCS | Performed by: INTERNAL MEDICINE

## 2021-03-08 PROCEDURE — 99397 PER PM REEVAL EST PAT 65+ YR: CPT | Performed by: INTERNAL MEDICINE

## 2021-03-08 PROCEDURE — 1170F FXNL STATUS ASSESSED: CPT | Performed by: INTERNAL MEDICINE

## 2021-03-08 RX ORDER — TOLTERODINE 4 MG/1
4 CAPSULE, EXTENDED RELEASE ORAL DAILY
Qty: 90 CAPSULE | Refills: 3 | Status: SHIPPED | OUTPATIENT
Start: 2021-03-08 | End: 2022-09-16 | Stop reason: SDUPTHER

## 2021-04-19 DIAGNOSIS — N64.4 BREAST PAIN, LEFT: ICD-10-CM

## 2021-04-19 DIAGNOSIS — N63.20 LEFT BREAST MASS: ICD-10-CM

## 2021-05-13 ENCOUNTER — OFFICE VISIT (OUTPATIENT)
Dept: ORTHOPEDIC SURGERY | Facility: CLINIC | Age: 71
End: 2021-05-13

## 2021-05-13 VITALS
DIASTOLIC BLOOD PRESSURE: 73 MMHG | HEIGHT: 63 IN | SYSTOLIC BLOOD PRESSURE: 151 MMHG | HEART RATE: 87 BPM | WEIGHT: 142.8 LBS | BODY MASS INDEX: 25.3 KG/M2

## 2021-05-13 DIAGNOSIS — M25.551 RIGHT HIP PAIN: ICD-10-CM

## 2021-05-13 DIAGNOSIS — M16.11 PRIMARY OSTEOARTHRITIS OF RIGHT HIP: Primary | ICD-10-CM

## 2021-05-13 PROCEDURE — 99204 OFFICE O/P NEW MOD 45 MIN: CPT | Performed by: ORTHOPAEDIC SURGERY

## 2021-05-13 RX ORDER — PREGABALIN 75 MG/1
75 CAPSULE ORAL ONCE
Status: CANCELLED | OUTPATIENT
Start: 2021-05-13 | End: 2021-05-13

## 2021-05-13 RX ORDER — ACETAMINOPHEN 325 MG/1
1000 TABLET ORAL ONCE
Status: CANCELLED | OUTPATIENT
Start: 2021-05-13 | End: 2021-05-13

## 2021-05-13 RX ORDER — MELOXICAM 7.5 MG/1
15 TABLET ORAL ONCE
Status: CANCELLED | OUTPATIENT
Start: 2021-05-13 | End: 2021-05-13

## 2021-05-13 NOTE — PROGRESS NOTES
Orthopaedic Clinic Note: Hip New Patient    Chief Complaint   Patient presents with   • Right Hip - Pain        HPI  Consult from: Quintin Borden MD    Natalia Estrada is a 70 y.o. female who presents with right hip pain for 3 month(s). Onset atraumatic and gradual in nature. Pain is localized to groin and lateral trochanter and is a 7/10 on the pain scale.Pain is described as aching and throbbing. Associated symptoms include pain and stiffness.  The pain is worse with rising from seated position; heat improve the pain. Previous treatments have included: NSAIDS since symptom onset. Although some transient relief was reported with these interventions, these conservative measures have failed and symptoms have persisted. The patient is limited in daily activities and has had a significant decrease in quality of life as a result. She denies fevers, chills, or constitutional symptoms.    I have reviewed the following portions of the patient's history:History of Present Illness    Past Medical History:   Diagnosis Date   • Abdominal pain, left lower quadrant 4/29/2016    Impression: 3/2/17 normal abd CT (except mild interstitial scarring lung bases); 11/14/2014 - ddx includes colon spasm, diverticulitis, less likely reflux, gastritis, pancreatitis, GYN etiology (h/o YULIYA/BSO but note (+)FH ovarian CA); with exam findings and hx, proceed with trial of SL levsin; if no better, plan to check labs and CT for further evaluation; note last colonoscopy done 2010 (to be re   • Diarrhea 4/29/2016    Impression: 05/22/2014 - reassuring that sxs come/go; discontinue stool softener until resolution of sxs; monitor for food triggers; add fiber supplement 1x/day;  consider addition of probiotic but rec 30d trial if she proceeds; f/u if sxs do not resolve;    • Hx of AAA ultrasound 02/28/2014    neg AAA ultrasound (2/28/14)   • Hx of bone density study 03/07/2014    DEXA (3/7/14): L -1.4, H -1.6   • Hx of bone density study 03/16/2017     DEXA (3/16/17) L -1.7, H -1.3, repeat 3 yrs   • Hx of bone density study 07/31/2020    DEXA (7/31/20): L -1.6, H -1.3, repeat 3 yrs   • Hx of colonoscopy 04/18/2016    colonosc (4/18/16): hyperplastic polyp, diverticulosis, repeat 10 yrs; GI - Dr. Knight   • Hx of echocardiogram 03/16/2014    ECHO (2/17): mild concentric LVH and diastolic dysfunction, mild MR/TR/AR   • Hx of pelvic ultrasound 02/05/2020    nl pelvic u/s (2/5/20); ovaries absent   • Hx of thyroid ultrasound 01/30/2018    nl thyr u/s (1/30/18)      Past Surgical History:   Procedure Laterality Date   • BILATERAL OOPHORECTOMY  02/2003    due to (+)FH ovarian CA; GYN - Dr. Resendiz   • INCISIONAL BREAST BIOPSY      x2 (5/71 and 12/81)surg - Dr. Dawson      Family History   Problem Relation Age of Onset   • Goiter Sister    • Heart attack Sister         tob   • Stroke Sister    • Alpha-1 antitrypsin deficiency Sister         carrier   • Arthritis Sister    • Hyperlipidemia Sister    • Thyroid nodules Sister    • Rheum arthritis Sister    • Alpha-1 antitrypsin deficiency Sister         carrier   • Ovarian cancer Mother    • Arthritis Mother    • Ovarian cancer Sister    • Hypothyroidism Sister    • Alpha-1 antitrypsin deficiency Grandchild         carrier   • Heart attack Brother    • Breast cancer Sister      Social History     Socioeconomic History   • Marital status:      Spouse name: Not on file   • Number of children: Not on file   • Years of education: Not on file   • Highest education level: Not on file   Tobacco Use   • Smoking status: Never Smoker   • Smokeless tobacco: Never Used   Vaping Use   • Vaping Use: Never used   Substance and Sexual Activity   • Alcohol use: Never   • Drug use: Never   • Sexual activity: Defer      Current Outpatient Medications on File Prior to Visit   Medication Sig Dispense Refill   • Cholecalciferol (VITAMIN D) 1000 UNITS tablet Take 1,000 Units by mouth Daily.     • Multiple Vitamin (MULTI VITAMIN  "DAILY PO) Take  by mouth daily.     • nabumetone (RELAFEN) 750 MG tablet Take  by mouth 2 (two) times a day.     • psyllium (METAMUCIL) 0.52 G capsule Take 1 capsule by mouth daily as needed.     • tolterodine LA (DETROL LA) 4 MG 24 hr capsule Take 1 capsule by mouth Daily. 90 capsule 3     No current facility-administered medications on file prior to visit.      Allergies   Allergen Reactions   • Erythromycin Other (See Comments)     Chest pain   • Sulfa Antibiotics Rash        Review of Systems   Constitutional: Negative.    HENT: Negative.    Eyes: Negative.    Respiratory: Negative.    Cardiovascular: Negative.    Gastrointestinal: Negative.    Endocrine: Negative.    Genitourinary: Negative.    Musculoskeletal: Positive for arthralgias.   Skin: Negative.    Allergic/Immunologic: Negative.    Neurological: Negative.    Hematological: Negative.    Psychiatric/Behavioral: Negative.         The patient's Review of Systems was personally reviewed and confirmed as accurate.    The following portions of the patient's history were reviewed and updated as appropriate: allergies, current medications, past family history, past medical history, past social history, past surgical history and problem list.    Physical Exam  Blood pressure 151/73, pulse 87, height 160 cm (62.99\"), weight 64.8 kg (142 lb 12.8 oz).    Body mass index is 25.3 kg/m².    GENERAL APPEARANCE: awake, alert & oriented x 3, in no acute distress and well developed, well nourished  PSYCH: normal affect  LUNGS:  breathing nonlabored  EYES: sclera anicteric  CARDIOVASCULAR: palpable dorsalis pedis, palpable posterior tibial bilaterally. Capillary refill less than 2 seconds  EXTREMITIES: no clubbing, cyanosis  GAIT:  Antalgic           Right Hip Exam:  RANGE OF MOTION:   FLEXION CONTRACTURE: None  FLEXION: 110 degrees  INTERNAL ROTATION: 5 degrees at 90 degrees of flexion   EXTERNAL ROTATION: 35 degrees at 90 degrees of flexion    PAIN WITH HIP MOTION: " yes  PAIN WITH LOGROLL: no  STINCHFIELD TEST: positive    KNEE EXAM: full knee ROM (0-120 degrees), stable to varus and valgus stress at terminal extension and 30 degrees flexion     STRENGTH:  4/5 hip adduction, abduction, flexion. 5/5 knee flexion, extension. 5/5 ankle dorsiflexion and plantarflexion.     GREATER TROCHANTER BURSAL PAIN:  yes     REFLEXES:   PATELLAR 2+/4   ACHILLES 2+/4    CLONUS: no  STRAIGHT LEG TEST:   negative    SENSATION TO LIGHT TOUCH:  DEEP PERONEAL/SUPERFICIAL PERONEAL/SURAL/SAPHENOUS/TIBIAL:  intact    EDEMA:   no  ERYTHEMA:  no  WOUNDS/INCISIONS:  no        Left Hip Exam:   RANGE OF MOTION:   FLEXION CONTRACTURE: None   FLEXION: 110 degrees   INTERNAL ROTATION: 5 degrees  at 90 degrees of flexion   EXTERNAL ROTATION: 40 degrees at 90 degrees of flexion    PAIN WITH HIP MOTION: no  PAIN WITH LOGROLL: no  STINCHFIELD TEST: negative    KNEE EXAM: full knee ROM (0-120 degrees), stable to varus and valgus stress at terminal extension and 30 degrees flexion     STRENGTH:  5/5 hip adduction, abduction, flexion. 5/5 strength knee flexion, extension. 5/5 strength ankle dorsiflexion and plantarflexion.     GREATER TROCHANTER BURSAL PAIN:  no     REFLEXES:   PATELLAR 2+/4   ACHILLES 2+/4    CLONUS: negative  STRAIGHT LEG TEST:   negative    SENSATION TO LIGHT TOUCH:  DEEP PERONEAL/SUPERFICIAL PERONEAL/SURAL/SAPHENOUS/TIBIAL:  intact    EDEMA:   no  ERYTHEMA:  no  WOUNDS/INCISIONS: no overlying skin problems.      ------------------------------------------------------------------    LEG LENGTHS:  equal  _____________________________________________________  _____________________________________________________    RADIOGRAPHIC FINDINGS:   Indication: Right hip pain    Comparison: Todays xrays were compared to previous xrays from 5/6/2021    AP pelvis, hip 2 views: Right: advanced, end-stage osteoarthritis with bone on bone articulation, subchondral sclerosis, and subchondral cysts, there are  marginal osteophytes visualized at the femoral head-neck junction and acetabular margins and No significant changes compared to prior radiographs.; Left: advanced, end-stage osteoarthritis with bone on bone articulation, subchondral sclerosis, and subchondral cysts, there are marginal osteophytes visualized at the femoral head-neck junction and acetabular margins and No significant changes compared to prior radiographs.    Assessment/Plan:   Diagnosis Plan   1. Primary osteoarthritis of right hip  Case Request    Pregnancy, Urine - Urine, Clean Catch    CBC and Differential    Basic metabolic panel    Protime-INR    APTT    Hemoglobin A1c    ECG 12 Lead    Nicotine & Metabolite, Quant    Tranexamic Acid 1,000 mg in sodium chloride 0.9 % 100 mL    Tranexamic Acid 1,000 mg in sodium chloride 0.9 % 100 mL    ceFAZolin (ANCEF) 2 g in sodium chloride 0.9 % 100 mL IVPB    acetaminophen (TYLENOL) tablet 975 mg    meloxicam (MOBIC) tablet 15 mg    pregabalin (LYRICA) capsule 75 mg    mupirocin (BACTROBAN) 2 % nasal ointment 1 application    Case Request   2. Right hip pain  XR Pelvis 1 or 2 View     The patient has clinical and radiographic evidence of end-stage right hip joint degeneration. Conservative measures have been tried for 3 months or longer, but have failed to adequately treat or improve the patient's symptoms. Pain is restricting the patient's daily activities as well as quality of life. The recommendation at this time is to proceed with a right total hip arthroplasty with the goal to improve patient function and pain. The risks, benefits, potential complications, and alternatives were discussed with the patient in detail. Risks included but were not limited to bleeding, infection, anesthesia risks, damage to neurovascular structures, osteolysis, aseptic loosening, instability, dislocation, pain, continued pain, iatrogenic fracture, leg length discrepancy, possible need for future surgery including the potential  for amputation, blood clots, myocardial infarction, stroke, and death. Toya-operative blood management and the potential for blood transfusion were discussed with risks and options clearly outlined. Specific details of the surgical procedure, hospitalization, recovery, rehabilitation, and long-term precautions were also presented. Pre-operative teaching was provided. Implant/prosthesis selection was outlined, and the many options available were explained; the final choice will be made at the time of the procedure to match the anatomy and condition of the bone, ligaments, tendons, and muscles. Given this instruction, the patient elected to proceed with the right total hip arthroplasty. The patient will be seen by pre-admission testing for pre-operative optimization and risk assessment and will be scheduled for surgery once this is completed.    The patient is considered standard risk for DVT based on patient risk factors and will be placed on aspirin postoperatively for DVT prophylaxis.      Edwardo Diana MD  05/13/21  12:17 EDT

## 2021-07-22 ENCOUNTER — PRE-ADMISSION TESTING (OUTPATIENT)
Dept: PREADMISSION TESTING | Facility: HOSPITAL | Age: 71
End: 2021-07-22

## 2021-07-22 VITALS — HEIGHT: 63 IN | WEIGHT: 143 LBS | BODY MASS INDEX: 25.34 KG/M2

## 2021-07-22 DIAGNOSIS — M16.11 PRIMARY OSTEOARTHRITIS OF RIGHT HIP: ICD-10-CM

## 2021-07-22 LAB
ANION GAP SERPL CALCULATED.3IONS-SCNC: 11 MMOL/L (ref 5–15)
APTT PPP: 24.1 SECONDS (ref 22–39)
BASOPHILS # BLD AUTO: 0.06 10*3/MM3 (ref 0–0.2)
BASOPHILS NFR BLD AUTO: 1.1 % (ref 0–1.5)
BUN SERPL-MCNC: 18 MG/DL (ref 8–23)
BUN/CREAT SERPL: 28.6 (ref 7–25)
CALCIUM SPEC-SCNC: 9.4 MG/DL (ref 8.6–10.5)
CHLORIDE SERPL-SCNC: 101 MMOL/L (ref 98–107)
CO2 SERPL-SCNC: 25 MMOL/L (ref 22–29)
CREAT SERPL-MCNC: 0.63 MG/DL (ref 0.57–1)
DEPRECATED RDW RBC AUTO: 45.7 FL (ref 37–54)
EOSINOPHIL # BLD AUTO: 0.36 10*3/MM3 (ref 0–0.4)
EOSINOPHIL NFR BLD AUTO: 6.9 % (ref 0.3–6.2)
ERYTHROCYTE [DISTWIDTH] IN BLOOD BY AUTOMATED COUNT: 12.9 % (ref 12.3–15.4)
GFR SERPL CREATININE-BSD FRML MDRD: 93 ML/MIN/1.73
GLUCOSE SERPL-MCNC: 106 MG/DL (ref 65–99)
HBA1C MFR BLD: 6.1 % (ref 4.8–5.6)
HCT VFR BLD AUTO: 37 % (ref 34–46.6)
HGB BLD-MCNC: 12.1 G/DL (ref 12–15.9)
IMM GRANULOCYTES # BLD AUTO: 0.03 10*3/MM3 (ref 0–0.05)
IMM GRANULOCYTES NFR BLD AUTO: 0.6 % (ref 0–0.5)
INR PPP: 0.95 (ref 0.85–1.16)
LYMPHOCYTES # BLD AUTO: 1.26 10*3/MM3 (ref 0.7–3.1)
LYMPHOCYTES NFR BLD AUTO: 24 % (ref 19.6–45.3)
MCH RBC QN AUTO: 31.3 PG (ref 26.6–33)
MCHC RBC AUTO-ENTMCNC: 32.7 G/DL (ref 31.5–35.7)
MCV RBC AUTO: 95.9 FL (ref 79–97)
MONOCYTES # BLD AUTO: 0.48 10*3/MM3 (ref 0.1–0.9)
MONOCYTES NFR BLD AUTO: 9.2 % (ref 5–12)
NEUTROPHILS NFR BLD AUTO: 3.05 10*3/MM3 (ref 1.7–7)
NEUTROPHILS NFR BLD AUTO: 58.2 % (ref 42.7–76)
NRBC BLD AUTO-RTO: 0 /100 WBC (ref 0–0.2)
PLATELET # BLD AUTO: 323 10*3/MM3 (ref 140–450)
PMV BLD AUTO: 9.2 FL (ref 6–12)
POTASSIUM SERPL-SCNC: 4.4 MMOL/L (ref 3.5–5.2)
PROTHROMBIN TIME: 12.4 SECONDS (ref 11.4–14.4)
RBC # BLD AUTO: 3.86 10*6/MM3 (ref 3.77–5.28)
SODIUM SERPL-SCNC: 137 MMOL/L (ref 136–145)
WBC # BLD AUTO: 5.24 10*3/MM3 (ref 3.4–10.8)

## 2021-07-22 PROCEDURE — 85730 THROMBOPLASTIN TIME PARTIAL: CPT

## 2021-07-22 PROCEDURE — 80048 BASIC METABOLIC PNL TOTAL CA: CPT

## 2021-07-22 PROCEDURE — G0480 DRUG TEST DEF 1-7 CLASSES: HCPCS

## 2021-07-22 PROCEDURE — 85025 COMPLETE CBC W/AUTO DIFF WBC: CPT

## 2021-07-22 PROCEDURE — 85610 PROTHROMBIN TIME: CPT

## 2021-07-22 PROCEDURE — 36415 COLL VENOUS BLD VENIPUNCTURE: CPT

## 2021-07-22 PROCEDURE — 83036 HEMOGLOBIN GLYCOSYLATED A1C: CPT

## 2021-07-22 ASSESSMENT — HOOS JR
HOOS JR SCORE: 36.363
HOOS JR SCORE: 17

## 2021-07-22 NOTE — PAT
Prescription for Bactroban and Chlorhexidine shower called into patient's pharmacy or [facility] pharmacy by patient's surgeon.  Reinforced with patient to  the prescription from applicable pharmacy if they haven't already.  Verbal and written instructions given regarding proper use of the Bactroban and Chlorhexidine to patient and/or famlily during PAT visit. Patient/family also instructed to complete checklist and return it to Pre-op on the day of surgery.  Patient and/or family verbalized understanding.     Patient to apply Chlorhexadine wipes  to surgical area (as instructed) the night before procedure and the AM of procedure. Wipes provided.    Discussed with patient options for receiving total joint replacement education and assessed patient's ability and preference. Joint Replacement Guide given to patient during PAT visit since not received a copy within the last year. Encouraged patient/family to read guide thoroughly and notify PAT staff with any questions or concerns. Handout provided directing patient to links to watch online videos related to joint replacement surgery on the [facility] website. The handout gives detailed instructions for joining an online joint replacement class through Zoom or phone conference offered on Thursdays. Patient agreed to participate by watching videos online. Patient verbalized understanding of instructions and to complete the online learning tool survey. Encouraged to share information with family and/or . An overview of the joint replacement education was provided during the visit including general perioperative instructions that are routine for all surgical patients (PAT PASS, wipes, directions to pre-op, etc.).

## 2021-07-28 LAB
COTININE SERPL-MCNC: <1 NG/ML
NICOTINE SERPL-MCNC: <1 NG/ML

## 2021-07-30 ENCOUNTER — APPOINTMENT (OUTPATIENT)
Dept: PREADMISSION TESTING | Facility: HOSPITAL | Age: 71
End: 2021-07-30

## 2021-07-30 PROBLEM — M16.11 PRIMARY OSTEOARTHRITIS OF RIGHT HIP: Status: ACTIVE | Noted: 2021-05-13

## 2021-07-30 LAB — SARS-COV-2 RNA PNL SPEC NAA+PROBE: NOT DETECTED

## 2021-07-30 PROCEDURE — C9803 HOPD COVID-19 SPEC COLLECT: HCPCS

## 2021-07-30 PROCEDURE — U0004 COV-19 TEST NON-CDC HGH THRU: HCPCS

## 2021-08-01 ENCOUNTER — ANESTHESIA EVENT (OUTPATIENT)
Dept: PERIOP | Facility: HOSPITAL | Age: 71
End: 2021-08-01

## 2021-08-01 RX ORDER — FAMOTIDINE 10 MG/ML
20 INJECTION, SOLUTION INTRAVENOUS ONCE
Status: CANCELLED | OUTPATIENT
Start: 2021-08-01 | End: 2021-08-01

## 2021-08-02 ENCOUNTER — HOSPITAL ENCOUNTER (OUTPATIENT)
Facility: HOSPITAL | Age: 71
Discharge: HOME | End: 2021-08-03
Attending: ORTHOPAEDIC SURGERY | Admitting: ORTHOPAEDIC SURGERY

## 2021-08-02 ENCOUNTER — APPOINTMENT (OUTPATIENT)
Dept: GENERAL RADIOLOGY | Facility: HOSPITAL | Age: 71
End: 2021-08-02

## 2021-08-02 ENCOUNTER — ANESTHESIA (OUTPATIENT)
Dept: PERIOP | Facility: HOSPITAL | Age: 71
End: 2021-08-02

## 2021-08-02 DIAGNOSIS — M16.11 PRIMARY OSTEOARTHRITIS OF RIGHT HIP: ICD-10-CM

## 2021-08-02 DIAGNOSIS — Z96.641 STATUS POST TOTAL REPLACEMENT OF RIGHT HIP: Primary | ICD-10-CM

## 2021-08-02 PROBLEM — M25.551 RIGHT HIP PAIN: Status: ACTIVE | Noted: 2021-08-02

## 2021-08-02 LAB — GLUCOSE BLDC GLUCOMTR-MCNC: 91 MG/DL (ref 70–130)

## 2021-08-02 PROCEDURE — 25010000002 ONDANSETRON PER 1 MG: Performed by: NURSE ANESTHETIST, CERTIFIED REGISTERED

## 2021-08-02 PROCEDURE — 25010000003 CEFAZOLIN IN DEXTROSE 2-4 GM/100ML-% SOLUTION: Performed by: ORTHOPAEDIC SURGERY

## 2021-08-02 PROCEDURE — G0378 HOSPITAL OBSERVATION PER HR: HCPCS

## 2021-08-02 PROCEDURE — 27130 TOTAL HIP ARTHROPLASTY: CPT | Performed by: ORTHOPAEDIC SURGERY

## 2021-08-02 PROCEDURE — A9270 NON-COVERED ITEM OR SERVICE: HCPCS | Performed by: ORTHOPAEDIC SURGERY

## 2021-08-02 PROCEDURE — 72170 X-RAY EXAM OF PELVIS: CPT

## 2021-08-02 PROCEDURE — 25010000002 CEFAZOLIN PER 500 MG: Performed by: ORTHOPAEDIC SURGERY

## 2021-08-02 PROCEDURE — C1776 JOINT DEVICE (IMPLANTABLE): HCPCS | Performed by: ORTHOPAEDIC SURGERY

## 2021-08-02 PROCEDURE — 27130 TOTAL HIP ARTHROPLASTY: CPT | Performed by: PHYSICIAN ASSISTANT

## 2021-08-02 PROCEDURE — 97161 PT EVAL LOW COMPLEX 20 MIN: CPT

## 2021-08-02 PROCEDURE — A9270 NON-COVERED ITEM OR SERVICE: HCPCS | Performed by: ANESTHESIOLOGY

## 2021-08-02 PROCEDURE — 63710000001 MELOXICAM 15 MG TABLET: Performed by: ORTHOPAEDIC SURGERY

## 2021-08-02 PROCEDURE — 82962 GLUCOSE BLOOD TEST: CPT

## 2021-08-02 PROCEDURE — 97116 GAIT TRAINING THERAPY: CPT

## 2021-08-02 PROCEDURE — C1889 IMPLANT/INSERT DEVICE, NOC: HCPCS | Performed by: ORTHOPAEDIC SURGERY

## 2021-08-02 PROCEDURE — 25010000002 ONDANSETRON PER 1 MG: Performed by: ORTHOPAEDIC SURGERY

## 2021-08-02 PROCEDURE — 25010000002 HYDROMORPHONE PER 4 MG: Performed by: NURSE ANESTHETIST, CERTIFIED REGISTERED

## 2021-08-02 PROCEDURE — 94799 UNLISTED PULMONARY SVC/PX: CPT

## 2021-08-02 PROCEDURE — S0260 H&P FOR SURGERY: HCPCS | Performed by: ORTHOPAEDIC SURGERY

## 2021-08-02 PROCEDURE — 25010000002 FENTANYL CITRATE (PF) 50 MCG/ML SOLUTION: Performed by: NURSE ANESTHETIST, CERTIFIED REGISTERED

## 2021-08-02 PROCEDURE — 63710000001 PREGABALIN 75 MG CAPSULE: Performed by: ORTHOPAEDIC SURGERY

## 2021-08-02 PROCEDURE — 63710000001 MUPIROCIN 2 % OINTMENT 1 G TUBE: Performed by: ORTHOPAEDIC SURGERY

## 2021-08-02 PROCEDURE — 25010000002 DEXAMETHASONE PER 1 MG: Performed by: NURSE ANESTHETIST, CERTIFIED REGISTERED

## 2021-08-02 PROCEDURE — 25010000002 NEOSTIGMINE 10 MG/10ML SOLUTION: Performed by: NURSE ANESTHETIST, CERTIFIED REGISTERED

## 2021-08-02 PROCEDURE — 63710000001 FAMOTIDINE 20 MG TABLET: Performed by: ANESTHESIOLOGY

## 2021-08-02 PROCEDURE — 25010000002 KETOROLAC TROMETHAMINE PER 15 MG: Performed by: ORTHOPAEDIC SURGERY

## 2021-08-02 PROCEDURE — 25010000003 LIDOCAINE 1 % SOLUTION: Performed by: NURSE ANESTHETIST, CERTIFIED REGISTERED

## 2021-08-02 PROCEDURE — 25010000002 MORPHINE PER 10 MG: Performed by: ORTHOPAEDIC SURGERY

## 2021-08-02 PROCEDURE — 25010000002 PROPOFOL 10 MG/ML EMULSION: Performed by: NURSE ANESTHETIST, CERTIFIED REGISTERED

## 2021-08-02 PROCEDURE — 25010000002 ROPIVACAINE PER 1 MG: Performed by: ORTHOPAEDIC SURGERY

## 2021-08-02 PROCEDURE — 63710000001 ACETAMINOPHEN 500 MG TABLET: Performed by: ORTHOPAEDIC SURGERY

## 2021-08-02 PROCEDURE — 63710000001 POVIDONE-IODINE 10 % SOLUTION 30 ML BOTTLE: Performed by: ORTHOPAEDIC SURGERY

## 2021-08-02 DEVICE — TOTL HIP HI DEMAND STRYKER: Type: IMPLANTABLE DEVICE | Site: HIP | Status: FUNCTIONAL

## 2021-08-02 DEVICE — DEV CONTRL TISS STRATAFIX SPIRAL PDO BIDIR 1 36X36CM: Type: IMPLANTABLE DEVICE | Site: HIP | Status: FUNCTIONAL

## 2021-08-02 DEVICE — SCRW HEX LP TRIDENT2 6.5X30MM: Type: IMPLANTABLE DEVICE | Site: HIP | Status: FUNCTIONAL

## 2021-08-02 DEVICE — STEM FEM ACCOLADE2 V40 127D 35X105X42 SZ4: Type: IMPLANTABLE DEVICE | Site: HIP | Status: FUNCTIONAL

## 2021-08-02 DEVICE — SHLL TRIDENT2 TRITANIUM C/HL 50MM: Type: IMPLANTABLE DEVICE | Site: HIP | Status: FUNCTIONAL

## 2021-08-02 DEVICE — WAX BONE HEMO AESCULAP 2.5GM: Type: IMPLANTABLE DEVICE | Site: HIP | Status: FUNCTIONAL

## 2021-08-02 DEVICE — DEV CONTRL TISS STRATAFIX SPIRAL PDO BIDIR MO4 36X36CM: Type: IMPLANTABLE DEVICE | Site: HIP | Status: FUNCTIONAL

## 2021-08-02 DEVICE — HD FEM/HIP BIOLOX/DELTA V40 CERAM 36MM: Type: IMPLANTABLE DEVICE | Site: HIP | Status: FUNCTIONAL

## 2021-08-02 DEVICE — INSRT HIP TRIDENT X3 0DEG 36MM SZD STRL: Type: IMPLANTABLE DEVICE | Site: HIP | Status: FUNCTIONAL

## 2021-08-02 DEVICE — SCRW HEX LP TRIDENT2 6.5X35MM: Type: IMPLANTABLE DEVICE | Site: HIP | Status: FUNCTIONAL

## 2021-08-02 RX ORDER — GLYCOPYRROLATE 0.2 MG/ML
INJECTION INTRAMUSCULAR; INTRAVENOUS AS NEEDED
Status: DISCONTINUED | OUTPATIENT
Start: 2021-08-02 | End: 2021-08-02 | Stop reason: SURG

## 2021-08-02 RX ORDER — LABETALOL HYDROCHLORIDE 5 MG/ML
10 INJECTION, SOLUTION INTRAVENOUS EVERY 4 HOURS PRN
Status: DISCONTINUED | OUTPATIENT
Start: 2021-08-02 | End: 2021-08-03 | Stop reason: HOSPADM

## 2021-08-02 RX ORDER — FENTANYL CITRATE 50 UG/ML
50 INJECTION, SOLUTION INTRAMUSCULAR; INTRAVENOUS
Status: DISCONTINUED | OUTPATIENT
Start: 2021-08-02 | End: 2021-08-02

## 2021-08-02 RX ORDER — FENTANYL CITRATE 50 UG/ML
INJECTION, SOLUTION INTRAMUSCULAR; INTRAVENOUS AS NEEDED
Status: DISCONTINUED | OUTPATIENT
Start: 2021-08-02 | End: 2021-08-02 | Stop reason: SURG

## 2021-08-02 RX ORDER — NEOSTIGMINE METHYLSULFATE 1 MG/ML
INJECTION, SOLUTION INTRAVENOUS AS NEEDED
Status: DISCONTINUED | OUTPATIENT
Start: 2021-08-02 | End: 2021-08-02 | Stop reason: SURG

## 2021-08-02 RX ORDER — PROPOFOL 10 MG/ML
VIAL (ML) INTRAVENOUS AS NEEDED
Status: DISCONTINUED | OUTPATIENT
Start: 2021-08-02 | End: 2021-08-02 | Stop reason: SURG

## 2021-08-02 RX ORDER — FAMOTIDINE 20 MG/1
20 TABLET, FILM COATED ORAL ONCE
Status: COMPLETED | OUTPATIENT
Start: 2021-08-02 | End: 2021-08-02

## 2021-08-02 RX ORDER — ONDANSETRON 2 MG/ML
4 INJECTION INTRAMUSCULAR; INTRAVENOUS EVERY 6 HOURS PRN
Status: DISCONTINUED | OUTPATIENT
Start: 2021-08-02 | End: 2021-08-03 | Stop reason: HOSPADM

## 2021-08-02 RX ORDER — DROPERIDOL 2.5 MG/ML
0.62 INJECTION, SOLUTION INTRAMUSCULAR; INTRAVENOUS ONCE AS NEEDED
Status: DISCONTINUED | OUTPATIENT
Start: 2021-08-02 | End: 2021-08-02

## 2021-08-02 RX ORDER — HYDROMORPHONE HYDROCHLORIDE 1 MG/ML
0.5 INJECTION, SOLUTION INTRAMUSCULAR; INTRAVENOUS; SUBCUTANEOUS
Status: DISCONTINUED | OUTPATIENT
Start: 2021-08-02 | End: 2021-08-03 | Stop reason: HOSPADM

## 2021-08-02 RX ORDER — ACETAMINOPHEN 500 MG
1000 TABLET ORAL EVERY 8 HOURS SCHEDULED
Status: DISCONTINUED | OUTPATIENT
Start: 2021-08-02 | End: 2021-08-03 | Stop reason: HOSPADM

## 2021-08-02 RX ORDER — ONDANSETRON 4 MG/1
4 TABLET, FILM COATED ORAL EVERY 6 HOURS PRN
Status: DISCONTINUED | OUTPATIENT
Start: 2021-08-02 | End: 2021-08-03 | Stop reason: HOSPADM

## 2021-08-02 RX ORDER — MIDAZOLAM HYDROCHLORIDE 1 MG/ML
0.5 INJECTION INTRAMUSCULAR; INTRAVENOUS
Status: DISCONTINUED | OUTPATIENT
Start: 2021-08-02 | End: 2021-08-02 | Stop reason: HOSPADM

## 2021-08-02 RX ORDER — DEXAMETHASONE SODIUM PHOSPHATE 4 MG/ML
INJECTION, SOLUTION INTRA-ARTICULAR; INTRALESIONAL; INTRAMUSCULAR; INTRAVENOUS; SOFT TISSUE AS NEEDED
Status: DISCONTINUED | OUTPATIENT
Start: 2021-08-02 | End: 2021-08-02 | Stop reason: SURG

## 2021-08-02 RX ORDER — CEFAZOLIN SODIUM 2 G/100ML
2 INJECTION, SOLUTION INTRAVENOUS ONCE
Status: COMPLETED | OUTPATIENT
Start: 2021-08-02 | End: 2021-08-02

## 2021-08-02 RX ORDER — ASPIRIN 81 MG/1
81 TABLET ORAL EVERY 12 HOURS SCHEDULED
Status: DISCONTINUED | OUTPATIENT
Start: 2021-08-03 | End: 2021-08-03 | Stop reason: HOSPADM

## 2021-08-02 RX ORDER — SODIUM CHLORIDE, SODIUM LACTATE, POTASSIUM CHLORIDE, CALCIUM CHLORIDE 600; 310; 30; 20 MG/100ML; MG/100ML; MG/100ML; MG/100ML
9 INJECTION, SOLUTION INTRAVENOUS CONTINUOUS
Status: DISCONTINUED | OUTPATIENT
Start: 2021-08-02 | End: 2021-08-03 | Stop reason: HOSPADM

## 2021-08-02 RX ORDER — PREGABALIN 75 MG/1
75 CAPSULE ORAL ONCE
Status: COMPLETED | OUTPATIENT
Start: 2021-08-02 | End: 2021-08-02

## 2021-08-02 RX ORDER — NALOXONE HCL 0.4 MG/ML
0.1 VIAL (ML) INJECTION
Status: DISCONTINUED | OUTPATIENT
Start: 2021-08-02 | End: 2021-08-03 | Stop reason: HOSPADM

## 2021-08-02 RX ORDER — TRANEXAMIC ACID 10 MG/ML
1000 INJECTION, SOLUTION INTRAVENOUS ONCE
Status: COMPLETED | OUTPATIENT
Start: 2021-08-02 | End: 2021-08-02

## 2021-08-02 RX ORDER — SODIUM CHLORIDE 9 MG/ML
100 INJECTION, SOLUTION INTRAVENOUS CONTINUOUS
Status: DISCONTINUED | OUTPATIENT
Start: 2021-08-02 | End: 2021-08-03 | Stop reason: HOSPADM

## 2021-08-02 RX ORDER — MELOXICAM 7.5 MG/1
15 TABLET ORAL DAILY
Status: DISCONTINUED | OUTPATIENT
Start: 2021-08-03 | End: 2021-08-03 | Stop reason: HOSPADM

## 2021-08-02 RX ORDER — SODIUM CHLORIDE 0.9 % (FLUSH) 0.9 %
10 SYRINGE (ML) INJECTION EVERY 12 HOURS SCHEDULED
Status: DISCONTINUED | OUTPATIENT
Start: 2021-08-02 | End: 2021-08-02 | Stop reason: HOSPADM

## 2021-08-02 RX ORDER — MAGNESIUM HYDROXIDE 1200 MG/15ML
LIQUID ORAL AS NEEDED
Status: DISCONTINUED | OUTPATIENT
Start: 2021-08-02 | End: 2021-08-02 | Stop reason: HOSPADM

## 2021-08-02 RX ORDER — ROCURONIUM BROMIDE 10 MG/ML
INJECTION, SOLUTION INTRAVENOUS AS NEEDED
Status: DISCONTINUED | OUTPATIENT
Start: 2021-08-02 | End: 2021-08-02 | Stop reason: SURG

## 2021-08-02 RX ORDER — CEFAZOLIN SODIUM 2 G/100ML
2 INJECTION, SOLUTION INTRAVENOUS EVERY 8 HOURS
Status: COMPLETED | OUTPATIENT
Start: 2021-08-02 | End: 2021-08-03

## 2021-08-02 RX ORDER — ACETAMINOPHEN 500 MG
1000 TABLET ORAL ONCE
Status: COMPLETED | OUTPATIENT
Start: 2021-08-02 | End: 2021-08-02

## 2021-08-02 RX ORDER — LIDOCAINE HYDROCHLORIDE 10 MG/ML
INJECTION, SOLUTION INFILTRATION; PERINEURAL AS NEEDED
Status: DISCONTINUED | OUTPATIENT
Start: 2021-08-02 | End: 2021-08-02 | Stop reason: SURG

## 2021-08-02 RX ORDER — MELOXICAM 15 MG/1
15 TABLET ORAL ONCE
Status: COMPLETED | OUTPATIENT
Start: 2021-08-02 | End: 2021-08-02

## 2021-08-02 RX ORDER — LIDOCAINE HYDROCHLORIDE 10 MG/ML
0.5 INJECTION, SOLUTION EPIDURAL; INFILTRATION; INTRACAUDAL; PERINEURAL ONCE AS NEEDED
Status: COMPLETED | OUTPATIENT
Start: 2021-08-02 | End: 2021-08-02

## 2021-08-02 RX ORDER — OXYCODONE HYDROCHLORIDE 5 MG/1
10 TABLET ORAL EVERY 4 HOURS PRN
Status: DISCONTINUED | OUTPATIENT
Start: 2021-08-02 | End: 2021-08-03 | Stop reason: HOSPADM

## 2021-08-02 RX ORDER — OXYCODONE HYDROCHLORIDE 5 MG/1
5 TABLET ORAL EVERY 4 HOURS PRN
Status: DISCONTINUED | OUTPATIENT
Start: 2021-08-02 | End: 2021-08-03 | Stop reason: HOSPADM

## 2021-08-02 RX ORDER — HYDROMORPHONE HYDROCHLORIDE 1 MG/ML
0.5 INJECTION, SOLUTION INTRAMUSCULAR; INTRAVENOUS; SUBCUTANEOUS
Status: DISCONTINUED | OUTPATIENT
Start: 2021-08-02 | End: 2021-08-02

## 2021-08-02 RX ORDER — ONDANSETRON 2 MG/ML
INJECTION INTRAMUSCULAR; INTRAVENOUS AS NEEDED
Status: DISCONTINUED | OUTPATIENT
Start: 2021-08-02 | End: 2021-08-02 | Stop reason: SURG

## 2021-08-02 RX ORDER — LABETALOL HYDROCHLORIDE 5 MG/ML
5 INJECTION, SOLUTION INTRAVENOUS
Status: DISCONTINUED | OUTPATIENT
Start: 2021-08-02 | End: 2021-08-02

## 2021-08-02 RX ORDER — SODIUM CHLORIDE 0.9 % (FLUSH) 0.9 %
10 SYRINGE (ML) INJECTION AS NEEDED
Status: DISCONTINUED | OUTPATIENT
Start: 2021-08-02 | End: 2021-08-02 | Stop reason: HOSPADM

## 2021-08-02 RX ADMIN — ONDANSETRON 4 MG: 2 INJECTION INTRAMUSCULAR; INTRAVENOUS at 22:39

## 2021-08-02 RX ADMIN — NEOSTIGMINE 3 MG: 1 INJECTION INTRAVENOUS at 14:42

## 2021-08-02 RX ADMIN — FENTANYL CITRATE 50 MCG: 0.05 INJECTION, SOLUTION INTRAMUSCULAR; INTRAVENOUS at 15:35

## 2021-08-02 RX ADMIN — PREGABALIN 75 MG: 75 CAPSULE ORAL at 11:12

## 2021-08-02 RX ADMIN — SODIUM CHLORIDE 100 ML/HR: 9 INJECTION, SOLUTION INTRAVENOUS at 18:37

## 2021-08-02 RX ADMIN — LIDOCAINE HYDROCHLORIDE 0.5 ML: 10 INJECTION, SOLUTION EPIDURAL; INFILTRATION; INTRACAUDAL; PERINEURAL at 11:12

## 2021-08-02 RX ADMIN — ACETAMINOPHEN 1000 MG: 500 TABLET, FILM COATED ORAL at 11:12

## 2021-08-02 RX ADMIN — DEXAMETHASONE SODIUM PHOSPHATE 8 MG: 4 INJECTION, SOLUTION INTRA-ARTICULAR; INTRALESIONAL; INTRAMUSCULAR; INTRAVENOUS; SOFT TISSUE at 12:58

## 2021-08-02 RX ADMIN — PROPOFOL 200 MG: 10 INJECTION, EMULSION INTRAVENOUS at 12:48

## 2021-08-02 RX ADMIN — MUPIROCIN 1 APPLICATION: 20 OINTMENT TOPICAL at 11:12

## 2021-08-02 RX ADMIN — CEFAZOLIN SODIUM 2 G: 10 INJECTION, POWDER, FOR SOLUTION INTRAVENOUS at 12:47

## 2021-08-02 RX ADMIN — GLYCOPYRROLATE 0.4 MG: 0.4 INJECTION INTRAMUSCULAR; INTRAVENOUS at 14:42

## 2021-08-02 RX ADMIN — FENTANYL CITRATE 50 MCG: 50 INJECTION, SOLUTION INTRAMUSCULAR; INTRAVENOUS at 15:11

## 2021-08-02 RX ADMIN — CEFAZOLIN 2 G: 10 INJECTION, POWDER, FOR SOLUTION INTRAVENOUS at 21:14

## 2021-08-02 RX ADMIN — LIDOCAINE HYDROCHLORIDE 50 MG: 10 INJECTION, SOLUTION INFILTRATION; PERINEURAL at 12:48

## 2021-08-02 RX ADMIN — FAMOTIDINE 20 MG: 20 TABLET ORAL at 11:12

## 2021-08-02 RX ADMIN — FENTANYL CITRATE 100 MCG: 50 INJECTION, SOLUTION INTRAMUSCULAR; INTRAVENOUS at 12:48

## 2021-08-02 RX ADMIN — MELOXICAM 15 MG: 15 TABLET ORAL at 11:12

## 2021-08-02 RX ADMIN — FENTANYL CITRATE 50 MCG: 0.05 INJECTION, SOLUTION INTRAMUSCULAR; INTRAVENOUS at 15:20

## 2021-08-02 RX ADMIN — HYDROMORPHONE HYDROCHLORIDE 0.5 MG: 1 INJECTION, SOLUTION INTRAMUSCULAR; INTRAVENOUS; SUBCUTANEOUS at 15:45

## 2021-08-02 RX ADMIN — SODIUM CHLORIDE, POTASSIUM CHLORIDE, SODIUM LACTATE AND CALCIUM CHLORIDE 9 ML/HR: 600; 310; 30; 20 INJECTION, SOLUTION INTRAVENOUS at 11:11

## 2021-08-02 RX ADMIN — ROCURONIUM BROMIDE 50 MG: 10 INJECTION, SOLUTION INTRAVENOUS at 12:48

## 2021-08-02 RX ADMIN — TRANEXAMIC ACID 1000 MG: 10 INJECTION, SOLUTION INTRAVENOUS at 14:23

## 2021-08-02 RX ADMIN — SODIUM CHLORIDE, POTASSIUM CHLORIDE, SODIUM LACTATE AND CALCIUM CHLORIDE 9 ML/HR: 600; 310; 30; 20 INJECTION, SOLUTION INTRAVENOUS at 15:10

## 2021-08-02 RX ADMIN — TRANEXAMIC ACID 1000 MG: 10 INJECTION, SOLUTION INTRAVENOUS at 12:59

## 2021-08-02 RX ADMIN — FENTANYL CITRATE 50 MCG: 50 INJECTION, SOLUTION INTRAMUSCULAR; INTRAVENOUS at 14:50

## 2021-08-02 RX ADMIN — ONDANSETRON 4 MG: 2 INJECTION INTRAMUSCULAR; INTRAVENOUS at 14:34

## 2021-08-02 RX ADMIN — ACETAMINOPHEN 1000 MG: 500 TABLET, FILM COATED ORAL at 19:06

## 2021-08-02 NOTE — OP NOTE
OPERATIVE REPORT     DATE OF PROCEDURE: 8/2/2021    SURGEON: Edwardo Diana M.D.     ASSISTANT(S): Circulator: Key Peña RN; Kavita Ramirez RN  Radiology Technologist: Thad Bowles  Scrub Person: Key Puga  Vendor Representative: Benoit Cantu  Nursing Assistant: Mary Gunderson PCT  Assistant: Kirti Tom PA-C  Assistant: Kirti Tom PA-C    Note-PA was utilized during the case to facilitate positioning the patient, exposure, retraction, placement of final components and definitive closure.    PREOPERATIVE DIAGNOSIS: Advanced degenerative joint disease of the right hip secondary to osteoarthritis    POSTOPERATIVE DIAGNOSIS: same     PROCEDURE: Right total Hip Arthroplasty     SURGICAL DETAILS:     APPROACH: Posterior    ANESTHESIA: General plus local periarticular block    PREOPERATIVE ANTIBIOTICS: Ancef 2 g IV    TRANEXAMIC ACID: IV    ESTIMATED BLOOD LOSS: 200 cc     SPECIMENS: None    IMPLANTS:   : [place]  Acetabular component: 50 mm Trident 2   Acetabular screws: 2  Acetabular liner: 0 degree X3   Femoral component: Accolade  degree size 4  Femoral head: 36+0 Biolox ceramic     DRAINS: None    LOCAL INJECTION: 1 cc Toradol 30mg/ml, 4 cc duramorph 2mg/ml, 20 cc 0.5% ropivicaine, 20 cc 0.5% lidocaine with 1:200,000 epinephrine, 15 cc preservative free normal saline     MODIFIER(S): None    COMPLICATIONS: None apparent    INDICATIONS FOR PROCEDURE: This patient has a history of progressive right hip pain and arthritis. The hip pain is severe with activity and has progressed significantly. Non-operative treatment has been attempted, but has not improved or controlled symptoms during normal daily activities. Motion has become limited and rotation severely restricted. X-rays reveal moderate-to-severe eburnation of articular cartilage on the superior weight bearing surface of the hip with circumferential acetabular and femoral neck osteophytes consistent  with advanced hip osteoarthritis. A total hip arthroplasty was recommended at this time. The risks, benefits, alternatives, and potential complications of the arthroplasty surgery were discussed with the patient in detail to include but not limited to infection, bleeding, anesthesia risks, sciatic nerve palsy, instability/dislocation, limb length discrepancy, aseptic loosening, osteolysis, blood clots, continued pain, iatrogenic fracture, myocardial infarction, stroke, and death. Specific details of the procedure, hospitalization, recovery, rehabilitation, and long-term precautions were also provided. Pre-operative teaching was provided. Implant/prosthesis selection was outlined, and the many options available were explained; the final choice will be made at the time of the procedure to match the anatomy and condition of the bone, ligaments, tendons, and muscles. Understanding of all topics was conveyed to me by the patient, and consent was given to proceed with a right total hip arthroplasty. The patient completed preoperative medical optimization and risk assessment, joint arthroplasty education, and MRSA decolonization using a universal decolonization protocol. Perioperative blood management and the potential for blood transfusion were discussed with risks and options clearly outlined.     INTRAOPERATIVE FINDINGS: End-stage osteoarthritis right hip    PROCEDURE: The patient was identified in the preoperative holding area. The operative site was confirmed and marked. A sequential compression device was placed on the nonoperative leg. The risks, benefits, and alternatives to surgery were again confirmed with the patient and the patient wished to proceed. The patient was brought to the operating room and placed on the operating room table in the supine position. A huddle was performed with the patient and all vital surgical team members to confirm the correct operative site, procedure, anesthesia type, and operative  plan with the patient. After anesthesia was performed, the patient was positioned in the lateral decubitus position on the pegboard and secured with the operative side up. An axillary roll was placed in the axilla and all bony prominences and pressure points were checked and padded. A relative leg length assessment was carried out and markers were placed for intraoperative assessment. Intravenous antibiotic prophylaxis was given and confirmed with the anesthesia team.     The operative leg was prepped and draped in the usual sterile fashion. A surgical time out was performed immediately preceding the incision with all personnel in the operating room to again confirm patient identity, the correct operative site and extremity, correct radiographic studies, availability of appropriate surgical equipment and agreement on the planned procedure. A posterolateral approach to the hip was performed through an incision centered over the greater trochanter. The incision was carried through the subcutaneous tissue to the underlying fascia go and gluteus lorenzo fascia, which were incised and split posteriorly over the trochanter in the direction of the fibers. Hemostasis was obtained with electrocautery. The Charnley retractor was placed after carefully palpating the sciatic nerve which was protected throughout the case.     A standard posterior approach to the hip was performed by releasing the piriformis, short external rotators and posterior capsule and reflecting them posteriorly as a rectangular flap. The superior capsule was scarred down; it was released and excised. The labrum was split and the femoral head mobilized. The hip was then flexed, internally rotated and dislocated from the acetabulum without excessive force. Assessment of the femoral head revealed eburnation of the articular cartilage with complete loss of the weight bearing chondral surface. Osteophytes were present as well. Careful measurements were  performed using the center of the femoral head and the lesser trochanter as markers and a femoral neck cut was made according to the preoperative plan.     Attention was then turned to the acetabulum. Retractors were placed circumferentially for wide acetabular exposure. The labrum and osteophytes were debrided from the rim, and the medial wall was identified and the depth of the socket assessed by excising the pulvinar. Bleeders were controlled, especially the area of the obturator artery with the electrocautery. Acetabular reaming was then started with the hemispherical instrument matching the size of the excised femoral head. Sequential reaming of the acetabulum was then performed by increasing size in 2 mm increments.  Reaming was performed line to line. The reamers created an excellent hemispherical bed of bleeding cancellous bone. The cup was impacted into position, targeting 40-45 degrees of abduction and 20-25 degrees of anteversion, with an excellent press-fit. The press-fit was firm, stable, and apically seated. 2 screw(s) were used for additional support of the fixation. Further osteophyte debridement was done around the socket. All impinging soft tissue was removed from the edges of the socket. The polyethylene bearing/liner was then impacted into place and checked for stability.     Attention was then turned to the femur. The leg was positioned so access did not result in soft-tissue injury. The femoral preparation was started with a box osteotome. The medullary cavity of the femur was then entered and opened with hand reamers. Femoral stem broaches were then employed in an incremental fashion up to the final size, targeting 15-20 degrees of anteversion. The final broach was fully seated, had good rotational and axial stability, and was seated at the appropriate height in relation to the greater trochanter and the preoperative plan. Trial reduction was done. Excellent stability and range of motion was  achieved without impingement at any position. The hip was stable in full extension and external rotation as well as in flexion past 90 degrees, 20 degrees adduction and 60-70 degrees of internal rotation. Leg lengths were re-created within millimeters based on the markers and relative measurement. The hip was then dislocated and the trials removed. The wound was copiously irrigated, and the permanent femoral stem was then impacted down in approximately 15-20 degrees of anteversion. The press-fit was firm, and stable to axial and rotational force in all planes. The permanent femoral head was then impacted on the clean trunnion. The socket and wound were irrigated, suctioned, and inspected for debris. The final reduction was performed, and again leg length assessment and stability assessment of the hip were performed to confirm optimal component selection and stability in all planes without impingement when stressed to the extremes.     The wound was irrigated with dilute betadine solution as well as saline, and hemostasis obtained with electrocautery. A pain cocktail was injected into the pericapsular tissues. The posterior capsule, piriformis, and short external rotators were repaired utilizing #2 Ticron through drill holes in the greater trochanter. The sciatic nerve was palpated and found to be intact and the wound was irrigated. Instrument and sponge counts were completed and confirmed correct. The fascia go and gluteal fascia were closed with interrupted #1 Vicryl suture and oversewn with #2 Stratafix. The deep subcutaneous tissue was closed with running #1 Stratafix suture and the superficial subcutaneous tissue with interrupted 2-0 Vicryl suture. A 3-0 monocryl running stitch was used to close skin followed by skin glue adhesive to seal the wound. A silver impregnated dressing was then placed, followed by a sequential compression device to the operative limb, followed by an abduction pillow. The patient was  then returned to a supine position on the operating room table. The patient was sufficiently recovered from anesthesia, transferred to a hospital bed and taken to the PACU in stable condition.     One gram (1000 mg) of intravenous tranexamic acid was administered prior to incision. A second one gram (1000 mg) intravenous dose was given prior to wound closure.    No apparent complications occurred during the procedure Instrument, sponge and needle counts were correct x 2.     The patient underwent risk stratification preoperatively and aspirin was chosen for DVT prophylaxis. Delay in starting chemical prophylaxis for 23 hours from surgical incision was over concerns for hematoma formation and wound related issues.     POST OPERATIVE PLAN:   Protected weight bearing as tolerated   Posterior hip precautions x 6 weeks   PT/OT for mobilization and medical equipment needs   23 hours perioperative antibiotic prophylaxis   Pain control with PO/IV meds   Keep silver dressing in place for 7 days post op. Change dressing only if saturated.   SCD's to bilateral lower extremities   Social work for discharge planning needs   Follow up in 3 weeks for post operative wound check with XR AP pelvis.

## 2021-08-02 NOTE — THERAPY EVALUATION
Patient Name: Natalia Estrada  : 1950    MRN: [id number]                              Today's Date: 2021       Admit Date: 2021    Visit Dx:     ICD-10-CM ICD-9-CM   1. Primary osteoarthritis of right hip  M16.11 715.15     Patient Active Problem List   Diagnosis   • Allergic rhinitis   • Abnormal finding on thyroid function test   • Digital mucinous cyst   • Fibrocystic breast changes   • Heart murmur   • Hyperlipidemia   • Impaired fasting glucose   • Breast pain   • Menopause   • Osteoarthritis of both hips   • Rash   • Osteopenia   • Diverticulosis of colon   • Medicare annual wellness visit, subsequent   • Urge incontinence of urine   • Right SNHL   • GERD (gastroesophageal reflux disease)   • Hyperplastic polyp of descending colon   • Alpha-1-antitrypsin deficiency carrier   • Primary osteoarthritis of right hip   • Right hip pain   • Status post total replacement of right hip     Past Medical History:   Diagnosis Date   • Abdominal pain, left lower quadrant 2016    Impression: 3/2/17 normal abd CT (except mild interstitial scarring lung bases); 2014 - ddx includes colon spasm, diverticulitis, less likely reflux, gastritis, pancreatitis, GYN etiology (h/o YULIYA/BSO but note (+)FH ovarian CA); with exam findings and hx, proceed with trial of SL levsin; if no better, plan to check labs and CT for further evaluation; note last colonoscopy done  (to be re   • Arthritis    • Diarrhea 2016    Impression: 2014 - reassuring that sxs come/go; discontinue stool softener until resolution of sxs; monitor for food triggers; add fiber supplement 1x/day;  consider addition of probiotic but rec 30d trial if she proceeds; f/u if sxs do not resolve;    • Hx of AAA ultrasound 2014    neg AAA ultrasound (14)   • Hx of bone density study 2014    DEXA (3/7/14): L -1.4, H -1.6   • Hx of bone density study 2017    DEXA (3/16/17) L -1.7, H -1.3, repeat 3 yrs   • Hx of bone  density study 07/31/2020    DEXA (7/31/20): L -1.6, H -1.3, repeat 3 yrs   • Hx of colonoscopy 04/18/2016    colonosc (4/18/16): hyperplastic polyp, diverticulosis, repeat 10 yrs; GI - Dr. Knight   • Hx of echocardiogram 03/16/2014    ECHO (2/17): mild concentric LVH and diastolic dysfunction, mild MR/TR/AR   • Hx of pelvic ultrasound 02/05/2020    nl pelvic u/s (2/5/20); ovaries absent   • Hx of thyroid ultrasound 01/30/2018    nl thyr u/s (1/30/18)   • OAB (overactive bladder)      Past Surgical History:   Procedure Laterality Date   • BILATERAL OOPHORECTOMY  02/2003    due to (+)FH ovarian CA; GYN - Dr. Resendiz   • CATARACT EXTRACTION Bilateral    • COLONOSCOPY     • CYST REMOVAL      toe, right middle    • INCISIONAL BREAST BIOPSY      x2 (5/71 and 12/81)surg - Dr. Dawson     General Information     Row Name 08/02/21 1700          Physical Therapy Time and Intention    Document Type  evaluation  -LR     Mode of Treatment  physical therapy;individual therapy  -LR     Row Name 08/02/21 1700          General Information    Patient Profile Reviewed  yes  -LR     Prior Level of Function  min assist:;all household mobility;community mobility;gait;transfer;bed mobility;ADL's;home management;cooking;cleaning;using stairs;independent:;driving;shopping all mobility limited by pain  -LR     Existing Precautions/Restrictions  fall;right;hip, posterior  -LR     Barriers to Rehab  previous functional deficit  -LR     Row Name 08/02/21 1700          Living Environment    Lives With  spouse can assist at all times upon d/c home  -LR     Row Name 08/02/21 1700          Home Main Entrance    Number of Stairs, Main Entrance  two 2 separate steps  -LR     Stair Railings, Main Entrance  none  -LR     Row Name 08/02/21 1700          Stairs Within Home, Primary    Number of Stairs, Within Home, Primary  none  -LR     Stairs Comment, Within Home, Primary  1 story home  -LR     Row Name 08/02/21 1700          Cognition     Orientation Status (Cognition)  oriented x 4  -LR     Row Name 08/02/21 1700          Safety Issues, Functional Mobility    Safety Issues Affecting Function (Mobility)  positioning of assistive device;insight into deficits/self-awareness;judgment;sequencing abilities;safety precautions follow-through/compliance;safety precaution awareness  -LR     Impairments Affecting Function (Mobility)  balance;strength;pain;endurance/activity tolerance;range of motion (ROM)  -LR       User Key  (r) = Recorded By, (t) = Taken By, (c) = Cosigned By    Initials Name Provider Type    LR Catherine Jain, PT Physical Therapist        Mobility     Row Name 08/02/21 1700          Bed Mobility    Bed Mobility  supine-sit  -LR     Supine-Sit [place] (Bed Mobility)  verbal cues;contact guard  -LR     Assistive Device (Bed Mobility)  head of bed elevated;bed rails  -LR     Comment (Bed Mobility)  Verbal cues to bridge with L LE to move hips towards EOB. Cues to push up from bed to raise trunk into sitting and to scoot hips out to get feet on floor. CGA to support R LE. Cues to avoid flexing past 90 degrees during transitionn to EOB. C/o mild dizziness upon sitting up. Improved with rest.  -LR     Row Name 08/02/21 1700          Transfers    Comment (Transfers)  Verbal cues to push up from bed to stand and to reach back for chair to lower into sitting. Verbal cues to step R LE out before t/f for comfort and to avoid flexing past 90 degrees during t/f.  -LR     Row Name 08/02/21 1700          Bed-Chair Transfer    Bed-Chair [place] (Transfers)  not tested  -LR     Row Name 08/02/21 1700          Sit-Stand Transfer    Sit-Stand [place] (Transfers)  verbal cues;contact guard;2 person assist  -LR     Assistive Device (Sit-Stand Transfers)  walker, front-wheeled  -LR     Row Name 08/02/21 1700          Gait/Stairs (Locomotion)    [place] Level (Gait)  verbal cues;contact guard;2 person assist  -LR     Assistive Device  (Gait)  walker, front-wheeled  -LR     Distance in Feet (Gait)  100  -LR     Deviations/Abnormal Patterns (Gait)  bilateral deviations;latrice decreased;gait speed decreased;stride length decreased;right sided deviations;antalgic  -LR     Bilateral Gait Deviations  forward flexed posture;heel strike decreased  -LR     Right Sided Gait Deviations  weight shift ability decreased  -LR     [place] Level (Stairs)  not tested  -LR     Comment (Gait/Stairs)  Patient ambulated with step to gait pattern at slow pace. Verbal cues for correct sequencing of steps, increased R LE weight bearing/stance phase, decreased UE weight bearing, and increased R hip and knee flexion during swing phase. Improved with cues for correction, able to progress to step through gait pattern. Gait limited by fatigue and weakness.  -LR     Row Name 08/02/21 1700          Mobility    Extremity Weight-bearing Status  right lower extremity  -LR     Right Lower Extremity (Weight-bearing Status)  weight-bearing as tolerated (WBAT)  -LR       User Key  (r) = Recorded By, (t) = Taken By, (c) = Cosigned By    Initials Name Provider Type    LR Catherine Jain, PT Physical Therapist        Obj/Interventions     Row Name 08/02/21 1700          Range of Motion Comprehensive    General Range of Motion  lower extremity range of motion deficits identified  -LR     Row Name 08/02/21 1700          Strength Comprehensive (MMT)    General Manual Muscle Testing (MMT) Assessment  lower extremity strength deficits identified  -LR     Row Name 08/02/21 1700          Motor Skills    Therapeutic Exercise  ankle;knee;hip;other (see comments) cues for technique; able to actively DF bilaterally  -LR     Row Name 08/02/21 1700          Hip (Therapeutic Exercise)    Hip (Therapeutic Exercise)  isometric exercises  -LR     Hip Isometrics (Therapeutic Exercise)  bilateral;gluteal sets;supine;10 repetitions  -LR     Row Name 08/02/21 1700          Knee (Therapeutic  Exercise)    Knee (Therapeutic Exercise)  isometric exercises  -LR     Knee Isometrics (Therapeutic Exercise)  right;quad sets;supine;10 repetitions  -LR     Row Name 08/02/21 1700          Ankle (Therapeutic Exercise)    Ankle (Therapeutic Exercise)  AROM (active range of motion)  -LR     Ankle AROM (Therapeutic Exercise)  bilateral;dorsiflexion;plantarflexion;supine;10 repetitions  -LR     Row Name 08/02/21 1700          Balance    Balance Assessment  sitting static balance;sitting dynamic balance;standing static balance;standing dynamic balance  -LR     Static Sitting Balance  WFL;unsupported;sitting, edge of bed  -LR     Dynamic Sitting Balance  WFL;unsupported;sitting, edge of bed  -LR     Static Standing Balance  WFL;supported;standing  -LR     Dynamic Standing Balance  mild impairment;standing  -LR     Row Name 08/02/21 1700          Sensory Assessment (Somatosensory)    Sensory Assessment (Somatosensory)  sensation intact;other (see comments) denies numbness/tingling; light touch equal and intact  -     Row Name 08/02/21 1700          General Lower Extremity Assessment (Range of Motion)    Lower Extremity: Range of Motion  LLE ROM WFL  -LR     Comment: Lower Extremity ROM  R hip AROM impaired 25%  -LR     Row Name 08/02/21 1700          Lower Extremity (Manual Muscle Testing)    Lower Extremity: Manual Muscle Testing (MMT)  left LE strength is WFL;right hip strength deficit  -LR     Comment, MMT: Lower Extremity  R hip functionally 4-/5, unable to perform SLR independently, no knee buckling with weight bearing  -LR       User Key  (r) = Recorded By, (t) = Taken By, (c) = Cosigned By    Initials Name Provider Type    LR Catherine Jain, PT Physical Therapist        Goals/Plan     Row Name 08/02/21 1700          Bed Mobility Goal 1 (PT)    Activity/Assistive Device (Bed Mobility Goal 1, PT)  sit to supine/supine to sit  -LR     [place] Level/Cues Needed (Bed Mobility Goal 1, PT)  modified  independence  -LR     Time Frame (Bed Mobility Goal 1, PT)  long term goal (LTG);3 days  -LR     Progress/Outcomes (Bed Mobility Goal 1, PT)  goal ongoing  -LR     Row Name 08/02/21 1700          Transfer Goal 1 (PT)    Activity/Assistive Device (Transfer Goal 1, PT)  sit-to-stand/stand-to-sit;walker, rolling  -LR     [place] Level/Cues Needed (Transfer Goal 1, PT)  modified independence  -LR     Time Frame (Transfer Goal 1, PT)  long term goal (LTG);3 days  -LR     Progress/Outcome (Transfer Goal 1, PT)  goal ongoing  -LR     Row Name 08/02/21 1700          Gait Training Goal 1 (PT)    Activity/Assistive Device (Gait Training Goal 1, PT)  gait (walking locomotion);walker, rolling  -LR     [place] Level (Gait Training Goal 1, PT)  modified independence  -LR     Distance (Gait Training Goal 1, PT)  150 feet  -LR     Time Frame (Gait Training Goal 1, PT)  long term goal (LTG);3 days  -LR     Progress/Outcome (Gait Training Goal 1, PT)  goal ongoing  -LR     Row Name 08/02/21 1700          Stairs Goal 1 (PT)    Activity/Assistive Device (Stairs Goal 1, PT)  ascending stairs;descending stairs;step-to-step;walker, rolling  -LR     [place] Level/Cues Needed (Stairs Goal 1, PT)  contact guard assist  -LR     Number of Stairs (Stairs Goal 1, PT)  1  -LR     Time Frame (Stairs Goal 1, PT)  long term goal (LTG);3 days  -LR     Progress/Outcome (Stairs Goal 1, PT)  goal ongoing  -LR       User Key  (r) = Recorded By, (t) = Taken By, (c) = Cosigned By    Initials Name Provider Type    LR Catherine Jain, PT Physical Therapist        Clinical Impression     Row Name 08/02/21 1700          Pain    Additional Documentation  Pain Scale: Numbers Pre/Post-Treatment (Group)  -LR     Row Name 08/02/21 1700          Pain Scale: Numbers Pre/Post-Treatment    Pretreatment Pain Rating  2/10  -LR     Posttreatment Pain Rating  4/10  -LR     Pain Location - Side  Right  -LR     Pain Location  hip  -LR     Pain  Intervention(s)  Ambulation/increased activity;Repositioned  -LR     Row Name 08/02/21 1700          Plan of Care Review    Plan of Care Reviewed With  patient;spouse  -LR     Progress  improving  -LR     Outcome Summary  Patient ambulated 100 feet with RW and step through gait pattern, limited by fatigue and weakness. Plan is d/c home with family and HHPT. Encouraged patient to ambulate again with nursing later this PM. Will continue to progress as able.  -LR     Row Name 08/02/21 1700          Therapy Assessment/Plan (PT)    Patient/Family Therapy Goals Statement (PT)  go home, decrease pain  -LR     Rehab Potential (PT)  good, to achieve stated therapy goals  -LR     Criteria for Skilled Interventions Met (PT)  yes;meets criteria;skilled treatment is necessary  -LR     Row Name 08/02/21 1700          Positioning and Restraints    Pre-Treatment Position  in bed  -LR     Post Treatment Position  chair  -LR     In Chair  notified nsg;reclined;sitting;call light within reach;encouraged to call for assist;exit alarm on;with family/caregiver;compression device;legs elevated;pillow between legs  -LR       User Key  (r) = Recorded By, (t) = Taken By, (c) = Cosigned By    Initials Name Provider Type    LR Catherine Jain, PT Physical Therapist        Outcome Measures     Row Name 08/02/21 1700          How much help from another person do you currently need...    Turning from your back to your side while in flat bed without using bedrails?  3  -LR     Moving from lying on back to sitting on the side of a flat bed without bedrails?  3  -LR     Moving to and from a bed to a chair (including a wheelchair)?  3  -LR     Standing up from a chair using your arms (e.g., wheelchair, bedside chair)?  3  -LR     Climbing 3-5 steps with a railing?  3  -LR     To walk in hospital room?  3  -LR     AM-PAC 6 Clicks Score (PT)  18  -LR     Row Name 08/02/21 1700          PADD    Diagnosis  2  -LR     Gender  1  -LR     Age Group   1  -LR     Gait Distance  0  -LR     Assist Level  1  -LR     Home Support  3  -LR     PADD Score  8  -LR     Patient Preference  home with home health  -LR     Prediction by PADD Score  directly home (with home health or out-patient rehab)  -LR     Row Name 08/02/21 1700          Functional Assessment    Outcome Measure Options  AM-PAC 6 Clicks Basic Mobility (PT);PADD  -LR       User Key  (r) = Recorded By, (t) = Taken By, (c) = Cosigned By    Initials Name Provider Type    Catherine Torres, PT Physical Therapist                       Physical Therapy Education                 Title: PT OT SLP Therapies (Done)     Topic: Physical Therapy (Done)     Point: Mobility training (Done)     Learning Progress Summary           Patient Acceptance, E,D, VU,NR by LR at 8/2/2021 1700    Comment: Educated on posterior hip precautions, weight bearing status, correct supine to sit t/f technique, correct sit <->stand t/f technique, correct gait mechanics, and progression of POC.   Significant Other Acceptance, E,D, VU,NR by LR at 8/2/2021 1700    Comment: Educated on posterior hip precautions, weight bearing status, correct supine to sit t/f technique, correct sit <->stand t/f technique, correct gait mechanics, and progression of POC.                   Point: Home exercise program (Done)     Learning Progress Summary           Patient Acceptance, E,D, VU,NR by LR at 8/2/2021 1700    Comment: Educated on posterior hip precautions, weight bearing status, correct supine to sit t/f technique, correct sit <->stand t/f technique, correct gait mechanics, and progression of POC.   Significant Other Acceptance, E,D, VU,NR by LR at 8/2/2021 1700    Comment: Educated on posterior hip precautions, weight bearing status, correct supine to sit t/f technique, correct sit <->stand t/f technique, correct gait mechanics, and progression of POC.                   Point: Body mechanics (Done)     Learning Progress Summary           Patient  Acceptance, E,D, VU,NR by LR at 8/2/2021 1700    Comment: Educated on posterior hip precautions, weight bearing status, correct supine to sit t/f technique, correct sit <->stand t/f technique, correct gait mechanics, and progression of POC.   Significant Other Acceptance, E,D, VU,NR by LR at 8/2/2021 1700    Comment: Educated on posterior hip precautions, weight bearing status, correct supine to sit t/f technique, correct sit <->stand t/f technique, correct gait mechanics, and progression of POC.                   Point: Precautions (Done)     Learning Progress Summary           Patient Acceptance, E,D, VU,NR by LR at 8/2/2021 1700    Comment: Educated on posterior hip precautions, weight bearing status, correct supine to sit t/f technique, correct sit <->stand t/f technique, correct gait mechanics, and progression of POC.   Significant Other Acceptance, E,D, VU,NR by LR at 8/2/2021 1700    Comment: Educated on posterior hip precautions, weight bearing status, correct supine to sit t/f technique, correct sit <->stand t/f technique, correct gait mechanics, and progression of POC.                               User Key     Initials Effective Dates Name Provider Type Discipline    LR 06/16/21 -  Catherine Jain, PT Physical Therapist PT              PT Recommendation and Plan  Planned Therapy Interventions (PT): balance training, bed mobility training, gait training, home exercise program, patient/family education, ROM (range of motion), strengthening, stair training, transfer training  Plan of Care Reviewed With: patient, spouse  Progress: improving  Outcome Summary: Patient ambulated 100 feet with RW and step through gait pattern, limited by fatigue and weakness. Plan is d/c home with family and HHPT. Encouraged patient to ambulate again with nursing later this PM. Will continue to progress as able.     Time Calculation:   PT Charges     Row Name 08/02/21 1700             Time Calculation    Start Time  1700   -LR      PT Received On  08/02/21  -LR      PT Goal Re-Cert Due Date  08/12/21  -LR         Timed Charges    12090 - PT Therapeutic Exercise Minutes  2  -LR      13882 - Gait Training Minutes   6  -LR         Untimed Charges    PT Eval/Re-eval Minutes  31  -LR         Total Minutes    Timed Charges Total Minutes  8  -LR      Untimed Charges Total Minutes  31  -LR       Total Minutes  39  -LR        User Key  (r) = Recorded By, (t) = Taken By, (c) = Cosigned By    Initials Name Provider Type    LR Catherine Jain, PT Physical Therapist        Therapy Charges for Today     Code Description Service Date Service Provider Modifiers Qty    66455556382 HC GAIT TRAINING EA 15 MIN 8/2/2021 Catherine Jain, PT GP 1    89716780036 HC PT THER SUPP EA 15 MIN 8/2/2021 Catherine Jain, PT GP 3    98523647029 HC PT EVAL LOW COMPLEXITY 3 8/2/2021 Catherine Jain, PT GP 1          PT G-Codes  Outcome Measure Options: AM-PAC 6 Clicks Basic Mobility (PT), PADD  AM-PAC 6 Clicks Score (PT): 18    Catherine Jain, PT  8/2/2021

## 2021-08-02 NOTE — ANESTHESIA PREPROCEDURE EVALUATION
Anesthesia Evaluation                  Airway   Mallampati: I  TM distance: >3 FB  Neck ROM: full  No difficulty expected  Dental      Pulmonary    Cardiovascular     ECG reviewed    (+) valvular problems/murmurs AI, MR and TI, hyperlipidemia,     ROS comment: All valve dz mild    Neuro/Psych  GI/Hepatic/Renal/Endo    (+)  GERD,      Musculoskeletal     Abdominal    Substance History      OB/GYN          Other                        Anesthesia Plan    ASA 2     intravenous induction     Anesthetic plan, all risks, benefits, and alternatives have been provided, discussed and informed consent has been obtained with: patient.    Plan discussed with CRNA.

## 2021-08-02 NOTE — H&P
Pre-Op H&P  Natalia Estrada  [id number]  1950      Chief complaint: Right hip pain      Subjective:  Patient is a 70 y.o.female presents for scheduled surgery by Dr. Diana. She anticipates a RIGHT TOTAL HIP ARTHROPLASTY today. Her hip has been painful for 15 years. She denies use of assistive device for ambulation or recent falls. Conservative treatments failed to provide lasting benefits.      Review of Systems:  Constitutional-- No fever, chills or sweats. No fatigue.  CV-- No chest pain, palpitation or syncope  Resp-- No SOB, cough, hemoptysis  Skin--No rashes or lesions      Allergies:   Allergies   Allergen Reactions   • Erythromycin Other (See Comments)     Chest pain   • Sulfa Antibiotics Rash         Home Meds:  Medications Prior to Admission   Medication Sig Dispense Refill Last Dose   • Chlorhexidine Gluconate 4 % solution Shower daily with hibiclens solution as directed 5 days prior to surgery. 237 mL 0 8/1/2021 at Unknown time   • Cholecalciferol (VITAMIN D) 1000 UNITS tablet Take 1,000 Units by mouth Daily.   8/1/2021 at 2100   • Multiple Vitamin (MULTI VITAMIN DAILY PO) Take 1 tablet by mouth Daily.   8/1/2021 at 2100   • mupirocin (BACTROBAN) 2 % ointment Apply pea-sized amount to each nostril twice daily for 5 days prior to surgery 22 g 0 8/1/2021 at 2100   • psyllium (METAMUCIL) 0.52 G capsule Take 1 capsule by mouth Daily As Needed for Constipation.   8/1/2021 at 2100   • mupirocin (BACTROBAN) 2 % ointment Apply pea-sized amount to each nostril twice daily for 5 days prior to surgery 22 g 0    • nabumetone (RELAFEN) 750 MG tablet Take 750 mg by mouth 2 (Two) Times a Day.   7/29/2021   • tolterodine LA (DETROL LA) 4 MG 24 hr capsule Take 1 capsule by mouth Daily. (Patient taking differently: Take 4 mg by mouth Daily As Needed (over active bladder, only takes when traveling).) 90 capsule 3 7/31/2021         PMH:   Past Medical History:   Diagnosis Date   • Abdominal pain, left lower quadrant  4/29/2016    Impression: 3/2/17 normal abd CT (except mild interstitial scarring lung bases); 11/14/2014 - ddx includes colon spasm, diverticulitis, less likely reflux, gastritis, pancreatitis, GYN etiology (h/o YULIYA/BSO but note (+)FH ovarian CA); with exam findings and hx, proceed with trial of SL levsin; if no better, plan to check labs and CT for further evaluation; note last colonoscopy done 2010 (to be re   • Arthritis    • Diarrhea 04/29/2016    Impression: 05/22/2014 - reassuring that sxs come/go; discontinue stool softener until resolution of sxs; monitor for food triggers; add fiber supplement 1x/day;  consider addition of probiotic but rec 30d trial if she proceeds; f/u if sxs do not resolve;    • Hx of AAA ultrasound 02/28/2014    neg AAA ultrasound (2/28/14)   • Hx of bone density study 03/07/2014    DEXA (3/7/14): L -1.4, H -1.6   • Hx of bone density study 03/16/2017    DEXA (3/16/17) L -1.7, H -1.3, repeat 3 yrs   • Hx of bone density study 07/31/2020    DEXA (7/31/20): L -1.6, H -1.3, repeat 3 yrs   • Hx of colonoscopy 04/18/2016    colonosc (4/18/16): hyperplastic polyp, diverticulosis, repeat 10 yrs; GI - Dr. Knight   • Hx of echocardiogram 03/16/2014    ECHO (2/17): mild concentric LVH and diastolic dysfunction, mild MR/TR/AR   • Hx of pelvic ultrasound 02/05/2020    nl pelvic u/s (2/5/20); ovaries absent   • Hx of thyroid ultrasound 01/30/2018    nl thyr u/s (1/30/18)   • OAB (overactive bladder)      PSH:    Past Surgical History:   Procedure Laterality Date   • BILATERAL OOPHORECTOMY  02/2003    due to (+)FH ovarian CA; GYN - Dr. Resendiz   • CATARACT EXTRACTION Bilateral    • COLONOSCOPY     • CYST REMOVAL      toe, right middle    • INCISIONAL BREAST BIOPSY      x2 (5/71 and 12/81)surg - Dr. Dawson       Immunization History:  Influenza: 2020  Pneumococcal: UTD  Tetanus: UTD  Covid x2 : 2021    Social History:   Tobacco:   Social History     Tobacco Use   Smoking Status Never Smoker  "  Smokeless Tobacco Never Used      Alcohol:     Social History     Substance and Sexual Activity   Alcohol Use Never         Physical Exam:/68 (BP Location: Left arm, Patient Position: Lying)   Pulse 72   Temp 97.8 °F (36.6 °C) (Temporal)   Resp 18   Ht 160 cm (63\")   Wt 64.9 kg (143 lb)   SpO2 97%   BMI 25.33 kg/m²       General Appearance:    Alert, cooperative, no distress, appears stated age   Head:    Normocephalic, without obvious abnormality, atraumatic   Lungs:     Clear to auscultation bilaterally, respirations unlabored    Heart:   Regular rate and rhythm, S1 and S2 normal    Abdomen:    Soft without tenderness   Extremities:   Extremities normal, atraumatic, no cyanosis or edema   Skin:   Skin color, texture, turgor normal, no rashes or lesions   Neurologic:   Grossly intact     Results Review:     LABS:  Lab Results   Component Value Date    WBC 5.24 07/22/2021    HGB 12.1 07/22/2021    HCT 37.0 07/22/2021    MCV 95.9 07/22/2021     07/22/2021    NEUTROABS 3.05 07/22/2021    GLUCOSE 106 (H) 07/22/2021    BUN 18 07/22/2021    CREATININE 0.63 07/22/2021    EGFRIFNONA 93 07/22/2021     07/22/2021    K 4.4 07/22/2021     07/22/2021    CO2 25.0 07/22/2021    CALCIUM 9.4 07/22/2021    ALBUMIN 4.50 03/01/2021    AST 20 03/01/2021    ALT 15 03/01/2021    BILITOT 0.4 03/01/2021       RADIOLOGY:  Imaging Results (Last 72 Hours)     ** No results found for the last 72 hours. **          I reviewed the patient's new clinical results.    Cancer Staging (if applicable)  Cancer Patient: __ yes __no __unknown; If yes, clinical stage T:__ N:__M:__, stage group or __N/A      Impression: Primary osteoarthritis right hip      Plan: RIGHT TOTAL HIP ARTHROPLASTY      Carlie Ramon, JOSE   8/2/2021   11:21 EDT  "

## 2021-08-02 NOTE — DISCHARGE INSTRUCTIONS
"DISCHARGE INSTRUCTIONS   Dr. Diana     Total Hip Replacement/Hip Hemiarthroplasty     Wound Care   1) Keep wound / incision area clean and dry.   2) Dressing to remain in place until post-operative day 7. Upon dressing removal, assess for wound drainage. If no drainage is present, keep wound / incision area open to air as much as possible. If drainage is present, place sterile dressing to cover wound and assess daily. If drainage continues to occur after post-operative day 14, call the office for an urgent appointment. (You should be seen in the clinic within 1-2 days of calling). DO NOT REMOVE SUTURES (IF PRESENT) UNDER ANY CIRCUMSTANCES PRIOR TO FOLLOW UP APPOINTMENT.  3) No baths or swimming until otherwise instructed. The wound must remain dry for 10 days after surgery. After 10 days, you may begin to shower only if no drainage is present. No submerging the wound under standing water until cleared by your physician (no baths, hot tubs, swimming pools, etc). Sponge baths are the best way to perform personal hygiene while at the same time protecting the wound from moisture.   4) Prior to showering, the wound must remain dry for 72 consecutive hours (no drainage whatsoever) prior to showering. If the wound drains or spots, the clock \"resets\" - make sure the wound has been drainage-free for 72 consecutive hours.   5) Once you are allowed to get the wound wet, please use gentle soap to wash the wound area. DO NOT aggressively scrub the wound with a washcloth or bath sponge. Please visually inspect your wound(s) at least once daily. If the wound(s) are in a difficult to see location, please use a mirror or have someone else assist with visual inspection.   6) No scrubbing the wound. You may \"pad dry\" the wound, but do not rub, as this may open up the wound and pre-dispose to wound infection.   7) Do not apply lotions or creams to incision site, unless instructed otherwise.   8) Observe for redness, swelling, or " drainage. Please call the clinic immediately if you have fevers, chills with warmth/redness surrounding wound site or if you notice pus drainage from the wound site     Activity   No heavy lifting objects greater than 10 pounds.   No driving while on narcotic pain medication.   No submerging wound under standing water (pool, bath tub, etc.) until otherwise instructed.   You may be protected weightbearing as tolerated on your operative (right lower) extremity   Use a walker for ambulation for at least 2 weeks after surgery.  May wean from walker after 2 weeks if approved by your therapist.   Posterior hip precautions for 6 weeks: No bending the hip past 90 degrees. Do not allow the leg to cross the midline of your body (adduction). No twisting motions. Ask your physical therapist to review these precautions with you.     Blood Clot Prophylaxis   (Aspirin vs. Lovenox vs. Eliquis administration is determined by your surgeon and tailored to your specific risk profile. You will be discharged with one of these medications.) You will need to complete a total 4 week course of enteric coated aspirin 325 mg (or 81mg) twice daily or Eliquis 2.5mg twice daily, in order to minimize your risk of blood clots following surgery. You will be supplied with a prescription to obtain this. Alternatively, you will need to compete a total 2 week course of Lovenox after surgery (followed by a 2 week course of aspirin twice daily), in order to minimize the risk of blood clots following surgery. Lovenox requires a single shot in the abdomen, to be taken once daily. You will be supplied with the prescription to obtain this. Prior to your discharge from the hospital, the nursing staff will instruct you on self-administration of the Lovenox, if you will be returning directly home from the hospital.     Discharge Pain Medications   You will be given a prescription for pain medication. You should start taking this the same day after your surgery.  "Wean off as tolerated. Do not wait to take the pain medication until the pain is severe, as it will be difficult to \"catch up\" once this occurs. The pain medication usually reaches its full effect ~1 hour after ingesting. If you have been sent home on Valium, this medication works well for muscle spasms. If you have been sent home on Colace, this medication should be taken until you are off all narcotic (i.e. Norco, Percocet, Oxycodone, etc) pain medications, in order to prevent constipation. Percocet or Norco have Tylenol in their ingredient lists. You must be careful not to exceed 4,000mg (4 grams) of Tylenol, from all sources, within a single 24-hr period. This means that you may not take more than 12 Percocets or Norcos within a 24-hr period. Do NOT take Regular or Extra Strength Tylenol when taking your Percocet or Norco medications.  If you have been sent home with a combination of oxycodone and Tylenol, please take Tylenol as scheduled.  The oxycodone is to be taken as needed for \"breakthrough\" pain.  Some common side effects of the narcotic pain medications (Percocet, Oxycodone, Norco, etc) include nausea and itching. Benadryl is a great over the counter medication that helps calm your stomach, decreases your anxiety levels, and minimizes the itching. You can easily purchase this at your local pharmacy as an over-the-counter medication. Please abide by the instructions as printed on the bottle. If your nausea persists, make sure to take small amounts of crackers or other lighter foods.     Follow-Up   Follow-up with Dr. Diana's office in 3 weeks from the surgery date for a post-operative evaluation. Have the following xrays done upon arrival to the follow-up appointment: AP pelvis. Please call Dr. Diana's office at [phone] for orthopaedic appointments or questions.  "

## 2021-08-02 NOTE — ANESTHESIA POSTPROCEDURE EVALUATION
Patient: Natalia Estrada    Procedure Summary     Date: 08/02/21 Room / Location:  [facility]    Anesthesia Start: 1244 Anesthesia Stop:     Procedure: RIGHT TOTAL HIP ARTHROPLASTY (Right Hip) Diagnosis:       Primary osteoarthritis of right hip      (Primary osteoarthritis of right hip [M16.11])    Surgeons: Edwardo Diana MD Provider: Arianna Mattson MD    Anesthesia Type: Not recorded ASA Status: 2          Anesthesia Type: No value filed.    Vitals  Vitals Value Taken Time   /61 08/02/21 1508   Temp 97.9 °F (36.6 °C) 08/02/21 1507   Pulse 63 08/02/21 1510   Resp 16 08/02/21 1507   SpO2 98 % 08/02/21 1510   Vitals shown include unvalidated device data.        Post Anesthesia Care and Evaluation    Patient location during evaluation: PACU  Patient participation: complete - patient cannot participate  Level of consciousness: responsive to physical stimuli  Pain score: 0  Pain management: adequate  Airway patency: patent  Anesthetic complications: No anesthetic complications    Cardiovascular status: stable  Respiratory status: acceptable, nasal cannula, oral airway and spontaneous ventilation  Hydration status: stable    Comments: Pt transferred to PACU with O2. Vital signs stable. Report to PACU RN and care accepted.

## 2021-08-02 NOTE — H&P
Patient Name: Natalia Estrada  MRN: [id number]  : 1950  DOS: 2021    Attending: Edwardo Diana MD    Primary Care Provider: Mirna Stack MD      Chief complaint: Right hip pain    Subjective   Patient is a pleasant 70 y.o. female presented for scheduled surgery by Dr. Diana.    Per his note (This patient has a history of progressive right hip pain and arthritis. The hip pain is severe with activity and has progressed significantly. Non-operative treatment has been attempted, but has not improved or controlled symptoms during normal daily activities. Motion has become limited and rotation severely restricted. X-rays reveal moderate-to-severe eburnation of articular cartilage on the superior weight bearing surface of the hip with circumferential acetabular and femoral neck osteophytes consistent with advanced hip osteoarthritis. A total hip arthroplasty was recommended at this time.)       Ms. Estrada underwent right total hip arthroplasty under general anesthesia and periarticular block.  She tolerated surgery well and was admitted for further management.    Seen in her room postoperatively she is doing fairly well.  Drowsy but appropriate responses.  She was seen by PT earlier and ambulated 100 feet with a rolling walker step through gait pattern.  No complains of nausea, vomiting, or shortness of breath.    No history of DVT or PE.       Allergies   Allergen Reactions   • Erythromycin Other (See Comments)     Chest pain   • Sulfa Antibiotics Rash       Meds:  Medications Prior to Admission   Medication Sig Dispense Refill Last Dose   • Chlorhexidine Gluconate 4 % solution Shower daily with hibiclens solution as directed 5 days prior to surgery. 237 mL 0 2021 at Unknown time   • Cholecalciferol (VITAMIN D) 1000 UNITS tablet Take 1,000 Units by mouth Daily.   2021 at 2100   • Multiple Vitamin (MULTI VITAMIN DAILY PO) Take 1 tablet by mouth Daily.   2021 at 2100   • mupirocin (BACTROBAN)  2 % ointment Apply pea-sized amount to each nostril twice daily for 5 days prior to surgery 22 g 0 8/1/2021 at 2100   • psyllium (METAMUCIL) 0.52 G capsule Take 1 capsule by mouth Daily As Needed for Constipation.   8/1/2021 at 2100   • mupirocin (BACTROBAN) 2 % ointment Apply pea-sized amount to each nostril twice daily for 5 days prior to surgery 22 g 0    • nabumetone (RELAFEN) 750 MG tablet Take 750 mg by mouth 2 (Two) Times a Day.   7/29/2021   • tolterodine LA (DETROL LA) 4 MG 24 hr capsule Take 1 capsule by mouth Daily. (Patient taking differently: Take 4 mg by mouth Daily As Needed (over active bladder, only takes when traveling).) 90 capsule 3 7/31/2021          Past Medical History:   Diagnosis Date   • Abdominal pain, left lower quadrant 4/29/2016    Impression: 3/2/17 normal abd CT (except mild interstitial scarring lung bases); 11/14/2014 - ddx includes colon spasm, diverticulitis, less likely reflux, gastritis, pancreatitis, GYN etiology (h/o YULIYA/BSO but note (+)FH ovarian CA); with exam findings and hx, proceed with trial of SL levsin; if no better, plan to check labs and CT for further evaluation; note last colonoscopy done 2010 (to be re   • Arthritis    • Diarrhea 04/29/2016    Impression: 05/22/2014 - reassuring that sxs come/go; discontinue stool softener until resolution of sxs; monitor for food triggers; add fiber supplement 1x/day;  consider addition of probiotic but rec 30d trial if she proceeds; f/u if sxs do not resolve;    • Hx of AAA ultrasound 02/28/2014    neg AAA ultrasound (2/28/14)   • Hx of bone density study 03/07/2014    DEXA (3/7/14): L -1.4, H -1.6   • Hx of bone density study 03/16/2017    DEXA (3/16/17) L -1.7, H -1.3, repeat 3 yrs   • Hx of bone density study 07/31/2020    DEXA (7/31/20): L -1.6, H -1.3, repeat 3 yrs   • Hx of colonoscopy 04/18/2016    colonosc (4/18/16): hyperplastic polyp, diverticulosis, repeat 10 yrs; GI - Dr. Knight   • Hx of echocardiogram 03/16/2014  "   ECHO (2/17): mild concentric LVH and diastolic dysfunction, mild MR/TR/AR   • Hx of pelvic ultrasound 02/05/2020    nl pelvic u/s (2/5/20); ovaries absent   • Hx of thyroid ultrasound 01/30/2018    nl thyr u/s (1/30/18)   • OAB (overactive bladder)      Past Surgical History:   Procedure Laterality Date   • BILATERAL OOPHORECTOMY  02/2003    due to (+)FH ovarian CA; GYN - Dr. Resendiz   • CATARACT EXTRACTION Bilateral    • COLONOSCOPY     • CYST REMOVAL      toe, right middle    • INCISIONAL BREAST BIOPSY      x2 (5/71 and 12/81)surg - Dr. Dawson     Family History   Problem Relation Age of Onset   • Goiter Sister    • Heart attack Sister         tob   • Stroke Sister    • Alpha-1 antitrypsin deficiency Sister         carrier   • Arthritis Sister    • Hyperlipidemia Sister    • Thyroid nodules Sister    • Rheum arthritis Sister    • Alpha-1 antitrypsin deficiency Sister         carrier   • Ovarian cancer Mother    • Arthritis Mother    • Ovarian cancer Sister    • Hypothyroidism Sister    • Alpha-1 antitrypsin deficiency Grandchild         carrier   • Heart attack Brother    • Breast cancer Sister      Social History     Tobacco Use   • Smoking status: Never Smoker   • Smokeless tobacco: Never Used   Vaping Use   • Vaping Use: Never used   Substance Use Topics   • Alcohol use: Never   • Drug use: Never   ,  Retired .    Review of Systems  Pertinent items are noted in HPI, all other systems reviewed and negative    Vital Signs  /69 (BP Location: Right arm, Patient Position: Lying)   Pulse 70   Temp 97.6 °F (36.4 °C) (Temporal)   Resp 18   Ht 160 cm (63\")   Wt 64.9 kg (143 lb)   SpO2 100%   BMI 25.33 kg/m²     Physical Exam:    General Appearance:    Alert, cooperative, in no acute distress   Head:    Normocephalic, without obvious abnormality, atraumatic   Eyes:            Lids and lashes normal, conjunctivae and sclerae normal, no   icterus, no pallor, corneas clear    Ears:    " Ears appear intact with no abnormalities noted   Throat:   No oral lesions, no thrush, oral mucosa moist   Neck:   No adenopathy, supple, trachea midline, no thyromegaly         Lungs:     Clear to auscultation,respirations regular, even and   unlabored. No wheezes or rales.    Heart:    Regular rhythm and normal rate, normal S1 and S2, 1-2 over 6 murmur, no gallop   Abdomen:     Normal bowel sounds, no masses, no organomegaly, soft        non-tender, non-distended, no guarding, no rebound                 tenderness   Genitalia:    Deferred   Extremities:  Right LE, CDI Aquacel dressing dressing over right hip incision.  No clubbing, cyanosis, or edema distally.   Pulses:   Pulses palpable and equal bilaterally   Skin:   No bleeding, bruising or rash   Neurologic:   Cranial nerves 2 - 12 grossly intact, intact flexion and dorsiflexion bilateral feet.      I reviewed the patient's new clinical results.             Invalid input(s): NEUTOPHILPCT,  EOSPCT        Invalid input(s): LABALBU, PROT  Lab Results   Component Value Date    HGBA1C 6.10 (H) 07/22/2021     Results for VALENTINA LITTLE (MRN [id number]) as of 8/3/2021 08:32   Ref. Range 7/22/2021 08:51   Glucose Latest Ref Range: 65 - 99 mg/dL 106 (H)   Sodium Latest Ref Range: 136 - 145 mmol/L 137   Potassium Latest Ref Range: 3.5 - 5.2 mmol/L 4.4   CO2 Latest Ref Range: 22.0 - 29.0 mmol/L 25.0   Chloride Latest Ref Range: 98 - 107 mmol/L 101   Anion Gap Latest Ref Range: 5.0 - 15.0 mmol/L 11.0   Creatinine Latest Ref Range: 0.57 - 1.00 mg/dL 0.63   BUN Latest Ref Range: 8 - 23 mg/dL 18   BUN/Creatinine Ratio Latest Ref Range: 7.0 - 25.0  28.6 (H)   Calcium Latest Ref Range: 8.6 - 10.5 mg/dL 9.4   eGFR Non  Am Latest Ref Range: >60 mL/min/1.73 93   Hemoglobin A1C Latest Ref Range: 4.80 - 5.60 % 6.10 (H)   Protime Latest Ref Range: 11.4 - 14.4 Seconds 12.4   INR Latest Ref Range: 0.85 - 1.16  0.95   PTT Latest Ref Range: 22.0 - 39.0 seconds 24.1   WBC Latest  Ref Range: 3.40 - 10.80 10*3/mm3 5.24   RBC Latest Ref Range: 3.77 - 5.28 10*6/mm3 3.86   Hemoglobin Latest Ref Range: 12.0 - 15.9 g/dL 12.1   Hematocrit Latest Ref Range: 34.0 - 46.6 % 37.0   RDW Latest Ref Range: 12.3 - 15.4 % 12.9   MCV Latest Ref Range: 79.0 - 97.0 fL 95.9   MCH Latest Ref Range: 26.6 - 33.0 pg 31.3   MCHC Latest Ref Range: 31.5 - 35.7 g/dL 32.7   MPV Latest Ref Range: 6.0 - 12.0 fL 9.2   Platelets Latest Ref Range: 140 - 450 10*3/mm3 323   RDW-SD Latest Ref Range: 37.0 - 54.0 fl 45.7         Assessment and Plan:       Status post total replacement of right hip    Alpha-1-antitrypsin deficiency carrier    Primary osteoarthritis of right hip    Right hip pain  Elevated hemoglobin A1c    Plan:    1. PT/OT, protected weight bearing as tolerated right LE.  Posterior hip precautions for 6 weeks  2. Pain control-prns  3. IS-encourage  4. DVT proph- Mechanicals and aspirin  5. Bowel regimen  6. Resume home medications as appropriate  7. Monitor post-op labs  8. DC planning for home    We will explain to patient implication of A1C elevation, need to modify diet and increase activity, and f/u with PCP.  Monitor blood glucose during hospital stay.    Dragon disclaimer:  Part of this encounter note is an electronic transcription/translation of spoken language to printed text. The electronic translation of spoken language may permit erroneous, or at times, nonsensical words or phrases to be inadvertently transcribed; Although I have reviewed the note for such errors, some may still exist.    Randolph Quiroga MD  08/02/21  16:30 EDT

## 2021-08-02 NOTE — ANESTHESIA PROCEDURE NOTES
Airway  Urgency: elective    Date/Time: 8/2/2021 12:51 PM  Airway not difficult    General Information and Staff    Patient location during procedure: OR  CRNA: Iraida Delgado CRNA    Indications and Patient Condition  Indications for airway management: airway protection    Preoxygenated: yes  MILS maintained throughout  Mask difficulty assessment: 2 - vent by mask + OA or adjuvant +/- NMBA    Final Airway Details  Final airway type: endotracheal airway      Successful airway: ETT  Cuffed: yes   Successful intubation technique: direct laryngoscopy  Endotracheal tube insertion site: oral  Blade: Le  Blade size: 2  ETT size (mm): 7.0  Cormack-Lehane Classification: grade I - full view of glottis  Placement verified by: chest auscultation and capnometry   Cuff volume (mL): 5  Measured from: lips  ETT/EBT  to lips (cm): 21  Number of attempts at approach: 1  Assessment: lips, teeth, and gum same as pre-op and atraumatic intubation    Additional Comments  Pt to OR 11.Pt moved self to OR table. ASA monitors placed. Pre-O2 with 100% Oxygen. SIVI. Atraumatic intubation. +ETCO2, +BBS.

## 2021-08-02 NOTE — BRIEF OP NOTE
TOTAL HIP ARTHROPLASTY  Progress Note    Natalia Estrada  8/2/2021    Pre-op Diagnosis:   Primary osteoarthritis of right hip [M16.11]       Post-Op Diagnosis Codes:     * Primary osteoarthritis of right hip [M16.11]    Procedure/CPT® Codes:  ND TOTAL HIP ARTHROPLASTY [51619]      Procedure(s):  RIGHT TOTAL HIP ARTHROPLASTY    Surgeon(s):  Edwardo Diana MD    Anesthesia: Spinal    Staff:   Circulator: Key Peña RN; Kavita Ramirez RN  Radiology Technologist: Thad Bowles  Scrub Person: Key Puga  Vendor Representative: Benoit Cantu  Nursing Assistant: Mary Gunderson PCT  Assistant: Kirti Tom PA-C  Assistant: Kirti Tom PA-C      Estimated Blood Loss: 200 mL    Urine Voided: * No values recorded between 8/2/2021 12:44 PM and 8/2/2021  2:29 PM *    Specimens:                None          Drains: * No LDAs found *    Findings: End-stage osteoarthritis right hip    Complications: None apparent    Assistant: Kirti Tom PA-C  was responsible for performing the following activities: Retraction, Suction, Irrigation, Suturing, Closing and Placing Dressing and their skilled assistance was necessary for the success of this case.    Edwardo Diana MD     Date: 8/2/2021  Time: 14:52 EDT

## 2021-08-02 NOTE — PLAN OF CARE
Problem: Adult Inpatient Plan of Care  Goal: Plan of Care Review  Recent Flowsheet Documentation  Taken 8/2/2021 1700 by Catherine Jain, PT  Progress: improving  Plan of Care Reviewed With:   patient   spouse  Outcome Summary: Patient ambulated 100 feet with RW and step through gait pattern, limited by fatigue and weakness. Plan is d/c home with family and HHPT. Encouraged patient to ambulate again with nursing later this PM. Will continue to progress as able.   Goal Outcome Evaluation:  Plan of Care Reviewed With: patient, spouse        Progress: improving  Outcome Summary: Patient ambulated 100 feet with RW and step through gait pattern, limited by fatigue and weakness. Plan is d/c home with family and HHPT. Encouraged patient to ambulate again with nursing later this PM. Will continue to progress as able.

## 2021-08-03 ENCOUNTER — PATIENT MESSAGE (OUTPATIENT)
Dept: INTERNAL MEDICINE | Facility: CLINIC | Age: 71
End: 2021-08-03

## 2021-08-03 ENCOUNTER — READMISSION MANAGEMENT (OUTPATIENT)
Dept: CALL CENTER | Facility: HOSPITAL | Age: 71
End: 2021-08-03

## 2021-08-03 VITALS
BODY MASS INDEX: 25.34 KG/M2 | WEIGHT: 143 LBS | HEIGHT: 63 IN | RESPIRATION RATE: 18 BRPM | SYSTOLIC BLOOD PRESSURE: 120 MMHG | HEART RATE: 74 BPM | TEMPERATURE: 97.9 F | DIASTOLIC BLOOD PRESSURE: 65 MMHG | OXYGEN SATURATION: 96 %

## 2021-08-03 DIAGNOSIS — D64.9 POSTOPERATIVE ANEMIA: ICD-10-CM

## 2021-08-03 DIAGNOSIS — E87.1 HYPONATREMIA: Primary | ICD-10-CM

## 2021-08-03 PROBLEM — G89.18 POSTOPERATIVE PAIN: Status: ACTIVE | Noted: 2021-08-03

## 2021-08-03 LAB
ALBUMIN SERPL-MCNC: 3.4 G/DL (ref 3.5–5.2)
ALBUMIN/GLOB SERPL: 1.6 G/DL
ALP SERPL-CCNC: 45 U/L (ref 39–117)
ALT SERPL W P-5'-P-CCNC: 15 U/L (ref 1–33)
ANION GAP SERPL CALCULATED.3IONS-SCNC: 10 MMOL/L (ref 5–15)
ANION GAP SERPL CALCULATED.3IONS-SCNC: 9 MMOL/L (ref 5–15)
AST SERPL-CCNC: 35 U/L (ref 1–32)
BILIRUB SERPL-MCNC: 0.2 MG/DL (ref 0–1.2)
BUN SERPL-MCNC: 11 MG/DL (ref 8–23)
BUN SERPL-MCNC: 13 MG/DL (ref 8–23)
BUN/CREAT SERPL: 16.2 (ref 7–25)
BUN/CREAT SERPL: 23.2 (ref 7–25)
CALCIUM SPEC-SCNC: 8.3 MG/DL (ref 8.6–10.5)
CALCIUM SPEC-SCNC: 8.3 MG/DL (ref 8.6–10.5)
CHLORIDE SERPL-SCNC: 93 MMOL/L (ref 98–107)
CHLORIDE SERPL-SCNC: 96 MMOL/L (ref 98–107)
CO2 SERPL-SCNC: 23 MMOL/L (ref 22–29)
CO2 SERPL-SCNC: 26 MMOL/L (ref 22–29)
CREAT SERPL-MCNC: 0.56 MG/DL (ref 0.57–1)
CREAT SERPL-MCNC: 0.68 MG/DL (ref 0.57–1)
GFR SERPL CREATININE-BSD FRML MDRD: 106 ML/MIN/1.73
GFR SERPL CREATININE-BSD FRML MDRD: 85 ML/MIN/1.73
GLOBULIN UR ELPH-MCNC: 2.1 GM/DL
GLUCOSE BLDC GLUCOMTR-MCNC: 146 MG/DL (ref 70–130)
GLUCOSE SERPL-MCNC: 149 MG/DL (ref 65–99)
GLUCOSE SERPL-MCNC: 171 MG/DL (ref 65–99)
HCT VFR BLD AUTO: 29.5 % (ref 34–46.6)
HGB BLD-MCNC: 9.9 G/DL (ref 12–15.9)
POTASSIUM SERPL-SCNC: 3.9 MMOL/L (ref 3.5–5.2)
POTASSIUM SERPL-SCNC: 4.4 MMOL/L (ref 3.5–5.2)
PROT SERPL-MCNC: 5.5 G/DL (ref 6–8.5)
SODIUM SERPL-SCNC: 126 MMOL/L (ref 136–145)
SODIUM SERPL-SCNC: 131 MMOL/L (ref 136–145)

## 2021-08-03 PROCEDURE — A9270 NON-COVERED ITEM OR SERVICE: HCPCS | Performed by: ORTHOPAEDIC SURGERY

## 2021-08-03 PROCEDURE — 85014 HEMATOCRIT: CPT | Performed by: ORTHOPAEDIC SURGERY

## 2021-08-03 PROCEDURE — G0378 HOSPITAL OBSERVATION PER HR: HCPCS

## 2021-08-03 PROCEDURE — 25010000002 CEFAZOLIN PER 500 MG: Performed by: ORTHOPAEDIC SURGERY

## 2021-08-03 PROCEDURE — 63710000001 MELOXICAM 7.5 MG TABLET: Performed by: ORTHOPAEDIC SURGERY

## 2021-08-03 PROCEDURE — 97110 THERAPEUTIC EXERCISES: CPT

## 2021-08-03 PROCEDURE — 63710000001 ASPIRIN 81 MG TABLET DELAYED-RELEASE: Performed by: ORTHOPAEDIC SURGERY

## 2021-08-03 PROCEDURE — 97530 THERAPEUTIC ACTIVITIES: CPT

## 2021-08-03 PROCEDURE — 80053 COMPREHEN METABOLIC PANEL: CPT | Performed by: ORTHOPAEDIC SURGERY

## 2021-08-03 PROCEDURE — 63710000001 ACETAMINOPHEN 500 MG TABLET: Performed by: ORTHOPAEDIC SURGERY

## 2021-08-03 PROCEDURE — 99024 POSTOP FOLLOW-UP VISIT: CPT | Performed by: ORTHOPAEDIC SURGERY

## 2021-08-03 PROCEDURE — 97116 GAIT TRAINING THERAPY: CPT

## 2021-08-03 PROCEDURE — 85018 HEMOGLOBIN: CPT | Performed by: ORTHOPAEDIC SURGERY

## 2021-08-03 PROCEDURE — 97165 OT EVAL LOW COMPLEX 30 MIN: CPT | Performed by: OCCUPATIONAL THERAPIST

## 2021-08-03 PROCEDURE — 82962 GLUCOSE BLOOD TEST: CPT

## 2021-08-03 PROCEDURE — 97535 SELF CARE MNGMENT TRAINING: CPT | Performed by: OCCUPATIONAL THERAPIST

## 2021-08-03 RX ORDER — DOCUSATE SODIUM 100 MG/1
100 CAPSULE, LIQUID FILLED ORAL 2 TIMES DAILY
Qty: 30 CAPSULE | Refills: 0 | Status: SHIPPED | OUTPATIENT
Start: 2021-08-03 | End: 2021-08-18

## 2021-08-03 RX ORDER — ACETAMINOPHEN 500 MG
1000 TABLET ORAL EVERY 8 HOURS
Qty: 42 TABLET | Refills: 0
Start: 2021-08-03 | End: 2021-08-10

## 2021-08-03 RX ORDER — ONDANSETRON 4 MG/1
4 TABLET, FILM COATED ORAL EVERY 8 HOURS PRN
Qty: 20 TABLET | Refills: 0 | Status: SHIPPED | OUTPATIENT
Start: 2021-08-03 | End: 2022-01-06

## 2021-08-03 RX ORDER — ASPIRIN 81 MG/1
81 TABLET ORAL 2 TIMES DAILY
Qty: 60 TABLET | Refills: 0 | Status: SHIPPED | OUTPATIENT
Start: 2021-08-03 | End: 2021-10-28

## 2021-08-03 RX ORDER — NICOTINE POLACRILEX 4 MG
15 LOZENGE BUCCAL
Status: DISCONTINUED | OUTPATIENT
Start: 2021-08-03 | End: 2021-08-03 | Stop reason: HOSPADM

## 2021-08-03 RX ORDER — OXYCODONE HYDROCHLORIDE 5 MG/1
5 TABLET ORAL EVERY 4 HOURS PRN
Qty: 40 TABLET | Refills: 0 | Status: SHIPPED | OUTPATIENT
Start: 2021-08-03 | End: 2021-09-03

## 2021-08-03 RX ORDER — NABUMETONE 750 MG/1
750 TABLET, FILM COATED ORAL 2 TIMES DAILY
Start: 2021-08-03 | End: 2022-03-17

## 2021-08-03 RX ORDER — MELOXICAM 15 MG/1
15 TABLET ORAL DAILY
Qty: 15 TABLET | Refills: 0 | Status: SHIPPED | OUTPATIENT
Start: 2021-08-03 | End: 2021-08-18

## 2021-08-03 RX ORDER — DEXTROSE MONOHYDRATE 25 G/50ML
25 INJECTION, SOLUTION INTRAVENOUS
Status: DISCONTINUED | OUTPATIENT
Start: 2021-08-03 | End: 2021-08-03 | Stop reason: HOSPADM

## 2021-08-03 RX ADMIN — ACETAMINOPHEN 1000 MG: 500 TABLET, FILM COATED ORAL at 12:17

## 2021-08-03 RX ADMIN — MELOXICAM 15 MG: 7.5 TABLET ORAL at 08:18

## 2021-08-03 RX ADMIN — CEFAZOLIN 2 G: 10 INJECTION, POWDER, FOR SOLUTION INTRAVENOUS at 04:25

## 2021-08-03 RX ADMIN — ACETAMINOPHEN 1000 MG: 500 TABLET, FILM COATED ORAL at 06:07

## 2021-08-03 RX ADMIN — ASPIRIN 81 MG: 81 TABLET, COATED ORAL at 08:18

## 2021-08-03 NOTE — PLAN OF CARE
Problem: Adult Inpatient Plan of Care  Goal: Plan of Care Review  Outcome: Ongoing, Progressing  Flowsheets  Taken 8/2/2021 2036 by Mindy Stallworth RN  Plan of Care Reviewed With:   patient   spouse  Outcome Summary: pain well controlled, ambulating with PT and nursing, unable to void on dayshift, bladder scanned for 388ml, encouraged ambulating and attempting in couple of hrs, VSS, remains on RA, cont to monitor  Taken 8/2/2021 1700 by Catherine Jain, PT  Progress: improving  Goal: Patient-Specific Goal (Individualized)  Outcome: Ongoing, Progressing  Goal: Absence of Hospital-Acquired Illness or Injury  Outcome: Ongoing, Progressing  Intervention: Identify and Manage Fall Risk  Recent Flowsheet Documentation  Taken 8/2/2021 1800 by Mindy Stallworth RN  Safety Promotion/Fall Prevention:   activity supervised   room organization consistent   safety round/check completed   toileting scheduled  Taken 8/2/2021 1625 by Mindy Stallworth RN  Safety Promotion/Fall Prevention:   activity supervised   room organization consistent   safety round/check completed   toileting scheduled  Intervention: Prevent Skin Injury  Recent Flowsheet Documentation  Taken 8/2/2021 1625 by Mindy Stallworth RN  Body Position: supine  Skin Protection: tubing/devices free from skin contact  Intervention: Prevent and Manage VTE (venous thromboembolism) Risk  Recent Flowsheet Documentation  Taken 8/2/2021 1800 by Mindy Stallworth RN  VTE Prevention/Management:   bilateral   sequential compression devices on  Taken 8/2/2021 1625 by Mindy Stallworth RN  VTE Prevention/Management:   bilateral   sequential compression devices on  Goal: Optimal Comfort and Wellbeing  Outcome: Ongoing, Progressing  Goal: Readiness for Transition of Care  Outcome: Ongoing, Progressing     Problem: Bleeding (Hip Arthroplasty)  Goal: Absence of Bleeding  Outcome: Ongoing, Progressing     Problem: Bowel Elimination Impaired (Hip Arthroplasty)  Goal: Effective Bowel  Elimination  Outcome: Ongoing, Progressing     Problem: Infection (Hip Arthroplasty)  Goal: Absence of Infection Signs and Symptoms  Outcome: Ongoing, Progressing     Problem: Joint Function Impaired (Hip Arthroplasty)  Goal: Optimal Functional Ability  Outcome: Ongoing, Progressing  Intervention: Protect Joint Integrity  Recent Flowsheet Documentation  Taken 8/2/2021 1625 by Mindy Stallworth RN  Positioning/Transfer Devices:   aBduction splint/pillow   in use     Problem: Ongoing Anesthesia Effects (Hip Arthroplasty)  Goal: Anesthesia/Sedation Recovery  Outcome: Ongoing, Progressing  Intervention: Optimize Anesthesia Recovery  Recent Flowsheet Documentation  Taken 8/2/2021 1800 by Mindy Stallworth RN  Patient Tolerance (IS): good  Safety Promotion/Fall Prevention:   activity supervised   room organization consistent   safety round/check completed   toileting scheduled  Taken 8/2/2021 1625 by Mindy Stallworth RN  $ Incentive Spirometry: yes  Patient Tolerance (IS): good  Safety Promotion/Fall Prevention:   activity supervised   room organization consistent   safety round/check completed   toileting scheduled  Administration (IS): instruction provided, initial  Level Incentive Spirometer (mL): 1500  Number of Repetitions (IS): 5     Problem: Pain (Hip Arthroplasty)  Goal: Acceptable Pain Control  Outcome: Ongoing, Progressing  Intervention: Prevent or Manage Pain  Recent Flowsheet Documentation  Taken 8/2/2021 1625 by Mindy Stallworth RN  Pain Management Interventions: cold applied     Problem: Postoperative Nausea and Vomiting (Hip Arthroplasty)  Goal: Nausea and Vomiting Relief  Outcome: Ongoing, Progressing     Problem: Postoperative Urinary Retention (Hip Arthroplasty)  Goal: Effective Urinary Elimination  Outcome: Ongoing, Progressing   Goal Outcome Evaluation:

## 2021-08-03 NOTE — DISCHARGE SUMMARY
Patient Name: Natalia Estrada  MRN: [id number]  : 1950  DOS: 8/3/2021    Attending: No att. providers found    Primary Care Provider: Mirna Stack MD    Date of Admission:.2021 10:11 AM    Date of Discharge:  8/3/2021    Discharge Diagnosis:   Status post total replacement of right hip    Impaired fasting glucose    Alpha-1-antitrypsin deficiency carrier    Primary osteoarthritis of right hip    Right hip pain    Postoperative pain  Hyponatremia    Hospital Course  At admit:  Patient is a pleasant 70 y.o. female presented for scheduled surgery by Dr. Diana.     Per his note (This patient has a history of progressive right hip pain and arthritis. The hip pain is severe with activity and has progressed significantly. Non-operative treatment has been attempted, but has not improved or controlled symptoms during normal daily activities. Motion has become limited and rotation severely restricted. X-rays reveal moderate-to-severe eburnation of articular cartilage on the superior weight bearing surface of the hip with circumferential acetabular and femoral neck osteophytes consistent with advanced hip osteoarthritis. A total hip arthroplasty was recommended at this time.)        Ms. Estrada underwent right total hip arthroplasty under general anesthesia and periarticular block.  She tolerated surgery well and was admitted for further management.     Seen in her room postoperatively she is doing fairly well.  Drowsy but appropriate responses.  She was seen by PT earlier and ambulated 100 feet with a rolling walker step through gait pattern.  No complains of nausea, vomiting, or shortness of breath.     No history of DVT or PE.     After admit  Patient was provided pain medications as needed for pain control.  Adjustments were made to pain medications to optimize postop pain management when indicated. Risks and benefits of opiate medications discussed with patient. YESIKA report was reviewed.    She was seen  "by PT and OT and has progressed well over her stay.    She used an IS for atelectasis prophylaxis and aspirin along with mechanicals for DVT prophylaxis.    Home medications were resumed as appropriate, and labs were monitored and remained fairly stable.     With the progress she has made, she is ready for DC home today.      Discussed with patient regarding plan and she shows understanding and agreement.    Patient will have HHPT following discharge.        Procedures Performed  Procedure(s):  TOTAL HIP ARTHROPLASTY RIGHT       Pertinent Test Results:    I reviewed the patient's new clinical results.   Results from last 7 days   Lab Units 21  0626   HEMOGLOBIN g/dL 9.9*   HEMATOCRIT % 29.5*     Results from last 7 days   Lab Units 21  1056 21  0626   SODIUM mmol/L 126* 131*   POTASSIUM mmol/L 3.9 4.4   CHLORIDE mmol/L 93* 96*   CO2 mmol/L 23.0 26.0   BUN mg/dL 11 13   CREATININE mg/dL 0.68 0.56*   CALCIUM mg/dL 8.3* 8.3*   BILIRUBIN mg/dL 0.2  --    ALK PHOS U/L 45  --    ALT (SGPT) U/L 15  --    AST (SGOT) U/L 35*  --    GLUCOSE mg/dL 171* 149*     I reviewed the patient's new imaging including images and reports.          Physical therapy  Patient demonstrated safe mobility with walker ambulating 200 feet on floor. She is going home with home health    Discharge Assessment:      Visit Vitals  /65 (BP Location: Right arm, Patient Position: Lying)   Pulse 74   Temp 97.9 °F (36.6 °C) (Oral)   Resp 18   Ht 160 cm (63\")   Wt 64.9 kg (143 lb)   SpO2 96%   BMI 25.33 kg/m²     Temp (24hrs), Av.6 °F (36.4 °C), Min:97.5 °F (36.4 °C), Max:97.9 °F (36.6 °C)      General Appearance:    Alert, cooperative, in no acute distress   Lungs:     Clear to auscultation,respirations regular, even and                   unlabored    Heart:    Regular rhythm and normal rate, normal S1 and S2   Abdomen:     Normal bowel sounds, no masses, no organomegaly, soft        non-tender, non-distended, no guarding, no " rebound                 tenderness   Extremities:   CDI dressing right hip   Pulses:   Pulses palpable and equal bilaterally   Skin:   No bleeding, bruising or rash   Neurologic:   Cranial nerves 2 - 12 grossly intact, sensation intact, Flexion and dorsiflexion intact bilateral feet.         Discharge Disposition: Home         Discharge Medications      New Medications      Instructions Start Date   acetaminophen 500 MG tablet  Commonly known as: TYLENOL   1,000 mg, Oral, Every 8 Hours, Take every 8 hours  as needed after 1 week      aspirin 81 MG EC tablet  Commonly known as: ASPIR   81 mg, Oral, 2 Times Daily      docusate sodium 100 MG capsule  Commonly known as: COLACE   100 mg, Oral, 2 Times Daily      meloxicam 15 MG tablet  Commonly known as: MOBIC   15 mg, Oral, Daily      ondansetron 4 MG tablet  Commonly known as: Zofran   4 mg, Oral, Every 8 Hours PRN      oxyCODONE 5 MG immediate release tablet  Commonly known as: Roxicodone   5 mg, Oral, Every 4 Hours PRN         Changes to Medications      Instructions Start Date   nabumetone 750 MG tablet  Commonly known as: RELAFEN  What changed:   · when to take this  · additional instructions   750 mg, Oral, 2 Times Daily, Resume after finishing Mobic script      tolterodine LA 4 MG 24 hr capsule  Commonly known as: DETROL LA  What changed:   · when to take this  · reasons to take this   4 mg, Oral, Daily         Continue These Medications      Instructions Start Date   cholecalciferol 25 MCG (1000 UT) tablet  Commonly known as: VITAMIN D3   1,000 Units, Oral, Daily      Metamucil 0.52 g capsule  Generic drug: psyllium   1 capsule, Oral, Daily PRN      multivitamin tablet tablet  Commonly known as: THERAGRAN   1 tablet, Oral, Daily         Stop These Medications    Antiseptic Skin Cleanser 4 % solution  Generic drug: Chlorhexidine Gluconate     mupirocin 2 % ointment  Commonly known as: BACTROBAN            Discharge Diet: Regular, fluid restriction 1 L/day until  seen by PCP    Activity at Discharge:  Protected weight bearing as tolerated right lower extremity, posterior hip precautions x6 weeks    Follow-up Appointments  Follow-up with Dr. Diana in 3 weeks  Follow-up with PCP in 2 to 3 days for BMP, Sodium recheck    Patient was notified of hyponatremia and advised to restrict fluids to 1 L/day until able to be seen by PCP in 2 to 3 days for a follow-up BMP to recheck sodium level    Dragon disclaimer:  Part of this encounter note is an electronic transcription/translation of spoken language to printed text. The electronic translation of spoken language may permit erroneous, or at times, nonsensical words or phrases to be inadvertently transcribed; Although I have reviewed the note for such errors, some may still exist.       JOSE Anders  08/03/21  16:29 EDT    Discharge took less than 30 minutes    I have personally performed the evaluation on this patient. My history is consistent  with HPI obtained. My exam findings are listed above. I have personally reviewed and discussed the above formulated discharge plan with patient, her  and Catherine GARCIA.wy

## 2021-08-03 NOTE — PLAN OF CARE
Goal Outcome Evaluation:  Plan of Care Reviewed With: patient, spouse           Outcome Summary: OT educated pt on RLE PHP and incorporation into ADL routine. OT issued necessary AE items and educated pt on use. She completed LB dressing task with CGA and toileting with CGA. Recommend DC home with family assist.   TAKE HALF A PILL AT NIGHT FOR 1 WEEK  After that take half a pill morning and night for 1 week  After that you take a whole pill morning and night  You can continue to take her Serena Ranks just as you are doing  I am reorder filling your Zyprexa that you can use during the day for occasions of anxiety  You can also use the Zyprexa at night for teeth grinding that works very well  Get her fasting blood work done  Call me and let me know if you are due for your DEXA scan  Get her mammo done

## 2021-08-03 NOTE — OUTREACH NOTE
Prep Survey      Responses   [facility] patient discharged from?  [place]   Is LACE score < 7 ?  Yes   Emergency Room discharge w/ pulse ox?  No   Eligibility  [facility]   Date of Admission  08/02/21   Date of Discharge  08/03/21   [place] [facility] [place] diagnosis  Status post total replacement of right hip   Does the patient have one of the following disease processes/diagnoses(primary or secondary)?  Total Joint Replacement   Does the patient have Home health ordered?  Yes   What is the Home health agency?   [facility]   Is there a DME ordered?  No   Prep survey completed?  Yes          Mirna Sr RN

## 2021-08-03 NOTE — THERAPY DISCHARGE NOTE
Patient Name: Natalia Estrada  : 1950    MRN: [id number]                              Today's Date: 8/3/2021       Admit Date: 2021    Visit Dx:     ICD-10-CM ICD-9-CM   1. Primary osteoarthritis of right hip  M16.11 715.15     Patient Active Problem List   Diagnosis   • Allergic rhinitis   • Abnormal finding on thyroid function test   • Digital mucinous cyst   • Fibrocystic breast changes   • Heart murmur   • Hyperlipidemia   • Impaired fasting glucose   • Breast pain   • Menopause   • Osteoarthritis of both hips   • Rash   • Osteopenia   • Diverticulosis of colon   • Medicare annual wellness visit, subsequent   • Urge incontinence of urine   • Right SNHL   • GERD (gastroesophageal reflux disease)   • Hyperplastic polyp of descending colon   • Alpha-1-antitrypsin deficiency carrier   • Primary osteoarthritis of right hip   • Right hip pain   • Status post total replacement of right hip     Past Medical History:   Diagnosis Date   • Abdominal pain, left lower quadrant 2016    Impression: 3/2/17 normal abd CT (except mild interstitial scarring lung bases); 2014 - ddx includes colon spasm, diverticulitis, less likely reflux, gastritis, pancreatitis, GYN etiology (h/o YULIYA/BSO but note (+)FH ovarian CA); with exam findings and hx, proceed with trial of SL levsin; if no better, plan to check labs and CT for further evaluation; note last colonoscopy done  (to be re   • Arthritis    • Diarrhea 2016    Impression: 2014 - reassuring that sxs come/go; discontinue stool softener until resolution of sxs; monitor for food triggers; add fiber supplement 1x/day;  consider addition of probiotic but rec 30d trial if she proceeds; f/u if sxs do not resolve;    • Hx of AAA ultrasound 2014    neg AAA ultrasound (14)   • Hx of bone density study 2014    DEXA (3/7/14): L -1.4, H -1.6   • Hx of bone density study 2017    DEXA (3/16/17) L -1.7, H -1.3, repeat 3 yrs   • Hx of bone  density study 07/31/2020    DEXA (7/31/20): L -1.6, H -1.3, repeat 3 yrs   • Hx of colonoscopy 04/18/2016    colonosc (4/18/16): hyperplastic polyp, diverticulosis, repeat 10 yrs; GI - Dr. Knight   • Hx of echocardiogram 03/16/2014    ECHO (2/17): mild concentric LVH and diastolic dysfunction, mild MR/TR/AR   • Hx of pelvic ultrasound 02/05/2020    nl pelvic u/s (2/5/20); ovaries absent   • Hx of thyroid ultrasound 01/30/2018    nl thyr u/s (1/30/18)   • OAB (overactive bladder)      Past Surgical History:   Procedure Laterality Date   • BILATERAL OOPHORECTOMY  02/2003    due to (+)FH ovarian CA; GYN - Dr. Resendiz   • CATARACT EXTRACTION Bilateral    • COLONOSCOPY     • CYST REMOVAL      toe, right middle    • INCISIONAL BREAST BIOPSY      x2 (5/71 and 12/81)surg - Dr. Dawson   • TOTAL HIP ARTHROPLASTY Right 8/2/2021    Procedure: TOTAL HIP ARTHROPLASTY RIGHT;  Surgeon: Edwardo Diana MD;  Location: [facility];  Service: Orthopedics;  Laterality: Right;     General Information     Row Name 08/03/21 0925          Physical Therapy Time and Intention    Document Type  evaluation  -SC     Mode of Treatment  physical therapy;individual therapy  -SC     Row Name 08/03/21 0925          General Information    Patient Profile Reviewed  yes  -SC     Existing Precautions/Restrictions  fall;right;hip, posterior  -SC     Row Name 08/03/21 0925          Cognition    Orientation Status (Cognition)  oriented x 4  -SC     Row Name 08/03/21 0925          Safety Issues, Functional Mobility    Impairments Affecting Function (Mobility)  pain;endurance/activity tolerance;strength  -SC     Comment, Safety Issues/Impairments (Mobility)  alert, following  commanding  -SC       User Key  (r) = Recorded By, (t) = Taken By, (c) = Cosigned By    Initials Name Provider Type    SC Coco Cary PT Physical Therapist        Mobility     Row Name 08/03/21 0926          Bed Mobility    Bed Mobility  scooting/bridging  -SC      Scooting/Bridging [place] (Bed Mobility)  independent  -SC     Supine-Sit [place] (Bed Mobility)  independent  -SC     Comment (Bed Mobility)  cues to sequencing getting out of bed . Patient able to push up on elbows and scoot legs over edge of bed  -SC     Row Name 08/03/21 0926          Transfers    Comment (Transfers)  Cues for seqencing STS with correct hip precations. Demonstrated good technique  -SC     Row Name 08/03/21 0926          Sit-Stand Transfer    Sit-Stand [place] (Transfers)  modified independence  -SC     Assistive Device (Sit-Stand Transfers)  walker, front-wheeled  -SC     Row Name 08/03/21 0926          Gait/Stairs (Locomotion)    [place] Level (Gait)  modified independence  -SC     Assistive Device (Gait)  walker, front-wheeled  -SC     Distance in Feet (Gait)  200  -SC     Deviations/Abnormal Patterns (Gait)  right sided deviations;antalgic;weight shifting decreased  -SC     Bilateral Gait Deviations  forward flexed posture;heel strike decreased  -SC     Number of Steps (Stairs)  1 up backwards with good technique  -SC     Comment (Gait/Stairs)  GT training focused on sequencing step through gait pattern. Patient demonstrated good technique. No Lob  -[facility] Name 08/03/21 0926          Mobility    Extremity Weight-bearing Status  right lower extremity  -SC     Right Lower Extremity (Weight-bearing Status)  weight-bearing as tolerated (WBAT)  -SC       User Key  (r) = Recorded By, (t) = Taken By, (c) = Cosigned By    Initials Name Provider Type    SC Coco Cary PT Physical Therapist        Obj/Interventions     [facility] Name 08/03/21 0929          Motor Skills    Therapeutic Exercise  hip;knee;ankle  -[facility] Name 08/03/21 0929          Hip (Therapeutic Exercise)    Hip (Therapeutic Exercise)  AROM (active range of motion)  -SC     Hip AROM (Therapeutic Exercise)  right;extension;flexion;aBduction;aDduction;supine;10 repetitions bridging  -[facility] Name 08/03/21  0929          Knee (Therapeutic Exercise)    Knee (Therapeutic Exercise)  AROM (active range of motion)  -SC     Knee AROM (Therapeutic Exercise)  right;flexion;extension;sitting;10 repetitions  -SC     Knee Isometrics (Therapeutic Exercise)  bilateral;gluteal sets;quad sets;10 repetitions  -SC     Row Name 08/03/21 0929          Ankle (Therapeutic Exercise)    Ankle (Therapeutic Exercise)  AROM (active range of motion)  -SC     Ankle AROM (Therapeutic Exercise)  dorsiflexion;plantarflexion;bilateral;10 repetitions  -SC     Row Name 08/03/21 0929          Balance    Dynamic Standing Balance  mild impairment;supported  -SC     Comment, Balance  needs walker  -SC       User Key  (r) = Recorded By, (t) = Taken By, (c) = Cosigned By    Initials Name Provider Type    SC Coco Cary, PT Physical Therapist        Goals/Plan     Row Name 08/03/21 0933          Bed Mobility Goal 1 (PT)    Activity/Assistive Device (Bed Mobility Goal 1, PT)  sit to supine/supine to sit  -SC     [place] Level/Cues Needed (Bed Mobility Goal 1, PT)  modified independence  -SC     Time Frame (Bed Mobility Goal 1, PT)  long term goal (LTG);3 days  -SC     Progress/Outcomes (Bed Mobility Goal 1, PT)  goal met  -[facility] Name 08/03/21 0933          Transfer Goal 1 (PT)    Activity/Assistive Device (Transfer Goal 1, PT)  sit-to-stand/stand-to-sit;walker, rolling  -SC     [place] Level/Cues Needed (Transfer Goal 1, PT)  modified independence  -SC     Time Frame (Transfer Goal 1, PT)  long term goal (LTG);3 days  -SC     Progress/Outcome (Transfer Goal 1, PT)  goal met  -SC     Row Name 08/03/21 0933          Gait Training Goal 1 (PT)    Activity/Assistive Device (Gait Training Goal 1, PT)  gait (walking locomotion);walker, rolling  -SC     [place] Level (Gait Training Goal 1, PT)  modified independence  -SC     Distance (Gait Training Goal 1, PT)  150 feet  -SC     Time Frame (Gait Training Goal 1, PT)  long term goal (LTG);3 days   -SC     Progress/Outcome (Gait Training Goal 1, PT)  goal met  -SC     Row Name 08/03/21 0933          Stairs Goal 1 (PT)    Activity/Assistive Device (Stairs Goal 1, PT)  ascending stairs;descending stairs;step-to-step;walker, rolling  -SC     [place] Level/Cues Needed (Stairs Goal 1, PT)  contact guard assist  -SC     Number of Stairs (Stairs Goal 1, PT)  1  -SC     Time Frame (Stairs Goal 1, PT)  long term goal (LTG);3 days  -SC     Progress/Outcome (Stairs Goal 1, PT)  goal met  -SC       User Key  (r) = Recorded By, (t) = Taken By, (c) = Cosigned By    Initials Name Provider Type    SC Coco Cary, PT Physical Therapist        Clinical Impression     Row Name 08/03/21 0931          Pain    Additional Documentation  Pain Scale: Numbers Pre/Post-Treatment (Group)  -[facility] Name 08/03/21 0931          Pain Scale: Numbers Pre/Post-Treatment    Pretreatment Pain Rating  4/10  -SC     Posttreatment Pain Rating  4/10  -SC     Pain Location - Side  Right  -SC     Pain Location  hip  -SC     Pain Intervention(s)  Cold applied;Repositioned  -[facility] Name 08/03/21 0931          Plan of Care Review    Plan of Care Reviewed With  patient  -SC     Progress  improving  -SC     Outcome Summary  Patient demonstrated safe mobility with walker ambulating 200 feet on floor. She is going home with home health  -[facility] Name 08/03/21 0931          Therapy Assessment/Plan (PT)    Patient/Family Therapy Goals Statement (PT)  go home  -SC     Rehab Potential (PT)  good, to achieve stated therapy goals  -SC     Criteria for Skilled Interventions Met (PT)  yes;meets criteria;skilled treatment is necessary  -SC     Row Name 08/03/21 0931          Positioning and Restraints    Pre-Treatment Position  in bed  -SC     Post Treatment Position  chair  -SC     In Chair  notified nsg;reclined;sitting;call light within reach;encouraged to call for assist;exit alarm on;with family/caregiver  -SC       User Key  (r) = Recorded  By, (t) = Taken By, (c) = Cosigned By    Initials Name Provider Type    SC Coco Cary, PT Physical Therapist        Outcome Measures     Row Name 08/03/21 0933 08/03/21 0800       How much help from another person do you currently need...    Turning from your back to your side while in flat bed without using bedrails?  4  -SC  3  -MW    Moving from lying on back to sitting on the side of a flat bed without bedrails?  4  -SC  3  -MW    Moving to and from a bed to a chair (including a wheelchair)?  4  -SC  3  -MW    Standing up from a chair using your arms (e.g., wheelchair, bedside chair)?  4  -SC  3  -MW    Climbing 3-5 steps with a railing?  3  -SC  3  -MW    To walk in hospital room?  3  -SC  3  -MW    AM-PAC 6 Clicks Score (PT)  22  -SC  18  -MW    Row Name 08/03/21 0933          Functional Assessment    Outcome Measure Options  AM-PAC 6 Clicks Basic Mobility (PT)  -SC       User Key  (r) = Recorded By, (t) = Taken By, (c) = Cosigned By    Initials Name Provider Type    SC Coco Cary, PT Physical Therapist    Mindy Farley, RN Registered Nurse        Physical Therapy Education                 Title: PT OT SLP Therapies (Done)     Topic: Physical Therapy (Done)     Point: Mobility training (Done)     Learning Progress Summary           Patient SALUD Spears,STEVEN,H, VU,DU by SC at 8/3/2021 0934    Comment: reviewed HEP and hip precautions    Acceptance, E,D, VU,NR by  at 8/2/2021 1700    Comment: Educated on posterior hip precautions, weight bearing status, correct supine to sit t/f technique, correct sit <->stand t/f technique, correct gait mechanics, and progression of POC.   Family SAULD Spears,STEVEN,H, VU,DU by SC at 8/3/2021 0934    Comment: reviewed HEP and hip precautions   Significant Other Acceptance, E,D, VU,NR by  at 8/2/2021 1700    Comment: Educated on posterior hip precautions, weight bearing status, correct supine to sit t/f technique, correct sit <->stand t/f technique, correct gait mechanics,  and progression of POC.                   Point: Home exercise program (Done)     Learning Progress Summary           Patient Eaglisa, SALUD,D,H, VU,DU by SC at 8/3/2021 0934    Comment: reviewed HEP and hip precautions    Acceptance, E,D, VU,NR by LR at 8/2/2021 1700    Comment: Educated on posterior hip precautions, weight bearing status, correct supine to sit t/f technique, correct sit <->stand t/f technique, correct gait mechanics, and progression of POC.   Family Tory, SALUD,D,H, VU,DU by SC at 8/3/2021 0934    Comment: reviewed HEP and hip precautions   Significant Other Acceptance, E,D, VU,NR by LR at 8/2/2021 1700    Comment: Educated on posterior hip precautions, weight bearing status, correct supine to sit t/f technique, correct sit <->stand t/f technique, correct gait mechanics, and progression of POC.                   Point: Body mechanics (Done)     Learning Progress Summary           Patient Tory, SALUD,D,H, VU,DU by SC at 8/3/2021 0934    Comment: reviewed HEP and hip precautions    Acceptance, E,D, VU,NR by LR at 8/2/2021 1700    Comment: Educated on posterior hip precautions, weight bearing status, correct supine to sit t/f technique, correct sit <->stand t/f technique, correct gait mechanics, and progression of POC.   Family SALUD Spears,D,H, VU,DU by SC at 8/3/2021 0934    Comment: reviewed HEP and hip precautions   Significant Other Acceptance, E,D, VU,NR by LR at 8/2/2021 1700    Comment: Educated on posterior hip precautions, weight bearing status, correct supine to sit t/f technique, correct sit <->stand t/f technique, correct gait mechanics, and progression of POC.                   Point: Precautions (Done)     Learning Progress Summary           Patient Eaglisa, TB,D,H, VU,DU by SC at 8/3/2021 0934    Comment: reviewed HEP and hip precautions    Acceptance, E,D, VU,NR by LR at 8/2/2021 1700    Comment: Educated on posterior hip precautions, weight bearing status, correct supine to sit t/f technique,  correct sit <->stand t/f technique, correct gait mechanics, and progression of POC.   Family Eager, TB,D,H, BEST,DU by SC at 8/3/2021 0934    Comment: reviewed HEP and hip precautions   Significant Other Acceptance, E,D, BEST,NR by  at 8/2/2021 1700    Comment: Educated on posterior hip precautions, weight bearing status, correct supine to sit t/f technique, correct sit <->stand t/f technique, correct gait mechanics, and progression of POC.                               User Key     Initials Effective Dates Name Provider Type Discipline    SC 06/16/21 -  Coco Cary, PT Physical Therapist PT    LR 06/16/21 -  Catherine Jain, PT Physical Therapist PT              PT Recommendation and Plan     Plan of Care Reviewed With: patient  Progress: improving  Outcome Summary: Patient demonstrated safe mobility with walker ambulating 200 feet on floor. She is going home with home health     Time Calculation:   PT Charges     Row Name 08/03/21 0838             Time Calculation    Start Time  0838  -SC      PT Received On  08/03/21  -SC      PT Goal Re-Cert Due Date  08/12/21  -SC         Time Calculation- PT    Total Timed Code Minutes- PT  4 minute(s)  -SC         Timed Charges    64888 - PT Therapeutic Exercise Minutes  15  -SC      80997 - Gait Training Minutes   15  -SC      90251 - PT Therapeutic Activity Minutes  8  -SC         Total Minutes    Timed Charges Total Minutes  38  -SC       Total Minutes  38  -SC        User Key  (r) = Recorded By, (t) = Taken By, (c) = Cosigned By    Initials Name Provider Type    SC Coco Cary, PT Physical Therapist        Therapy Charges for Today     Code Description Service Date Service Provider Modifiers Qty    38150417722 HC PT THER PROC EA 15 MIN 8/3/2021 Mayco Caryon HARI, PT GP 1    94483058890 HC GAIT TRAINING EA 15 MIN 8/3/2021 Mayco Caryon HARI, PT GP 1    44289875366 HC PT THERAPEUTIC ACT EA 15 MIN 8/3/2021 Coco Cary, PT GP 1          PT G-Codes  Outcome Measure  Options: AM-PAC 6 Clicks Basic Mobility (PT)  AM-PAC 6 Clicks Score (PT): 22    PT Discharge Summary  Anticipated Discharge Disposition (PT): home with home health  Reason for Discharge: All goals achieved    Coco Cary, PT  8/3/2021

## 2021-08-03 NOTE — CASE MANAGEMENT/SOCIAL WORK
Discharge Planning Assessment  [facility]     Patient Name: Natalia Estrada  MRN: [id number]  Today's Date: 8/3/2021    Admit Date: 8/2/2021    Discharge Needs Assessment    No documentation.       Discharge Plan     Row Name 08/03/21 7138       Plan    Plan  Home with 's assistance and [facility]    Patient/Family in Agreement with Plan  yes    Plan Comments  Met with Ms. Estrada and her , Jun, at the bedside for discharge planning.  Ms. Estrada lives with her  in a one story home, 2 steps to enter, in [facility].    Ms. Estrada has been evaluated by PT and she is ambulating with a rolling walker.  She denies any DME needs.  She states that she uses a rollator and power lift chair at home.    Discussed physical therapy and she was agreeable to [facility].  Called referral to [facility] with [facility].    Ms. Estrada's PCP is Mirna LOBO.  She denies any issues with her insurance.    Ms. Estrada states that her  can assist her at home as needed.  Jun is transporting Ms. Estrada home when discharged.    CM will continue to follow.    Final Discharge Disposition Code  01 - home or self-care        Continued Care and Services - Discharged on 8/3/2021 Admission date: 8/2/2021 - Discharge disposition: Home or Self Care    Therapy Coordination complete.    Service Provider Request Status Selected Services Address Phone Fax Patient Preferred    [facility]   Selected Outpatient Physical Therapy [address] [place] [phone] --              Expected Discharge Date and Time     Expected Discharge Date Expected Discharge Time    Aug 3, 2021         Demographic Summary    No documentation.       Functional Status    No documentation.       Psychosocial    No documentation.       Abuse/Neglect    No documentation.       Legal    No documentation.       Substance Abuse    No documentation.       Patient Forms    No  documentation.           Sheree Malin RN

## 2021-08-03 NOTE — PLAN OF CARE
Goal Outcome Evaluation:  Plan of Care Reviewed With: patient        Progress: improving  Outcome Summary: Patient demonstrated safe mobility with walker ambulating 200 feet on floor. She is going home with home health

## 2021-08-03 NOTE — THERAPY DISCHARGE NOTE
Acute Care - Occupational Therapy Discharge  [facility]    Patient Name: Natalia Estrada  : 1950    MRN: [id number]                              Today's Date: 8/3/2021       Admit Date: 2021    Visit Dx:     ICD-10-CM ICD-9-CM   1. Status post total replacement of right hip  Z96.641 V43.64   2. Primary osteoarthritis of right hip  M16.11 715.15     Patient Active Problem List   Diagnosis   • Allergic rhinitis   • Abnormal finding on thyroid function test   • Digital mucinous cyst   • Fibrocystic breast changes   • Heart murmur   • Hyperlipidemia   • Impaired fasting glucose   • Breast pain   • Menopause   • Osteoarthritis of both hips   • Rash   • Osteopenia   • Diverticulosis of colon   • Medicare annual wellness visit, subsequent   • Urge incontinence of urine   • Right SNHL   • GERD (gastroesophageal reflux disease)   • Hyperplastic polyp of descending colon   • Alpha-1-antitrypsin deficiency carrier   • Primary osteoarthritis of right hip   • Right hip pain   • Status post total replacement of right hip   • Postoperative pain     Past Medical History:   Diagnosis Date   • Abdominal pain, left lower quadrant 2016    Impression: 3/2/17 normal abd CT (except mild interstitial scarring lung bases); 2014 - ddx includes colon spasm, diverticulitis, less likely reflux, gastritis, pancreatitis, GYN etiology (h/o YULIYA/BSO but note (+)FH ovarian CA); with exam findings and hx, proceed with trial of SL levsin; if no better, plan to check labs and CT for further evaluation; note last colonoscopy done  (to be re   • Arthritis    • Diarrhea 2016    Impression: 2014 - reassuring that sxs come/go; discontinue stool softener until resolution of sxs; monitor for food triggers; add fiber supplement 1x/day;  consider addition of probiotic but rec 30d trial if she proceeds; f/u if sxs do not resolve;    • Hx of AAA ultrasound 2014    neg AAA ultrasound (14)   • Hx of bone density  study 03/07/2014    DEXA (3/7/14): L -1.4, H -1.6   • Hx of bone density study 03/16/2017    DEXA (3/16/17) L -1.7, H -1.3, repeat 3 yrs   • Hx of bone density study 07/31/2020    DEXA (7/31/20): L -1.6, H -1.3, repeat 3 yrs   • Hx of colonoscopy 04/18/2016    colonosc (4/18/16): hyperplastic polyp, diverticulosis, repeat 10 yrs; GI - Dr. Knight   • Hx of echocardiogram 03/16/2014    ECHO (2/17): mild concentric LVH and diastolic dysfunction, mild MR/TR/AR   • Hx of pelvic ultrasound 02/05/2020    nl pelvic u/s (2/5/20); ovaries absent   • Hx of thyroid ultrasound 01/30/2018    nl thyr u/s (1/30/18)   • OAB (overactive bladder)      Past Surgical History:   Procedure Laterality Date   • BILATERAL OOPHORECTOMY  02/2003    due to (+)FH ovarian CA; GYN - Dr. Resendzi   • CATARACT EXTRACTION Bilateral    • COLONOSCOPY     • CYST REMOVAL      toe, right middle    • INCISIONAL BREAST BIOPSY      x2 (5/71 and 12/81)surg - Dr. Dawson   • TOTAL HIP ARTHROPLASTY Right 8/2/2021    Procedure: TOTAL HIP ARTHROPLASTY RIGHT;  Surgeon: Edwardo Diana MD;  Location: [facility];  Service: Orthopedics;  Laterality: Right;     General Information     Row Name 08/03/21 0929          OT Time and Intention    Document Type  evaluation;therapy note (daily note);discharge treatment  -AR     Mode of Treatment  individual therapy;occupational therapy  -AR     Row Name 08/03/21 0929          General Information    Patient Profile Reviewed  yes  -AR     Prior Level of Function  min assist:;all household mobility;community mobility;gait;transfer;ADL's  -AR     Existing Precautions/Restrictions  fall;right;hip, posterior  -AR     Barriers to Rehab  previous functional deficit  -AR     Row Name 08/03/21 0929          Living Environment    Lives With  spouse  -AR     Row Name 08/03/21 0929          Home Main Entrance    Number of Stairs, Main Entrance  two  -AR     Stair Railings, Main Entrance  none  -AR     Row Name 08/03/21 0929           Stairs Within Home, Primary    Stairs Comment, Within Home, Primary  Pt has walk-in shower with seat and raised commode  -AR     Row Name 08/03/21 0929          Cognition    Orientation Status (Cognition)  oriented x 4  -AR     Row Name 08/03/21 0929          Safety Issues, Functional Mobility    Safety Issues Affecting Function (Mobility)  safety precaution awareness;safety precautions follow-through/compliance  -AR     Impairments Affecting Function (Mobility)  pain;endurance/activity tolerance;strength;range of motion (ROM)  -AR       User Key  (r) = Recorded By, (t) = Taken By, (c) = Cosigned By    Initials Name Provider Type    AR Carlie Bach, OT Occupational Therapist        Mobility/ADL's     Row Name 08/03/21 0939          Bed Mobility    Comment (Bed Mobility)  Pt received up in chair. Issued leg lifer and educated pt on use.  -AR     Row Name 08/03/21 0939          Transfers    Transfers  sit-stand transfer;toilet transfer  -AR     Comment (Transfers)  Cues to advance RLE during stand-to-sit transition. Reviewed safe transfer technique.  -AR     Sit-Stand [place] (Transfers)  contact guard;verbal cues  -AR     [place] Level (Toilet Transfer)  contact guard;verbal cues  -AR     Assistive Device (Toilet Transfer)  walker, front-wheeled  -AR     Row Name 08/03/21 0939          Sit-Stand Transfer    Assistive Device (Sit-Stand Transfers)  walker, front-wheeled  -AR     Row Name 08/03/21 0939          Toilet Transfer    Type (Toilet Transfer)  sit-stand;stand-sit  -AR     Row Name 08/03/21 0939          Functional Mobility    Functional Mobility- Ind. Level  contact guard assist;verbal cues required  -AR     Functional Mobility- Device  rolling walker  -AR     Functional Mobility-Distance (Feet)  15  -AR     Functional Mobility-Maintain WBing  able to maintain weight bearing status  -AR     Row Name 08/03/21 0939          Activities of Daily Living    BADL Assessment/Intervention   bathing;lower body dressing;feeding;toileting;grooming  -AR     Row Name 08/03/21 0939          Mobility    Extremity Weight-bearing Status  right lower extremity  -AR     Right Lower Extremity (Weight-bearing Status)  weight-bearing as tolerated (WBAT)  -AR     Row Name 08/03/21 0939          Bathing Assessment/Intervention    Position (Bathing)  supported sitting  -AR     Comment (Bathing)  Pt recalling 3/3 RLE PHP. OT issued LH sponge to assist.  -AR     Row Name 08/03/21 0939          Lower Body Dressing Assessment/Training    [place] Level (Lower Body Dressing)  don;doff;socks;verbal cues;contact guard assist  -AR     Assistive Devices (Lower Body Dressing)  reacher;sock-aid  -AR     Position (Lower Body Dressing)  supported sitting  -AR     Comment (Lower Body Dressing)  OT educated pt on ADL retraining including AE use and incorporation of channing-dressing to assist with maintaining. OT issued self-care kit and edcuated pt on use. She declined having OT assist her with dressing.  -AR     Row Name 08/03/21 0939          Toileting Assessment/Training    [place] Level (Toileting)  supervision;verbal cues  -AR     Assistive Devices (Toileting)  grab bar/safety frame;raised toilet seat  -AR     Position (Toileting)  supported standing;supported sitting  -AR     Row Name 08/03/21 0939          Grooming Assessment/Training    [place] Level (Grooming)  wash face, hands;verbal cues;contact guard assist  -AR     Position (Grooming)  sink side;supported standing  -AR       User Key  (r) = Recorded By, (t) = Taken By, (c) = Cosigned By    Initials Name Provider Type    Carlie Espinal OT Occupational Therapist        Obj/Interventions     Row Name 08/03/21 0939          Sensory Assessment (Somatosensory)    Sensory Assessment (Somatosensory)  sensation intact  -AR     Row Name 08/03/21 0939          Vision Assessment/Intervention    Visual Impairment/Limitations  WNL  -AR     Row Name 08/03/21 0939           Range of Motion Comprehensive    General Range of Motion  bilateral upper extremity ROM WNL  -AR     Row Name 08/03/21 0939          Strength Comprehensive (MMT)    Comment, General Manual Muscle Testing (MMT) Assessment  BUE WFL  -AR     Row Name 08/03/21 0939          Balance    Balance Assessment  sitting static balance;sitting dynamic balance;standing static balance;standing dynamic balance  -AR     Static Sitting Balance  WNL;supported;sitting in chair  -AR     Dynamic Sitting Balance  WNL;supported;sitting in chair  -AR     Static Standing Balance  WNL;supported;standing  -AR     Dynamic Standing Balance  WFL;supported;standing  -AR       User Key  (r) = Recorded By, (t) = Taken By, (c) = Cosigned By    Initials Name Provider Type    Carlie Espinal, OT Occupational Therapist        Goals/Plan     Row Name 08/03/21 0939          Transfer Goal 1 (OT)    Activity/Assistive Device (Transfer Goal 1, OT)  sit-to-stand/stand-to-sit;toilet;walker, rolling;commode, 3-in-1  -AR     [place] Level/Cues Needed (Transfer Goal 1, OT)  verbal cues required;contact guard assist  -AR     Time Frame (Transfer Goal 1, OT)  short term goal (STG);1 day  -AR     Progress/Outcome (Transfer Goal 1, OT)  goal met  -AR     [facility] Name 08/03/21 0939          Dressing Goal 1 (OT)    Activity/Device (Dressing Goal 1, OT)  lower body dressing;reacher;sock-aid  -AR     [place]/Cues Needed (Dressing Goal 1, OT)  verbal cues required;contact guard assist  -AR     Time Frame (Dressing Goal 1, OT)  short term goal (STG);1 day  -AR     Progress/Outcome (Dressing Goal 1, OT)  goal met  -AR     Row Name 08/03/21 0939          Toileting Goal 1 (OT)    Activity/Device (Toileting Goal 1, OT)  toileting skills, all;grab bar/safety frame;raised toilet seat  -AR     [place] Level/Cues Needed (Toileting Goal 1, OT)  verbal cues required;contact guard assist  -AR     Time Frame (Toileting Goal 1, OT)  short term goal (STG);1 day   -AR     Progress/Outcome (Toileting Goal 1, OT)  goal met  -AR     Row Name 08/03/21 0939          Therapy Assessment/Plan (OT)    Planned Therapy Interventions (OT)  adaptive equipment training;BADL retraining;IADL retraining;occupation/activity based interventions;patient/caregiver education/training;ROM/therapeutic exercise;transfer/mobility retraining  -AR       User Key  (r) = Recorded By, (t) = Taken By, (c) = Cosigned By    Initials Name Provider Type    AR Carlie Bach, MAK Occupational Therapist        Clinical Impression     Row Name 08/03/21 0939          Pain Assessment    Additional Documentation  Pain Scale: Numbers Pre/Post-Treatment (Group)  -AR     Row Name 08/03/21 0939          Pain Scale: Numbers Pre/Post-Treatment    Pretreatment Pain Rating  4/10  -AR     Posttreatment Pain Rating  5/10  -AR     Pain Location - Side  Right  -AR     Pain Location - Orientation  generalized  -AR     Pain Location  hip  -AR     Pain Intervention(s)  Medication (See MAR);Cold applied;Repositioned;Ambulation/increased activity  -AR     Row Name 08/03/21 0939          Plan of Care Review    Plan of Care Reviewed With  patient;spouse  -AR     Outcome Summary  OT educated pt on RLE PHP and incorporation into ADL routine. OT issued necessary AE items and educated pt on use. She completed LB dressing task with CGA and toileting with CGA. Recommend DC home with family assist.  -AR     Row Name 08/03/21 0939          Therapy Assessment/Plan (OT)    Rehab Potential (OT)  good, to achieve stated therapy goals  -AR     Criteria for Skilled Therapeutic Interventions Met (OT)  yes  -AR     Therapy Frequency (OT)  evaluation only  -AR     Row Name 08/03/21 0939          Therapy Plan Review/Discharge Plan (OT)    Anticipated Discharge Disposition (OT)  home with 24/7 care  -AR     Row Name 08/03/21 0939          Vital Signs    Pre Patient Position  Sitting  -AR     Intra Patient Position  Standing  -AR     Post Patient  Position  Sitting  -AR     Row Name 08/03/21 0939          Positioning and Restraints    Pre-Treatment Position  sitting in chair/recliner  -AR     Post Treatment Position  chair  -AR     In Chair  reclined;call light within reach;encouraged to call for assist;exit alarm on;legs elevated;pillow between legs;on mechanical lift sling;compression device  -AR       User Key  (r) = Recorded By, (t) = Taken By, (c) = Cosigned By    Initials Name Provider Type    AR Carlie Bach, OT Occupational Therapist        Outcome Measures     Row Name 08/03/21 0939          How much help from another is currently needed...    Putting on and taking off regular lower body clothing?  3  -AR     Bathing (including washing, rinsing, and drying)  3  -AR     Toileting (which includes using toilet bed pan or urinal)  3  -AR     Putting on and taking off regular upper body clothing  3  -AR     Taking care of personal grooming (such as brushing teeth)  3  -AR     Eating meals  3  -AR     AM-PAC 6 Clicks Score (OT)  18  -AR     Row Name 08/03/21 0933 08/03/21 0800       How much help from another person do you currently need...    Turning from your back to your side while in flat bed without using bedrails?  4  -SC  3  -MW    Moving from lying on back to sitting on the side of a flat bed without bedrails?  4  -SC  3  -MW    Moving to and from a bed to a chair (including a wheelchair)?  4  -SC  3  -MW    Standing up from a chair using your arms (e.g., wheelchair, bedside chair)?  4  -SC  3  -MW    Climbing 3-5 steps with a railing?  3  -SC  3  -MW    To walk in hospital room?  3  -SC  3  -MW    AM-PAC 6 Clicks Score (PT)  22  -SC  18  -MW    Row Name 08/03/21 0939 08/03/21 0933       Functional Assessment    Outcome Measure Options  AM-PAC 6 Clicks Daily Activity (OT)  -AR  AM-PAC 6 Clicks Basic Mobility (PT)  -SC      User Key  (r) = Recorded By, (t) = Taken By, (c) = Cosigned By    Initials Name Provider Type    SC Coco Cary, PT  Physical Therapist    Carlie Espinal OT Occupational Therapist    Mindy Farley RN Registered Nurse        Occupational Therapy Education                 Title: PT OT SLP Therapies (Done)     Topic: Occupational Therapy (Done)     Point: ADL training (Done)     Description:   Instruct learner(s) on proper safety adaptation and remediation techniques during self care or transfers.   Instruct in proper use of assistive devices.              Learning Progress Summary           Patient Eager, E,TB,D, VU,DU by AR at 8/3/2021 0939                   Point: Precautions (Done)     Description:   Instruct learner(s) on prescribed precautions during self-care and functional transfers.              Learning Progress Summary           Patient Eager, E,TB,D, VU,DU by AR at 8/3/2021 0939                   Point: Body mechanics (Done)     Description:   Instruct learner(s) on proper positioning and spine alignment during self-care, functional mobility activities and/or exercises.              Learning Progress Summary           Patient Eager, E,TB,D, VU,DU by AR at 8/3/2021 0939                               User Key     Initials Effective Dates Name Provider Type Discipline    AR 06/16/21 -  Carlie Bach OT Occupational Therapist OT              OT Recommendation and Plan  Retired Outcome Summary/Treatment Plan (OT)  Anticipated Discharge Disposition (OT): home with 24/7 care  Planned Therapy Interventions (OT): adaptive equipment training, BADL retraining, IADL retraining, occupation/activity based interventions, patient/caregiver education/training, ROM/therapeutic exercise, transfer/mobility retraining  Therapy Frequency (OT): evaluation only  Plan of Care Review  Plan of Care Reviewed With: patient, spouse  Outcome Summary: OT educated pt on RLE PHP and incorporation into ADL routine. OT issued necessary AE items and educated pt on use. She completed LB dressing task with CGA and toileting with CGA.  Recommend DC home with family assist.  Plan of Care Reviewed With: patient, spouse  Outcome Summary: OT educated pt on RLE PHP and incorporation into ADL routine. OT issued necessary AE items and educated pt on use. She completed LB dressing task with CGA and toileting with CGA. Recommend DC home with family assist.     Time Calculation:   Time Calculation- OT     Row Name 08/03/21 1401 08/03/21 0838          Time Calculation- OT    OT Start Time  0939  -AR  --     OT Received On  08/03/21  -AR  --     OT Goal Re-Cert Due Date  08/13/21  -AR  --        Timed Charges    71904 - Gait Training Minutes   --  15  -SC     03168 - OT Self Care/Mgmt Minutes  11  -AR  --        Untimed Charges    OT Eval/Re-eval Minutes  59  -AR  --        Total Minutes    Timed Charges Total Minutes  11  -AR  15  -SC     Untimed Charges Total Minutes  59  -AR  --      Total Minutes  70  -AR  15  -SC       User Key  (r) = Recorded By, (t) = Taken By, (c) = Cosigned By    Initials Name Provider Type    SC Coco Cary, PT Physical Therapist    AR Carlie Bach OT Occupational Therapist        Therapy Charges for Today     Code Description Service Date Service Provider Modifiers Qty    31687207292 HC OT SELF CARE/MGMT/TRAIN EA 15 MIN 8/3/2021 Carlie Bach OT GO 1    77382142411 HC OT EVAL LOW COMPLEXITY 4 8/3/2021 Carlie Bach OT GO 1               Carlie Bach OT  8/3/2021

## 2021-08-03 NOTE — PLAN OF CARE
Goal Outcome Evaluation:  Plan of Care Reviewed With: patient        Progress: improving   Pt is alert and oriented x4. VSS on room air. Pt has slept off and on throughout  the shift. Scheduled tylenol given as ordered for pain. PRN zofran was given for intermittent nausea and vomiting. Aquacel to right hip is CDI. Pt ambulated 410ft in the hallway this shift and is up to the bathroom with an assist x1, gait belt, and walker. Voiding spontaneously. Will continue to monitor.

## 2021-08-03 NOTE — PROGRESS NOTES
"  SUBJECTIVE  Patient resting comfortably.  Pain well controlled.  No events overnight.    PHYSICAL THERAPY PROGRESS  Outcome Summary: Patient ambulated 100 feet with RW and step through gait pattern, limited by fatigue and weakness. Plan is d/c home with family and HHPT. Encouraged patient to ambulate again with nursing later this PM. Will continue to progress as able. (21 1700)     OBJECTIVE  Temp (24hrs), Av.6 °F (36.4 °C), Min:97.4 °F (36.3 °C), Max:97.9 °F (36.6 °C)    Blood pressure 139/60, pulse 81, temperature 97.6 °F (36.4 °C), temperature source Oral, resp. rate 18, height 160 cm (63\"), weight 64.9 kg (143 lb), SpO2 96 %.    Lab Results (last 24 hours)     Procedure Component Value Units Date/Time    Hemoglobin & Hematocrit, Blood [[id number]]  (Abnormal) Collected: 21    Specimen: Blood Updated: 2151     Hemoglobin 9.9 g/dL      Hematocrit 29.5 %     Basic Metabolic Panel [[id number]] Collected: 21    Specimen: Blood Updated: 21 0647    POC Glucose Once [[id number]]  (Normal) Collected: 21 1109    Specimen: Blood Updated: 21 1111     Glucose 91 mg/dL      Comment: Meter: [id number] : [id number] Kaiden TAVAREZ               PHYSICAL EXAM  Right lower extremity: Dressing clean, dry and intact.  Leg lengths symmetric.  Intact EHL, FHL, tibialis anterior, and gastrocsoleus. Sensation intact to light touch to deep peroneal, superficial peroneal, sural, saphenous, tibial nerves. 2+ palpable DP and PT pulses.         Status post total replacement of right hip    Impaired fasting glucose    Alpha-1-antitrypsin deficiency carrier    Primary osteoarthritis of right hip    Right hip pain      PLAN / DISPOSITION:  1 Day Post-Op right total hip arthroplasty    Protected weight bearing as tolerated right lower extremity, posterior hip precautions x6 weeks  Pain control  PT/OT for post op mobilization and medical equipment needs   23 hours perioperative " antibiotic prophylaxis   SCD's bilateral lower extremities   Aspirin for DVT prophylaxis   Social work for discharge planning.  Anticipate discharge home today.  Dressing to remain in place for 7 days. May remove on POD#7. If no drainage, may shower on POD#10. No submerging wound in water. If drainage is noted, sterile dressing should be placed and wound checked daily. No showering until wound has remained dry for 72 consecutive hours.   Follow up in 3 weeks for re-assessment.      Future Appointments   Date Time Provider Department Center   8/24/2021  9:20 AM Kirti Tom PA-C [facility]   9/9/2021  8:00 AM Mirna Stack MD [facility]   3/17/2022  9:00 AM Mirna Stack MD [facility]       Edwardo Diana MD  08/03/21  07:07 EDT

## 2021-08-04 ENCOUNTER — TRANSITIONAL CARE MANAGEMENT TELEPHONE ENCOUNTER (OUTPATIENT)
Dept: CALL CENTER | Facility: HOSPITAL | Age: 71
End: 2021-08-04

## 2021-08-04 NOTE — TELEPHONE ENCOUNTER
From: Natalia Estrada  To: Mirna Stack MD  Sent: 8/3/2021 5:10 PM EDT  Subject: Test Results Question    Doctor Sreekanth, I had a total hip replacement surgery yesterday. I came home today. Hospital called to say my sodium level is too low and they wanted me to have it rechecked at my primary care doctor this Thursday or Friday. I tried to call but didn't get an answer. I will try to call tomorrow or if you get this message, could you order the lab draw for Friday. Hopefully, I will be more Mobil by then. I can be contacted at [phone]

## 2021-08-04 NOTE — OUTREACH NOTE
Call Center TCM Note      Responses   [facility] patient discharged from?  [place]   Does the patient have one of the following disease processes/diagnoses(primary or secondary)?  Total Joint Replacement   TCM attempt successful?  Yes   Call start time  0930   Call end time  0934   Discharge diagnosis  Status post total replacement of right hip   Does the patient have all medications related to this admission filled (includes all antibiotics, pain medications, etc.)  Yes   Is the patient taking all medications as directed (includes completed medication regime)?  Yes   Is the patient able to teach back alternate methods of pain control?  Ice   Does the patient have a follow up appointment with their surgeon?  Yes [pt reports fu apt with surgeon on 8-24-21]   Has the patient kept scheduled appointments due by today?  N/A   Comments  No apts available with PCP within TCM guideline frame-will route message to group-instructed pt to call PCP as well- pt verbalized understanding   What is the Home health agency?   [facility]   Home health comments  Patient believes they are contacting her today    Psychosocial issues?  No   Did the patient receive a copy of their discharge instructions?  Yes   Nursing interventions  Reviewed instructions with patient   What is the patient's perception of their functional status since discharge?  Improving   Is the patient able to teach back signs and symptoms of infection?  Increased swelling or redness around incision (not associated with surgical edema), Temp >100.4 for 24h or longer   Is the patient able to teach back how to prevent infection?  Check incision daily   Is the patient able to teach back signs and symptoms of DVT?  Redness in calf, Swelling in calf, Area hot to touch   Is the patient able to teach back home safety measures?  Ability to shower   Did the patient implement home safety suggestions from pre-surgery classes if attended?  Yes   If the patient is a current  smoker, are they able to teach back resources for cessation?  Not a smoker   Is the patient/caregiver able to teach back the hierarchy of who to call/visit for symptoms/problems? PCP, Specialist, Home health nurse, Urgent Care, ED, 911  Yes   TCM call completed?  Yes          Asia Campos RN    8/4/2021, 09:36 EDT

## 2021-08-06 ENCOUNTER — LAB (OUTPATIENT)
Dept: LAB | Facility: HOSPITAL | Age: 71
End: 2021-08-06

## 2021-08-06 DIAGNOSIS — E87.1 HYPONATREMIA: ICD-10-CM

## 2021-08-06 DIAGNOSIS — D64.9 POSTOPERATIVE ANEMIA: ICD-10-CM

## 2021-08-06 LAB
ANION GAP SERPL CALCULATED.3IONS-SCNC: 8.4 MMOL/L (ref 5–15)
BASOPHILS # BLD AUTO: 0.07 10*3/MM3 (ref 0–0.2)
BASOPHILS NFR BLD AUTO: 0.7 % (ref 0–1.5)
BUN SERPL-MCNC: 16 MG/DL (ref 8–23)
BUN/CREAT SERPL: 28.6 (ref 7–25)
CALCIUM SPEC-SCNC: 9.1 MG/DL (ref 8.6–10.5)
CHLORIDE SERPL-SCNC: 98 MMOL/L (ref 98–107)
CO2 SERPL-SCNC: 27.6 MMOL/L (ref 22–29)
CREAT SERPL-MCNC: 0.56 MG/DL (ref 0.57–1)
DEPRECATED RDW RBC AUTO: 43.7 FL (ref 37–54)
EOSINOPHIL # BLD AUTO: 0.47 10*3/MM3 (ref 0–0.4)
EOSINOPHIL NFR BLD AUTO: 4.8 % (ref 0.3–6.2)
ERYTHROCYTE [DISTWIDTH] IN BLOOD BY AUTOMATED COUNT: 12.5 % (ref 12.3–15.4)
GFR SERPL CREATININE-BSD FRML MDRD: 106 ML/MIN/1.73
GLUCOSE SERPL-MCNC: 115 MG/DL (ref 65–99)
HCT VFR BLD AUTO: 33.9 % (ref 34–46.6)
HGB BLD-MCNC: 11.4 G/DL (ref 12–15.9)
IMM GRANULOCYTES # BLD AUTO: 0.08 10*3/MM3 (ref 0–0.05)
IMM GRANULOCYTES NFR BLD AUTO: 0.8 % (ref 0–0.5)
LYMPHOCYTES # BLD AUTO: 1.99 10*3/MM3 (ref 0.7–3.1)
LYMPHOCYTES NFR BLD AUTO: 20.4 % (ref 19.6–45.3)
MCH RBC QN AUTO: 32.2 PG (ref 26.6–33)
MCHC RBC AUTO-ENTMCNC: 33.6 G/DL (ref 31.5–35.7)
MCV RBC AUTO: 95.8 FL (ref 79–97)
MONOCYTES # BLD AUTO: 0.82 10*3/MM3 (ref 0.1–0.9)
MONOCYTES NFR BLD AUTO: 8.4 % (ref 5–12)
NEUTROPHILS NFR BLD AUTO: 6.33 10*3/MM3 (ref 1.7–7)
NEUTROPHILS NFR BLD AUTO: 64.9 % (ref 42.7–76)
NRBC BLD AUTO-RTO: 0 /100 WBC (ref 0–0.2)
OSMOLALITY SERPL: 289 MOSM/KG (ref 280–301)
OSMOLALITY UR: 529 MOSM/KG
PLATELET # BLD AUTO: 421 10*3/MM3 (ref 140–450)
PMV BLD AUTO: 10.4 FL (ref 6–12)
POTASSIUM SERPL-SCNC: 4.3 MMOL/L (ref 3.5–5.2)
RBC # BLD AUTO: 3.54 10*6/MM3 (ref 3.77–5.28)
SODIUM SERPL-SCNC: 134 MMOL/L (ref 136–145)
WBC # BLD AUTO: 9.76 10*3/MM3 (ref 3.4–10.8)

## 2021-08-06 PROCEDURE — 80048 BASIC METABOLIC PNL TOTAL CA: CPT

## 2021-08-06 PROCEDURE — 85025 COMPLETE CBC W/AUTO DIFF WBC: CPT

## 2021-08-06 PROCEDURE — 83935 ASSAY OF URINE OSMOLALITY: CPT

## 2021-08-06 PROCEDURE — 83930 ASSAY OF BLOOD OSMOLALITY: CPT

## 2021-08-07 NOTE — PROGRESS NOTES
Blood counts show mild anemia but improved     Regarding the Na, it is better but please try to drink only about 1.5L fluids per day between now and f/u appt. We will plan to repeat bloodwork that day - you should be hydrated and nonfasting for your visit

## 2021-08-09 ENCOUNTER — TELEPHONE (OUTPATIENT)
Dept: INTERNAL MEDICINE | Facility: CLINIC | Age: 71
End: 2021-08-09

## 2021-08-09 NOTE — TELEPHONE ENCOUNTER
----- Message from Mirna Stack MD sent at 8/7/2021 12:16 AM EDT -----  Blood counts show mild anemia but improved     Regarding the Na, it is better but please try to drink only about 1.5L fluids per day between now and f/u appt. We will plan to repeat bloodwork that day - you should be hydrated and nonfasting for your visit

## 2021-08-11 PROBLEM — H90.5 RIGHT SNHL: Chronic | Status: ACTIVE | Noted: 2017-09-29

## 2021-08-11 PROBLEM — M16.11 PRIMARY OSTEOARTHRITIS OF RIGHT HIP: Chronic | Status: ACTIVE | Noted: 2021-05-13

## 2021-08-11 PROBLEM — K63.5 HYPERPLASTIC POLYP OF DESCENDING COLON: Chronic | Status: ACTIVE | Noted: 2018-02-24

## 2021-08-11 PROBLEM — Z14.8 ALPHA-1-ANTITRYPSIN DEFICIENCY CARRIER: Chronic | Status: ACTIVE | Noted: 2020-03-02

## 2021-08-11 PROBLEM — K21.9 GERD (GASTROESOPHAGEAL REFLUX DISEASE): Chronic | Status: ACTIVE | Noted: 2018-01-26

## 2021-08-11 PROBLEM — N39.41 URGE INCONTINENCE OF URINE: Chronic | Status: ACTIVE | Noted: 2017-02-23

## 2021-08-11 NOTE — PROGRESS NOTES
TRANSITIONAL CARE HOSPITAL FOLLOW-UP VISIT    Hospitalized at:    [x]   [facility]  []   [place]  []     []   Other -    Outside records reviewed. Pertinent radiology and labs reviewed  []   Records requested    Dates of hospitalization:  Admission - 8/2/21         Discharge - 8/3/21   If transferred to rehab facility, date of discharge : n/a    Hospital Course: admitted for right hip replacement due to OA. Started PT in the hospital No complications except for postop anemia and hyponatremia - was told to f/u labs as outpatient.     Lab/Radiology Results:   8/3/21 BMP with Na 131, then 126; H&H 9.9/29.5      New Medications or Medication Changes:   · Oxycodone 5mg q4 prn  · ASA 81mg BID x30d for DVT prophylaxis  · meloxicam 15mg QD x 15d  · Acetaminophen prn  · Docusate prn  · Zofran prn     Current outpatient and discharge medications have been reconciled for the patient.  Reviewed by: Mirna Stack MD      Status:  better    Current symptoms:   · Increased indigestion and constipation, both with slowed bowel movements as well as hard stools  · Denies chest pain, palpitations, shortness of breath  · Denies fevers    Post discharge concerns/issues: Right hip area healing well (with tape) has just got released today from home health PT to outpatient PT; note concerns with indigestion and constipation since hospital discharge.  Has not taking any oxycodone since leaving the hospital.  Pain currently is controlled with meloxicam and Tylenol.    Treatment plan   Med Changes:  •  reviewed and updated with patient      •  was not required at this time     Referrals: none    Risk for Readmission (LACE) Score: 1 (8/3/2021  6:02 AM)      Follow-up appointments: f/u ortho Dr. Diana in 3 wks    Discussed with: patient  ___________________________________________________________  Chief Complaint   Patient presents with   • Transitional Care Management     Hip replacement       History of Present Illness  70 y.o.   "woman presents for hospital f/u after R. THR. Hospital course uneventful.  States that she took her last session of home health PT today and will be following up as an outpatient here on out.  Did not take any oxycodone since hospital discharge but complains of increased indigestion constipation since arrival home.  Reports fewer bowel movements as well as hard stools.  She is taking MiraLAX daily as well as docusate twice daily.  For pain, she is on meloxicam daily as well as as needed Tylenol.    Review of Systems  ROS (+) for indigestion and constipation but otherwise denies chest pain, palpitations, shortness of breath, vomiting, diarrhea, fever/chills.  States that her right hip pain is minimal, definitely much less than prior to surgery.  All other ROS reviewed and negative.    Current Outpatient Medications:   •  aspirin (ASPIR) 81 MG BID x 30d  •  Cholecalciferol (VITAMIN D) 1000 UNITS QD  •  docusate sodium (COLACE) 100 MG BID  •  meloxicam (MOBIC) 15 MG QD x 15d  •  Multiple Vitamin QD  •  nabumetone (RELAFEN) 750 MG BID prn after stopping meloxicam  •  ondansetron (Zofran) 4 MG q4 prn  •  oxyCODONE (Roxicodone) 5 MG q4 prn  •  psyllium (METAMUCIL) 0.52 G prn  •  tolterodine LA (DETROL LA) 4 MG QD      VITALS:  /68   Pulse 85   Ht 160 cm (63\")   Wt 65.3 kg (144 lb)   SpO2 98%   BMI 25.51 kg/m²     Physical Exam  Vitals and nursing note reviewed.   Constitutional:       General: She is not in acute distress.     Appearance: Normal appearance. She is not ill-appearing or diaphoretic.   Eyes:      Extraocular Movements: Extraocular movements intact.      Conjunctiva/sclera: Conjunctivae normal.   Pulmonary:      Effort: Pulmonary effort is normal. No respiratory distress.   Musculoskeletal:      Comments: Using rolling walker   Skin:     Comments: Right hip with longitudinal curved incision that is well-healed with tape, no surrounding erythema, no significant scabbing, no drainage   Neurological: "      Mental Status: She is alert and oriented to person, place, and time. Mental status is at baseline.   Psychiatric:         Mood and Affect: Mood normal.         Behavior: Behavior normal.         LABS  Results for orders placed or performed in visit on 08/06/21   Basic Metabolic Panel    Specimen: Blood   Result Value Ref Range    Glucose 115 (H) 65 - 99 mg/dL    BUN 16 8 - 23 mg/dL    Creatinine 0.56 (L) 0.57 - 1.00 mg/dL    Sodium 134 (L) 136 - 145 mmol/L    Potassium 4.3 3.5 - 5.2 mmol/L    Chloride 98 98 - 107 mmol/L    CO2 27.6 22.0 - 29.0 mmol/L    Calcium 9.1 8.6 - 10.5 mg/dL    eGFR Non African Amer 106 >60 mL/min/1.73    BUN/Creatinine Ratio 28.6 (H) 7.0 - 25.0    Anion Gap 8.4 5.0 - 15.0 mmol/L   Osmolality, Serum    Specimen: Blood   Result Value Ref Range    Osmolality 289 280 - 301 mOsm/kg   CBC Auto Differential    Specimen: Blood   Result Value Ref Range    WBC 9.76 3.40 - 10.80 10*3/mm3    RBC 3.54 (L) 3.77 - 5.28 10*6/mm3    Hemoglobin 11.4 (L) 12.0 - 15.9 g/dL    Hematocrit 33.9 (L) 34.0 - 46.6 %    MCV 95.8 79.0 - 97.0 fL    MCH 32.2 26.6 - 33.0 pg    MCHC 33.6 31.5 - 35.7 g/dL    RDW 12.5 12.3 - 15.4 %    RDW-SD 43.7 37.0 - 54.0 fl    MPV 10.4 6.0 - 12.0 fL    Platelets 421 140 - 450 10*3/mm3    Neutrophil % 64.9 42.7 - 76.0 %    Lymphocyte % 20.4 19.6 - 45.3 %    Monocyte % 8.4 5.0 - 12.0 %    Eosinophil % 4.8 0.3 - 6.2 %    Basophil % 0.7 0.0 - 1.5 %    Immature Grans % 0.8 (H) 0.0 - 0.5 %    Neutrophils, Absolute 6.33 1.70 - 7.00 10*3/mm3    Lymphocytes, Absolute 1.99 0.70 - 3.10 10*3/mm3    Monocytes, Absolute 0.82 0.10 - 0.90 10*3/mm3    Eosinophils, Absolute 0.47 (H) 0.00 - 0.40 10*3/mm3    Basophils, Absolute 0.07 0.00 - 0.20 10*3/mm3    Immature Grans, Absolute 0.08 (H) 0.00 - 0.05 10*3/mm3    nRBC 0.0 0.0 - 0.2 /100 WBC   Osmolality, Urine - Urine, Clean Catch    Specimen: Urine, Clean Catch   Result Value Ref Range    Osmolality, Urine 529 mOsm/kg     7/22/21 A1C  6.1    ASSESSMENT/PLAN  Diagnoses and all orders for this visit:    1. Osteoarthritis of both hips (Primary)  Assessment & Plan:  S/p R. THR per Dr. Diana; ongoing PT -about to start on outpatient basis; ASA 81mg BID x 30d for DVT prophylaxis and meloxicam 15mg QD x 15d; when she finishes meloxicam, recommend not going directly back to scheduled nabumetone twice daily; she should try to see if she can utilize this medication and/or Tylenol only on an as-needed basis; fall precautions and discussed importance of home exercise program      2. Hyponatremia  Comments:  labs c/w SIADH; repeat BMP on the way out with 1.5L fluid restriction  Orders:  -     Basic Metabolic Panel; Future    3. Postoperative anemia  Comments:  f/u CBC  Orders:  -     CBC & Differential; Future    4. Impaired fasting glucose  Assessment & Plan:  Follow-up A1c in 2 months        FOLLOW-UP  1. Health maintenance - COVID19 vaccine done  2. BMP and CBC on the way out the door  3. RTC in 2 mos with A1C (bump 9/9/21 appt)    Electronically signed by:    Mirna Stack MD, FACP  08/12/2021

## 2021-08-11 NOTE — ASSESSMENT & PLAN NOTE
S/p R. THR per Dr. Diana; ongoing PT -about to start on outpatient basis; ASA 81mg BID x 30d for DVT prophylaxis and meloxicam 15mg QD x 15d; when she finishes meloxicam, recommend not going directly back to scheduled nabumetone twice daily; she should try to see if she can utilize this medication and/or Tylenol only on an as-needed basis; fall precautions and discussed importance of home exercise program

## 2021-08-12 ENCOUNTER — LAB (OUTPATIENT)
Dept: LAB | Facility: HOSPITAL | Age: 71
End: 2021-08-12

## 2021-08-12 ENCOUNTER — OFFICE VISIT (OUTPATIENT)
Dept: INTERNAL MEDICINE | Facility: CLINIC | Age: 71
End: 2021-08-12

## 2021-08-12 VITALS
WEIGHT: 144 LBS | OXYGEN SATURATION: 98 % | BODY MASS INDEX: 25.52 KG/M2 | SYSTOLIC BLOOD PRESSURE: 112 MMHG | DIASTOLIC BLOOD PRESSURE: 68 MMHG | HEIGHT: 63 IN | HEART RATE: 85 BPM

## 2021-08-12 DIAGNOSIS — M16.0 PRIMARY OSTEOARTHRITIS OF BOTH HIPS: Primary | Chronic | ICD-10-CM

## 2021-08-12 DIAGNOSIS — D64.9 POSTOPERATIVE ANEMIA: ICD-10-CM

## 2021-08-12 DIAGNOSIS — R73.01 IMPAIRED FASTING GLUCOSE: Chronic | ICD-10-CM

## 2021-08-12 DIAGNOSIS — E87.1 HYPONATREMIA: ICD-10-CM

## 2021-08-12 LAB
ANION GAP SERPL CALCULATED.3IONS-SCNC: 9.8 MMOL/L (ref 5–15)
BASOPHILS # BLD AUTO: 0.08 10*3/MM3 (ref 0–0.2)
BASOPHILS NFR BLD AUTO: 0.8 % (ref 0–1.5)
BUN SERPL-MCNC: 21 MG/DL (ref 8–23)
BUN/CREAT SERPL: 42.9 (ref 7–25)
CALCIUM SPEC-SCNC: 9.6 MG/DL (ref 8.6–10.5)
CHLORIDE SERPL-SCNC: 98 MMOL/L (ref 98–107)
CO2 SERPL-SCNC: 28.2 MMOL/L (ref 22–29)
CREAT SERPL-MCNC: 0.49 MG/DL (ref 0.57–1)
DEPRECATED RDW RBC AUTO: 44.1 FL (ref 37–54)
EOSINOPHIL # BLD AUTO: 0.62 10*3/MM3 (ref 0–0.4)
EOSINOPHIL NFR BLD AUTO: 6 % (ref 0.3–6.2)
ERYTHROCYTE [DISTWIDTH] IN BLOOD BY AUTOMATED COUNT: 12.5 % (ref 12.3–15.4)
GFR SERPL CREATININE-BSD FRML MDRD: 124 ML/MIN/1.73
GLUCOSE SERPL-MCNC: 98 MG/DL (ref 65–99)
HCT VFR BLD AUTO: 35.2 % (ref 34–46.6)
HGB BLD-MCNC: 11.3 G/DL (ref 12–15.9)
IMM GRANULOCYTES # BLD AUTO: 0.17 10*3/MM3 (ref 0–0.05)
IMM GRANULOCYTES NFR BLD AUTO: 1.6 % (ref 0–0.5)
LYMPHOCYTES # BLD AUTO: 2.14 10*3/MM3 (ref 0.7–3.1)
LYMPHOCYTES NFR BLD AUTO: 20.6 % (ref 19.6–45.3)
MCH RBC QN AUTO: 31 PG (ref 26.6–33)
MCHC RBC AUTO-ENTMCNC: 32.1 G/DL (ref 31.5–35.7)
MCV RBC AUTO: 96.7 FL (ref 79–97)
MONOCYTES # BLD AUTO: 0.96 10*3/MM3 (ref 0.1–0.9)
MONOCYTES NFR BLD AUTO: 9.2 % (ref 5–12)
NEUTROPHILS NFR BLD AUTO: 6.42 10*3/MM3 (ref 1.7–7)
NEUTROPHILS NFR BLD AUTO: 61.8 % (ref 42.7–76)
NRBC BLD AUTO-RTO: 0 /100 WBC (ref 0–0.2)
PLATELET # BLD AUTO: 600 10*3/MM3 (ref 140–450)
PMV BLD AUTO: 9.5 FL (ref 6–12)
POTASSIUM SERPL-SCNC: 5.5 MMOL/L (ref 3.5–5.2)
RBC # BLD AUTO: 3.64 10*6/MM3 (ref 3.77–5.28)
SODIUM SERPL-SCNC: 136 MMOL/L (ref 136–145)
WBC # BLD AUTO: 10.39 10*3/MM3 (ref 3.4–10.8)

## 2021-08-12 PROCEDURE — 1111F DSCHRG MED/CURRENT MED MERGE: CPT | Performed by: INTERNAL MEDICINE

## 2021-08-12 PROCEDURE — 85025 COMPLETE CBC W/AUTO DIFF WBC: CPT

## 2021-08-12 PROCEDURE — 99495 TRANSJ CARE MGMT MOD F2F 14D: CPT | Performed by: INTERNAL MEDICINE

## 2021-08-12 PROCEDURE — 80048 BASIC METABOLIC PNL TOTAL CA: CPT

## 2021-08-14 DIAGNOSIS — R89.9 ABNORMAL LABORATORY TEST: Primary | ICD-10-CM

## 2021-08-14 NOTE — PROGRESS NOTES
Anemia continues to improve. Sodium back to normal but potassium too high.     Make sure not taking any potassium supplements. Repeat BMP in 1-2 wks to make sure potassium goes back to normal (nonfasting BMP and CBC)

## 2021-08-16 ENCOUNTER — TELEPHONE (OUTPATIENT)
Dept: INTERNAL MEDICINE | Facility: CLINIC | Age: 71
End: 2021-08-16

## 2021-08-16 NOTE — TELEPHONE ENCOUNTER
----- Message from Mirna Stack MD sent at 8/15/2021  8:22 PM EDT -----  Regarding: vitamin  Look on the bottle and stop if it has potassium in it. If so, hold until repeat labs  ----- Message -----  From: Gricelda Madrid MA  Sent: 8/14/2021   1:52 PM EDT  To: Mirna Stack MD    Let pt know of results. Pt verbalized understanding. Pt states she takes a multivitamin daily, but no additional potassium other than what would be in food she eats. Does she need to hold taking vitamin for now. Labs ordered

## 2021-08-24 ENCOUNTER — OFFICE VISIT (OUTPATIENT)
Dept: ORTHOPEDIC SURGERY | Facility: CLINIC | Age: 71
End: 2021-08-24

## 2021-08-24 VITALS — TEMPERATURE: 97.7 F

## 2021-08-24 DIAGNOSIS — Z98.890 STATUS POST HIP SURGERY: Primary | ICD-10-CM

## 2021-08-24 PROCEDURE — 99024 POSTOP FOLLOW-UP VISIT: CPT | Performed by: PHYSICIAN ASSISTANT

## 2021-08-24 NOTE — PROGRESS NOTES
[facility] Clinic Note      Subjective     CC: Post-op (3 week S/P Right total Hip Arthroplasty 08/02/21)      HPI    Natalia Estrada is a 70 y.o. female.  Patient presents today 3 weeks status post right total hip arthroplasty with Dr. Diana.  She complains of pain 2/10.  She is already doing outpatient physical therapy.  Taking Tylenol for pain.  Extremely happy with her outcome.    Overall, patient's symptoms are much improved    ROS:    Constiutional:Pt denies fever, chills, nausea, or vomiting.  MSK:as above        Objective      Past Medical History  Past Medical History:   Diagnosis Date   • Abdominal pain, left lower quadrant 4/29/2016    Impression: 3/2/17 normal abd CT (except mild interstitial scarring lung bases); 11/14/2014 - ddx includes colon spasm, diverticulitis, less likely reflux, gastritis, pancreatitis, GYN etiology (h/o YULIYA/BSO but note (+)FH ovarian CA); with exam findings and hx, proceed with trial of SL levsin; if no better, plan to check labs and CT for further evaluation; note last colonoscopy done 2010 (to be re   • Arthritis    • Diarrhea 04/29/2016    Impression: 05/22/2014 - reassuring that sxs come/go; discontinue stool softener until resolution of sxs; monitor for food triggers; add fiber supplement 1x/day;  consider addition of probiotic but rec 30d trial if she proceeds; f/u if sxs do not resolve;    • Hx of AAA ultrasound 02/28/2014    neg AAA ultrasound (2/28/14)   • Hx of bone density study 03/07/2014    DEXA (3/7/14): L -1.4, H -1.6   • Hx of bone density study 03/16/2017    DEXA (3/16/17) L -1.7, H -1.3, repeat 3 yrs   • Hx of bone density study 07/31/2020    DEXA (7/31/20): L -1.6, H -1.3, repeat 3 yrs   • Hx of colonoscopy 04/18/2016    colonosc (4/18/16): hyperplastic polyp, diverticulosis, repeat 10 yrs; GI - Dr. Knight   • Hx of echocardiogram 03/16/2014    ECHO (2/17): mild concentric LVH and diastolic dysfunction, mild MR/TR/AR   • Hx of pelvic  ultrasound 02/05/2020    nl pelvic u/s (2/5/20); ovaries absent   • Hx of thyroid ultrasound 01/30/2018    nl thyr u/s (1/30/18)   • OAB (overactive bladder)          Physical Exam  Temp 97.7 °F (36.5 °C)     There is no height or weight on file to calculate BMI.    Patient is well nourished and well developed.        Ortho Exam  Right hip exam: Posterior hip incision is healing well.  No pain with hip motion.  Neurovascular intact distally.  Ambulating with a cane.      Assessment    Assessment:  1. Status post hip surgery        Plan:  1. Recommend over the counter anti-inflammatories for pain and/or swelling  2. Doing well status post right total hip arthroplasty with Dr. Diana on 8/2/2021.  X-rays today show well-positioned total hip arthroplasty with no evidence of osteolysis, signs of fracture.  Patient will continue her PT.  She will return to see Dr. Diana in 3 weeks with repeat AP pelvis or sooner if needed.      Kirti Tom PA-C  08/25/21  13:01 EDT      Dragon disclaimer:  Much of this encounter note is an electronic transcription/translation of spoken language to printed text. The electronic translation of spoken language may permit erroneous, or at times, nonsensical words or phrases to be inadvertently transcribed; Although I have reviewed the note for such errors, some may still exist.      Answers for HPI/ROS submitted by the patient on 8/22/2021  Please describe your symptoms.: Post surgery check up  Have you had these symptoms before?: No  How long have you been having these symptoms?: Greater than 2 weeks  Please list any medications you are currently taking for this condition.: Nabumetone  What is the primary reason for your visit?: Other

## 2021-08-26 ENCOUNTER — LAB (OUTPATIENT)
Dept: LAB | Facility: HOSPITAL | Age: 71
End: 2021-08-26

## 2021-08-26 DIAGNOSIS — R89.9 ABNORMAL LABORATORY TEST: ICD-10-CM

## 2021-08-26 LAB
ANION GAP SERPL CALCULATED.3IONS-SCNC: 9 MMOL/L (ref 5–15)
BASOPHILS # BLD AUTO: 0.07 10*3/MM3 (ref 0–0.2)
BASOPHILS NFR BLD AUTO: 1 % (ref 0–1.5)
BUN SERPL-MCNC: 18 MG/DL (ref 8–23)
BUN/CREAT SERPL: 27.3 (ref 7–25)
CALCIUM SPEC-SCNC: 9.4 MG/DL (ref 8.6–10.5)
CHLORIDE SERPL-SCNC: 99 MMOL/L (ref 98–107)
CO2 SERPL-SCNC: 29 MMOL/L (ref 22–29)
CREAT SERPL-MCNC: 0.66 MG/DL (ref 0.57–1)
DEPRECATED RDW RBC AUTO: 41.9 FL (ref 37–54)
EOSINOPHIL # BLD AUTO: 1.31 10*3/MM3 (ref 0–0.4)
EOSINOPHIL NFR BLD AUTO: 18.1 % (ref 0.3–6.2)
ERYTHROCYTE [DISTWIDTH] IN BLOOD BY AUTOMATED COUNT: 12.2 % (ref 12.3–15.4)
GFR SERPL CREATININE-BSD FRML MDRD: 88 ML/MIN/1.73
GLUCOSE SERPL-MCNC: 81 MG/DL (ref 65–99)
HCT VFR BLD AUTO: 34.3 % (ref 34–46.6)
HGB BLD-MCNC: 11.4 G/DL (ref 12–15.9)
IMM GRANULOCYTES # BLD AUTO: 0.04 10*3/MM3 (ref 0–0.05)
IMM GRANULOCYTES NFR BLD AUTO: 0.6 % (ref 0–0.5)
LYMPHOCYTES # BLD AUTO: 1.78 10*3/MM3 (ref 0.7–3.1)
LYMPHOCYTES NFR BLD AUTO: 24.6 % (ref 19.6–45.3)
MCH RBC QN AUTO: 31.5 PG (ref 26.6–33)
MCHC RBC AUTO-ENTMCNC: 33.2 G/DL (ref 31.5–35.7)
MCV RBC AUTO: 94.8 FL (ref 79–97)
MONOCYTES # BLD AUTO: 0.7 10*3/MM3 (ref 0.1–0.9)
MONOCYTES NFR BLD AUTO: 9.7 % (ref 5–12)
NEUTROPHILS NFR BLD AUTO: 3.35 10*3/MM3 (ref 1.7–7)
NEUTROPHILS NFR BLD AUTO: 46 % (ref 42.7–76)
NRBC BLD AUTO-RTO: 0 /100 WBC (ref 0–0.2)
PLATELET # BLD AUTO: 429 10*3/MM3 (ref 140–450)
PMV BLD AUTO: 10.2 FL (ref 6–12)
POTASSIUM SERPL-SCNC: 5 MMOL/L (ref 3.5–5.2)
RBC # BLD AUTO: 3.62 10*6/MM3 (ref 3.77–5.28)
SODIUM SERPL-SCNC: 137 MMOL/L (ref 136–145)
WBC # BLD AUTO: 7.25 10*3/MM3 (ref 3.4–10.8)

## 2021-08-26 PROCEDURE — 80048 BASIC METABOLIC PNL TOTAL CA: CPT

## 2021-08-26 PROCEDURE — 85025 COMPLETE CBC W/AUTO DIFF WBC: CPT

## 2021-08-26 NOTE — PROGRESS NOTES
Electrolytes, incl kidney function, are normal. Anemia is unchanged, mild but persistent.    Rec repeat CBC in 1 month to monitor for continued improvement of anemia

## 2021-08-27 ENCOUNTER — TELEPHONE (OUTPATIENT)
Dept: INTERNAL MEDICINE | Facility: CLINIC | Age: 71
End: 2021-08-27

## 2021-08-27 DIAGNOSIS — Z96.641 STATUS POST TOTAL REPLACEMENT OF RIGHT HIP: Primary | ICD-10-CM

## 2021-08-27 NOTE — TELEPHONE ENCOUNTER
----- Message from Mirna Stack MD sent at 8/26/2021  7:36 PM EDT -----  Electrolytes, incl kidney function, are normal. Anemia is unchanged, mild but persistent.    Rec repeat CBC in 1 month to monitor for continued improvement of anemia

## 2021-09-16 ENCOUNTER — OFFICE VISIT (OUTPATIENT)
Dept: ORTHOPEDIC SURGERY | Facility: CLINIC | Age: 71
End: 2021-09-16

## 2021-09-16 DIAGNOSIS — Z98.890 STATUS POST HIP SURGERY: ICD-10-CM

## 2021-09-16 DIAGNOSIS — Z96.641 STATUS POST TOTAL REPLACEMENT OF RIGHT HIP: Primary | ICD-10-CM

## 2021-09-16 PROCEDURE — 99024 POSTOP FOLLOW-UP VISIT: CPT | Performed by: ORTHOPAEDIC SURGERY

## 2021-09-16 NOTE — PROGRESS NOTES
Orthopaedic Clinic Note: Hip post Op    Chief Complaint   Patient presents with   • Post-op Follow-up     3 week follow up - 6 weeks S/P Right total Hip Arthroplasty 08/02/21        HPI    Ms. Estrada is 6  week(s) s/p right total hip arthroplasty. She rates her pain 2/10 on the pain scale today. She is ambulating with no assistive device. She denies fevers chills or constitutional symptoms. Overall she is doing better.    Past Medical History:   Diagnosis Date   • Abdominal pain, left lower quadrant 4/29/2016    Impression: 3/2/17 normal abd CT (except mild interstitial scarring lung bases); 11/14/2014 - ddx includes colon spasm, diverticulitis, less likely reflux, gastritis, pancreatitis, GYN etiology (h/o YULIYA/BSO but note (+)FH ovarian CA); with exam findings and hx, proceed with trial of SL levsin; if no better, plan to check labs and CT for further evaluation; note last colonoscopy done 2010 (to be re   • Arthritis    • Diarrhea 04/29/2016    Impression: 05/22/2014 - reassuring that sxs come/go; discontinue stool softener until resolution of sxs; monitor for food triggers; add fiber supplement 1x/day;  consider addition of probiotic but rec 30d trial if she proceeds; f/u if sxs do not resolve;    • Hx of AAA ultrasound 02/28/2014    neg AAA ultrasound (2/28/14)   • Hx of bone density study 03/07/2014    DEXA (3/7/14): L -1.4, H -1.6   • Hx of bone density study 03/16/2017    DEXA (3/16/17) L -1.7, H -1.3, repeat 3 yrs   • Hx of bone density study 07/31/2020    DEXA (7/31/20): L -1.6, H -1.3, repeat 3 yrs   • Hx of colonoscopy 04/18/2016    colonosc (4/18/16): hyperplastic polyp, diverticulosis, repeat 10 yrs; GI - Dr. Knight   • Hx of echocardiogram 03/16/2014    ECHO (2/17): mild concentric LVH and diastolic dysfunction, mild MR/TR/AR   • Hx of pelvic ultrasound 02/05/2020    nl pelvic u/s (2/5/20); ovaries absent   • Hx of thyroid ultrasound 01/30/2018    nl thyr u/s (1/30/18)   • OAB (overactive bladder)        Past Surgical History:   Procedure Laterality Date   • BILATERAL OOPHORECTOMY  02/2003    due to (+)FH ovarian CA; GYN - Dr. Resendiz   • CATARACT EXTRACTION Bilateral    • COLONOSCOPY     • CYST REMOVAL      toe, right middle    • INCISIONAL BREAST BIOPSY      x2 (5/71 and 12/81)surg - Dr. Dawson   • TOTAL HIP ARTHROPLASTY Right 8/2/2021    Procedure: TOTAL HIP ARTHROPLASTY RIGHT;  Surgeon: Edwardo Diana MD;  Location[facility];  Service: Orthopedics;  Laterality: Right;     Family History   Problem Relation Age of Onset   • Goiter Sister    • Heart attack Sister         tob   • Stroke Sister    • Alpha-1 antitrypsin deficiency Sister         carrier   • Arthritis Sister    • Hyperlipidemia Sister    • Thyroid nodules Sister    • Rheum arthritis Sister    • Alpha-1 antitrypsin deficiency Sister         carrier   • Ovarian cancer Mother    • Arthritis Mother    • Ovarian cancer Sister    • Hypothyroidism Sister    • Alpha-1 antitrypsin deficiency Grandchild         carrier   • Heart attack Brother    • Breast cancer Sister       Social History     Socioeconomic History   • Marital status:      Spouse name: Not on file   • Number of children: Not on file   • Years of education: Not on file   • Highest education level: Not on file   Tobacco Use   • Smoking status: Never Smoker   • Smokeless tobacco: Never Used   Vaping Use   • Vaping Use: Never used   Substance and Sexual Activity   • Alcohol use: Never   • Drug use: Never   • Sexual activity: Defer     Comment: spouse      Current Outpatient Medications on File Prior to Visit   Medication Sig Dispense Refill   • aspirin (ASPIR) 81 MG EC tablet Take 1 tablet by mouth 2 (Two) Times a Day. 60 tablet 0   • Cholecalciferol (VITAMIN D) 1000 UNITS tablet Take 1,000 Units by mouth Daily.     • Multiple Vitamin (MULTI VITAMIN DAILY PO) Take 1 tablet by mouth Daily.     • nabumetone (RELAFEN) 750 MG tablet Take 1 tablet by mouth 2 (Two) Times a Day. Resume  after finishing Mobic script     • ondansetron (Zofran) 4 MG tablet Take 1 tablet by mouth Every 8 (Eight) Hours As Needed for Nausea or Vomiting for up to 20 doses. 20 tablet 0   • oxyCODONE (Roxicodone) 5 MG immediate release tablet Take 1 tablet by mouth Every 4 (Four) Hours As Needed for Moderate Pain . 40 tablet 0   • psyllium (METAMUCIL) 0.52 G capsule Take 1 capsule by mouth Daily As Needed for Constipation.     • tolterodine LA (DETROL LA) 4 MG 24 hr capsule Take 1 capsule by mouth Daily. (Patient taking differently: Take 4 mg by mouth Daily As Needed (over active bladder, only takes when traveling).) 90 capsule 3     No current facility-administered medications on file prior to visit.      Allergies   Allergen Reactions   • Erythromycin Other (See Comments)     Chest pain   • Sulfa Antibiotics Rash        Review of Systems     Physical Exam  There were no vitals taken for this visit.    There is no height or weight on file to calculate BMI.    GENERAL APPEARANCE: awake, alert, oriented, in no acute distress and well developed, well nourished  LUNGS:  breathing nonlabored  EXTREMITIES: no clubbing, cyanosis  PERIPHERAL PULSES: palpable dorsalis pedis and posterior tibial pulses bilaterally.    GAIT:  Normal          Right hip exam:  RANGE OF MOTION:   FLEXION CONTRACTURE: None   FLEXION: 110 degrees   INTERNAL ROTATION: 20 degrees at 90 degrees of flexion   EXTERNAL ROTATION: 40 degrees at 90 degrees of flexion    PAIN WITH HIP MOTION: no  PAIN WITH LOGROLL: no  STINCHFIELD TEST: negative    STRENGTH:  5/5 hip adduction, abduction, flexion. 5/5 strength knee flexion, extension. 5/5 strength ankle dorsiflexion and plantarflexion.     GREATER TROCHANTER BURSAL PAIN:  no     REFLEXES:   PATELLAR 2+/4   ACHILLES 2+/4    CLONUS: negative  STRAIGHT LEG TEST:   negative    SENSATION TO LIGHT TOUCH:  DEEP PERONEAL/SUPERFICIAL PERONEAL/SURAL/SAPHENOUS/TIBIAL:  intact    EDEMA:   no  ERYTHEMA:  no  WOUNDS/INCISIONS:  no overlying skin problems. Well-healed lateral based incision  _____________________________________________________________________  _____________________________________________________________________    RADIOGRAPHIC FINDINGS:   Indication: Status post right total hip arthroplasty    Comparison: Todays xrays were compared to previous xrays from 8/24/21    AP pelvis, right hip: Demonstrate a well positioned total hip without evidence of wear, loosening, fracture or osteolysis, femoral head is concentrically reduced within the acetabulum and No significant changes compared to prior radiographs.    Assessment/Plan:   Diagnosis Plan   1. Status post total replacement of right hip     2. Status post hip surgery  XR Pelvis 1 or 2 View     Patient doing well 6-week status post right total hip arthroplasty. I recommend continued activity as tolerated. She may relax her hip precautions. I will see her back in 2 months for repeat assessment x-ray P pelvis on return. She is welcome to follow-up sooner should problems arise.    Edwardo Diana MD  09/16/21  09:01 EDT

## 2021-10-04 ENCOUNTER — LAB (OUTPATIENT)
Dept: LAB | Facility: HOSPITAL | Age: 71
End: 2021-10-04

## 2021-10-04 LAB
BASOPHILS # BLD AUTO: 0.07 10*3/MM3 (ref 0–0.2)
BASOPHILS NFR BLD AUTO: 1.2 % (ref 0–1.5)
DEPRECATED RDW RBC AUTO: 41.2 FL (ref 37–54)
EOSINOPHIL # BLD AUTO: 0.33 10*3/MM3 (ref 0–0.4)
EOSINOPHIL NFR BLD AUTO: 5.9 % (ref 0.3–6.2)
ERYTHROCYTE [DISTWIDTH] IN BLOOD BY AUTOMATED COUNT: 12.4 % (ref 12.3–15.4)
HCT VFR BLD AUTO: 35.9 % (ref 34–46.6)
HGB BLD-MCNC: 12.1 G/DL (ref 12–15.9)
IMM GRANULOCYTES # BLD AUTO: 0.02 10*3/MM3 (ref 0–0.05)
IMM GRANULOCYTES NFR BLD AUTO: 0.4 % (ref 0–0.5)
LYMPHOCYTES # BLD AUTO: 2.35 10*3/MM3 (ref 0.7–3.1)
LYMPHOCYTES NFR BLD AUTO: 41.8 % (ref 19.6–45.3)
MCH RBC QN AUTO: 31.2 PG (ref 26.6–33)
MCHC RBC AUTO-ENTMCNC: 33.7 G/DL (ref 31.5–35.7)
MCV RBC AUTO: 92.5 FL (ref 79–97)
MONOCYTES # BLD AUTO: 0.61 10*3/MM3 (ref 0.1–0.9)
MONOCYTES NFR BLD AUTO: 10.9 % (ref 5–12)
NEUTROPHILS NFR BLD AUTO: 2.24 10*3/MM3 (ref 1.7–7)
NEUTROPHILS NFR BLD AUTO: 39.8 % (ref 42.7–76)
NRBC BLD AUTO-RTO: 0 /100 WBC (ref 0–0.2)
PLATELET # BLD AUTO: 318 10*3/MM3 (ref 140–450)
PMV BLD AUTO: 10.3 FL (ref 6–12)
RBC # BLD AUTO: 3.88 10*6/MM3 (ref 3.77–5.28)
WBC # BLD AUTO: 5.62 10*3/MM3 (ref 3.4–10.8)

## 2021-10-04 PROCEDURE — 85025 COMPLETE CBC W/AUTO DIFF WBC: CPT | Performed by: INTERNAL MEDICINE

## 2021-10-27 NOTE — PROGRESS NOTES
"Chief Complaint   Patient presents with   • Impaired Fasting Glucose   • Hyperlipidemia       History of Present Illness  71 y.o.  woman presents for f/u on sugars and anemia. Reports persistent on/off discomfort in left abdomen, sometimes symptomatic when she takes a deep breath. Not affected by eating or BMs; ongoing for > 6 months. Has occ loose stools but also has normal formed stools; no blood in stools. BMs are regular. No assoc'd n/v or other triggers or assoc'd sxs.    Review of Systems  Denies CP, palpitations, SOB, falls. ROS (+) for right low back/waist area and left abd pain as noted. No diarrhea, melena, or hematochezia.  All other ROS reviewed and negative.      Current Outpatient Medications:   •  aspirin (ASPIR) 81 MG QD  •  Cholecalciferol (VITAMIN D) 1000 UNITS QD  •  Multiple Vitamin QD  •  nabumetone (RELAFEN) 750 MG BID prn:   •  ondansetron (Zofran) 4 MGprn  •  psyllium (METAMUCIL) 0.52 G prn  •  tolterodine LA (DETROL LA) 4 MG QD      VITALS:  /72   Pulse 72   Ht 160 cm (63\")   Wt 64.9 kg (143 lb)   SpO2 96%   BMI 25.33 kg/m²     Physical Exam  Vitals and nursing note reviewed.   Constitutional:       General: She is not in acute distress.     Appearance: Normal appearance. She is not ill-appearing.   Eyes:      Extraocular Movements: Extraocular movements intact.      Conjunctiva/sclera: Conjunctivae normal.   Pulmonary:      Effort: Pulmonary effort is normal. No respiratory distress.   Neurological:      Mental Status: She is alert and oriented to person, place, and time. Mental status is at baseline.   Psychiatric:         Mood and Affect: Mood normal.         Behavior: Behavior normal.         LABS  Results for orders placed or performed in visit on 10/28/21   POC Glycosylated Hemoglobin (Hb A1C)    Specimen: Blood   Result Value Ref Range    Hemoglobin A1C 5.9 %    Lot Number [id number]     Expiration Date 05/19/23      Results for orders placed or performed in visit " on 08/27/21   CBC Auto Differential    Specimen: Blood   Result Value Ref Range    WBC 5.62 3.40 - 10.80 10*3/mm3    RBC 3.88 3.77 - 5.28 10*6/mm3    Hemoglobin 12.1 12.0 - 15.9 g/dL    Hematocrit 35.9 34.0 - 46.6 %    MCV 92.5 79.0 - 97.0 fL    MCH 31.2 26.6 - 33.0 pg    MCHC 33.7 31.5 - 35.7 g/dL    RDW 12.4 12.3 - 15.4 %    RDW-SD 41.2 37.0 - 54.0 fl    MPV 10.3 6.0 - 12.0 fL    Platelets 318 140 - 450 10*3/mm3    Neutrophil % 39.8 (L) 42.7 - 76.0 %    Lymphocyte % 41.8 19.6 - 45.3 %    Monocyte % 10.9 5.0 - 12.0 %    Eosinophil % 5.9 0.3 - 6.2 %    Basophil % 1.2 0.0 - 1.5 %    Immature Grans % 0.4 0.0 - 0.5 %    Neutrophils, Absolute 2.24 1.70 - 7.00 10*3/mm3    Lymphocytes, Absolute 2.35 0.70 - 3.10 10*3/mm3    Monocytes, Absolute 0.61 0.10 - 0.90 10*3/mm3    Eosinophils, Absolute 0.33 0.00 - 0.40 10*3/mm3    Basophils, Absolute 0.07 0.00 - 0.20 10*3/mm3    Immature Grans, Absolute 0.02 0.00 - 0.05 10*3/mm3    nRBC 0.0 0.0 - 0.2 /100 WBC     7/21 A1C 6.1    ASSESSMENT/PLAN    Diagnoses and all orders for this visit:    1. Impaired fasting glucose (Primary)  Assessment & Plan:  BG control stable with A1C 5.9; encouraged reg phys activity to decr insulin resistance, moderation in unhealthy starches/sweets; f/u A1C in 6 mos      Orders:  -     POC Glycosylated Hemoglobin (Hb A1C)    2. Postoperative anemia  Comments:  bld counts stable, at baseline    3. Screening for colon cancer  -     Cologuard - Stool, Per Rectum; Future    4. Left lateral abdominal pain  Comments:  and side and back pain, ongoing for months; no new bowel changes; patient wishes to check Cologuard, last colonosc 2016      FOLLOW-UP  1. Health maintenance - COVID19 vacc done, including 3rd dose Pfizer; flu vacc done 9/21  2. RTC for next wellness 3/17/22; fasting labs prior to appt (CBC, CMP, TSH, lipids, UA/micro, A1C, FT4)    Electronically signed by:    Mirna Stack MD, FACP  10/28/2021

## 2021-10-28 ENCOUNTER — OFFICE VISIT (OUTPATIENT)
Dept: INTERNAL MEDICINE | Facility: CLINIC | Age: 71
End: 2021-10-28

## 2021-10-28 VITALS
SYSTOLIC BLOOD PRESSURE: 124 MMHG | WEIGHT: 143 LBS | BODY MASS INDEX: 25.34 KG/M2 | HEIGHT: 63 IN | HEART RATE: 72 BPM | DIASTOLIC BLOOD PRESSURE: 72 MMHG | OXYGEN SATURATION: 96 %

## 2021-10-28 DIAGNOSIS — Z12.11 SCREENING FOR COLON CANCER: ICD-10-CM

## 2021-10-28 DIAGNOSIS — R10.9 LEFT LATERAL ABDOMINAL PAIN: ICD-10-CM

## 2021-10-28 DIAGNOSIS — D64.9 POSTOPERATIVE ANEMIA: ICD-10-CM

## 2021-10-28 DIAGNOSIS — R73.01 IMPAIRED FASTING GLUCOSE: Primary | Chronic | ICD-10-CM

## 2021-10-28 LAB
EXPIRATION DATE: NORMAL
HBA1C MFR BLD: 5.9 %
Lab: NORMAL

## 2021-10-28 PROCEDURE — 3044F HG A1C LEVEL LT 7.0%: CPT | Performed by: INTERNAL MEDICINE

## 2021-10-28 PROCEDURE — 83036 HEMOGLOBIN GLYCOSYLATED A1C: CPT | Performed by: INTERNAL MEDICINE

## 2021-10-28 PROCEDURE — 99214 OFFICE O/P EST MOD 30 MIN: CPT | Performed by: INTERNAL MEDICINE

## 2021-11-16 ENCOUNTER — OFFICE VISIT (OUTPATIENT)
Dept: ORTHOPEDIC SURGERY | Facility: CLINIC | Age: 71
End: 2021-11-16

## 2021-11-16 VITALS
HEIGHT: 63 IN | BODY MASS INDEX: 25.35 KG/M2 | WEIGHT: 143.08 LBS | DIASTOLIC BLOOD PRESSURE: 88 MMHG | SYSTOLIC BLOOD PRESSURE: 136 MMHG

## 2021-11-16 DIAGNOSIS — Z98.890 STATUS POST HIP SURGERY: Primary | ICD-10-CM

## 2021-11-16 PROCEDURE — 99212 OFFICE O/P EST SF 10 MIN: CPT | Performed by: ORTHOPAEDIC SURGERY

## 2021-11-16 NOTE — PROGRESS NOTES
Orthopaedic Clinic Note:  Hip Post Op    Chief Complaint   Patient presents with   • Follow-up     2 month follow up - 3.5 months S/P Right total Hip Arthroplasty 08/02/21        HPI    Ms. Estrada is 3.5  month(s) s/p right total hip arthroplasty.  She comes to clinic today rating her pain a 1/10 on the pain scale.  She is ambulatory with no assistive device.  Denies fevers chills or constitutional symptoms.  Overall she is doing well.  Majority of her pain is localized laterally at the abductor musculature.    Past Medical History:   Diagnosis Date   • Abdominal pain, left lower quadrant 4/29/2016    Impression: 3/2/17 normal abd CT (except mild interstitial scarring lung bases); 11/14/2014 - ddx includes colon spasm, diverticulitis, less likely reflux, gastritis, pancreatitis, GYN etiology (h/o YULIYA/BSO but note (+)FH ovarian CA); with exam findings and hx, proceed with trial of SL levsin; if no better, plan to check labs and CT for further evaluation; note last colonoscopy done 2010 (to be re   • Diarrhea 04/29/2016    Impression: 05/22/2014 - reassuring that sxs come/go; discontinue stool softener until resolution of sxs; monitor for food triggers; add fiber supplement 1x/day;  consider addition of probiotic but rec 30d trial if she proceeds; f/u if sxs do not resolve;    • Hx of AAA ultrasound 02/28/2014    neg AAA ultrasound (2/28/14)   • Hx of bone density study 03/07/2014    DEXA (3/7/14): L -1.4, H -1.6   • Hx of bone density study 03/16/2017    DEXA (3/16/17) L -1.7, H -1.3, repeat 3 yrs   • Hx of bone density study 07/31/2020    DEXA (7/31/20): L -1.6, H -1.3, repeat 3 yrs   • Hx of colonoscopy 04/18/2016    colonosc (4/18/16): hyperplastic polyp, diverticulosis, repeat 10 yrs; GI - Dr. Knight   • Hx of echocardiogram 03/16/2014    ECHO (2/17): mild concentric LVH and diastolic dysfunction, mild MR/TR/AR   • Hx of pelvic ultrasound 02/05/2020    nl pelvic u/s (2/5/20); ovaries absent   • Hx of thyroid  ultrasound 01/30/2018    nl thyr u/s (1/30/18)   • OAB (overactive bladder)       Past Surgical History:   Procedure Laterality Date   • BILATERAL OOPHORECTOMY  02/2003    due to (+)FH ovarian CA; GYN - Dr. Resendiz   • CATARACT EXTRACTION Bilateral    • CYST REMOVAL      toe, right middle    • INCISIONAL BREAST BIOPSY      x2 (5/71 and 12/81)surg - Dr. Dawson   • TOTAL HIP ARTHROPLASTY Right 8/2/2021    Procedure: TOTAL HIP ARTHROPLASTY RIGHT;  Surgeon: Edwardo Diana MD;  Location: [facility];  Service: Orthopedics;  Laterality: Right;      Family History   Problem Relation Age of Onset   • Goiter Sister    • Heart attack Sister         tob   • Stroke Sister    • Alpha-1 antitrypsin deficiency Sister         carrier   • Arthritis Sister    • Hyperlipidemia Sister    • Thyroid nodules Sister    • Rheum arthritis Sister    • Alpha-1 antitrypsin deficiency Sister         carrier   • Ovarian cancer Mother    • Arthritis Mother    • Ovarian cancer Sister    • Hypothyroidism Sister    • Alpha-1 antitrypsin deficiency Grandchild         carrier   • Heart attack Brother    • Breast cancer Sister      Social History     Socioeconomic History   • Marital status:    Tobacco Use   • Smoking status: Never Smoker   • Smokeless tobacco: Never Used   Vaping Use   • Vaping Use: Never used   Substance and Sexual Activity   • Alcohol use: Never   • Drug use: Never   • Sexual activity: Defer     Comment: spouse      Current Outpatient Medications on File Prior to Visit   Medication Sig Dispense Refill   • Cholecalciferol (VITAMIN D) 1000 UNITS tablet Take 1,000 Units by mouth Daily.     • Multiple Vitamin (MULTI VITAMIN DAILY PO) Take 1 tablet by mouth Daily.     • nabumetone (RELAFEN) 750 MG tablet Take 1 tablet by mouth 2 (Two) Times a Day. Resume after finishing Mobic script     • ondansetron (Zofran) 4 MG tablet Take 1 tablet by mouth Every 8 (Eight) Hours As Needed for Nausea or Vomiting for up to 20 doses. 20 tablet  "0   • psyllium (METAMUCIL) 0.52 G capsule Take 1 capsule by mouth Daily As Needed for Constipation.     • tolterodine LA (DETROL LA) 4 MG 24 hr capsule Take 1 capsule by mouth Daily. (Patient taking differently: Take 4 mg by mouth Daily As Needed (over active bladder, only takes when traveling).) 90 capsule 3     No current facility-administered medications on file prior to visit.      Allergies   Allergen Reactions   • Erythromycin Other (See Comments)     Chest pain   • Sulfa Antibiotics Rash        Review of Systems   Constitutional: Negative.    HENT: Negative.    Eyes: Negative.    Respiratory: Negative.    Cardiovascular: Negative.    Gastrointestinal: Negative.    Endocrine: Negative.    Genitourinary: Negative.    Musculoskeletal: Positive for arthralgias.   Skin: Negative.    Allergic/Immunologic: Negative.    Neurological: Negative.    Hematological: Negative.    Psychiatric/Behavioral: Negative.         Physical Exam  Blood pressure 136/88, height 160 cm (62.99\"), weight 64.9 kg (143 lb 1.3 oz).    Body mass index is 25.35 kg/m².    GENERAL APPEARANCE: awake, alert, oriented, in no acute distress and well developed, well nourished  LUNGS:  breathing nonlabored  EXTREMITIES: no clubbing, cyanosis  PERIPHERAL PULSES: palpable dorsalis pedis and posterior tibial pulses bilaterally.    GAIT:  Normal            Hip Exam:  Right    RANGE OF MOTION:  EXTENSION/FLEXION:  normal (0-110 degrees)  IR:  20  ER:  35  PAIN WITH HIP MOTION:  no  PAIN WITH LOGROLL:  no     STRENGTH:  ABDUCTOR:  5/5  ADDUCTOR:  5/5  HIP FLEXION:  5/5    GREATER TROCHANTER BURSAL PAIN:  yes    SENSATION TO LIGHT TOUCH:  DEEP PERONEAL/SUPERFICIAL PERONEAL/SURAL/SAPHENOUS/TIBIAL:   intact    EDEMA:  no  ERYTHEMA:  no  WOUNDS/INCISIONS:   yes, well healed surgical incision without evidence of erythema or " drainage  _______________________________________________________________  _______________________________________________________________    RADIOGRAPHIC FINDINGS:   Indication: Status post right total hip arthroplasty    Comparison: Todays xrays were compared to previous xrays from 9/16/2021    AP pelvis: Right: Demonstrate a well positioned total hip without evidence of wear, loosening, fracture or osteolysis, femoral head is concentrically reduced within the acetabulum and No significant changes compared to prior radiographs.;Left: moderate to severe joint space narrowing,  there are marginal osteophytes visualized at the femoral head-neck junction and acetabular margins and No significant changes compared to prior radiographs.        Assessment/Plan:   Diagnosis Plan   1. Status post hip surgery  XR Pelvis 1 or 2 View     Patient is doing well 3 and half month status post right total hip arthroplasty.  I recommend continued activity as tolerated without restrictions.  I will see the patient back in 9 months for repeat assessment x-ray AP pelvis on return.  Patient is welcome follow-up sooner should problems arise.    Edwardo Diana MD  11/16/21  08:56 EST

## 2021-11-19 ENCOUNTER — TELEPHONE (OUTPATIENT)
Dept: INTERNAL MEDICINE | Facility: CLINIC | Age: 71
End: 2021-11-19

## 2021-11-19 NOTE — TELEPHONE ENCOUNTER
----- Message from Mirna Stack MD sent at 11/18/2021 11:20 PM EST -----  Regarding: test results  Cologuard, stool test screening for colon cancer DNA, is negative.  Can be repeated in 3 years

## 2021-11-20 DIAGNOSIS — Z12.11 SCREENING FOR COLON CANCER: ICD-10-CM

## 2021-12-08 PROBLEM — Z00.00 MEDICARE ANNUAL WELLNESS VISIT, SUBSEQUENT: Chronic | Status: ACTIVE | Noted: 2017-02-23

## 2021-12-09 ENCOUNTER — OFFICE VISIT (OUTPATIENT)
Dept: INTERNAL MEDICINE | Facility: CLINIC | Age: 71
End: 2021-12-09

## 2021-12-09 ENCOUNTER — LAB (OUTPATIENT)
Dept: LAB | Facility: HOSPITAL | Age: 71
End: 2021-12-09

## 2021-12-09 ENCOUNTER — HOSPITAL ENCOUNTER (OUTPATIENT)
Dept: GENERAL RADIOLOGY | Facility: HOSPITAL | Age: 71
Discharge: HOME | End: 2021-12-09
Admitting: INTERNAL MEDICINE

## 2021-12-09 VITALS
WEIGHT: 143 LBS | HEART RATE: 67 BPM | DIASTOLIC BLOOD PRESSURE: 72 MMHG | OXYGEN SATURATION: 100 % | BODY MASS INDEX: 25.34 KG/M2 | HEIGHT: 63 IN | SYSTOLIC BLOOD PRESSURE: 118 MMHG

## 2021-12-09 DIAGNOSIS — R10.9 LEFT LATERAL ABDOMINAL PAIN: ICD-10-CM

## 2021-12-09 DIAGNOSIS — R07.89 LEFT-SIDED CHEST WALL PAIN: ICD-10-CM

## 2021-12-09 DIAGNOSIS — R10.9 LEFT LATERAL ABDOMINAL PAIN: Primary | ICD-10-CM

## 2021-12-09 DIAGNOSIS — K21.9 GASTROESOPHAGEAL REFLUX DISEASE, UNSPECIFIED WHETHER ESOPHAGITIS PRESENT: Chronic | ICD-10-CM

## 2021-12-09 LAB
ALBUMIN SERPL-MCNC: 4.5 G/DL (ref 3.5–5.2)
ALBUMIN/GLOB SERPL: 1.7 G/DL
ALP SERPL-CCNC: 72 U/L (ref 39–117)
ALT SERPL W P-5'-P-CCNC: 18 U/L (ref 1–33)
AMYLASE SERPL-CCNC: 74 U/L (ref 28–100)
ANION GAP SERPL CALCULATED.3IONS-SCNC: 9.6 MMOL/L (ref 5–15)
AST SERPL-CCNC: 17 U/L (ref 1–32)
BASOPHILS # BLD AUTO: 0.08 10*3/MM3 (ref 0–0.2)
BASOPHILS NFR BLD AUTO: 1.4 % (ref 0–1.5)
BILIRUB SERPL-MCNC: 0.3 MG/DL (ref 0–1.2)
BUN SERPL-MCNC: 13 MG/DL (ref 8–23)
BUN/CREAT SERPL: 25.5 (ref 7–25)
CALCIUM SPEC-SCNC: 9.8 MG/DL (ref 8.6–10.5)
CHLORIDE SERPL-SCNC: 99 MMOL/L (ref 98–107)
CO2 SERPL-SCNC: 28.4 MMOL/L (ref 22–29)
CREAT SERPL-MCNC: 0.51 MG/DL (ref 0.57–1)
CRP SERPL-MCNC: <0.3 MG/DL (ref 0–0.5)
DEPRECATED RDW RBC AUTO: 45.1 FL (ref 37–54)
EOSINOPHIL # BLD AUTO: 0.24 10*3/MM3 (ref 0–0.4)
EOSINOPHIL NFR BLD AUTO: 4.2 % (ref 0.3–6.2)
ERYTHROCYTE [DISTWIDTH] IN BLOOD BY AUTOMATED COUNT: 12.9 % (ref 12.3–15.4)
ERYTHROCYTE [SEDIMENTATION RATE] IN BLOOD: 15 MM/HR (ref 0–30)
GFR SERPL CREATININE-BSD FRML MDRD: 118 ML/MIN/1.73
GLOBULIN UR ELPH-MCNC: 2.7 GM/DL
GLUCOSE SERPL-MCNC: 106 MG/DL (ref 65–99)
HCT VFR BLD AUTO: 39.3 % (ref 34–46.6)
HGB BLD-MCNC: 12.8 G/DL (ref 12–15.9)
IMM GRANULOCYTES # BLD AUTO: 0.02 10*3/MM3 (ref 0–0.05)
IMM GRANULOCYTES NFR BLD AUTO: 0.4 % (ref 0–0.5)
LIPASE SERPL-CCNC: 41 U/L (ref 13–60)
LYMPHOCYTES # BLD AUTO: 1.76 10*3/MM3 (ref 0.7–3.1)
LYMPHOCYTES NFR BLD AUTO: 31.2 % (ref 19.6–45.3)
MCH RBC QN AUTO: 30.5 PG (ref 26.6–33)
MCHC RBC AUTO-ENTMCNC: 32.6 G/DL (ref 31.5–35.7)
MCV RBC AUTO: 93.8 FL (ref 79–97)
MONOCYTES # BLD AUTO: 0.65 10*3/MM3 (ref 0.1–0.9)
MONOCYTES NFR BLD AUTO: 11.5 % (ref 5–12)
NEUTROPHILS NFR BLD AUTO: 2.9 10*3/MM3 (ref 1.7–7)
NEUTROPHILS NFR BLD AUTO: 51.3 % (ref 42.7–76)
NRBC BLD AUTO-RTO: 0 /100 WBC (ref 0–0.2)
PLATELET # BLD AUTO: 327 10*3/MM3 (ref 140–450)
PMV BLD AUTO: 10.5 FL (ref 6–12)
POTASSIUM SERPL-SCNC: 4.3 MMOL/L (ref 3.5–5.2)
PROT SERPL-MCNC: 7.2 G/DL (ref 6–8.5)
RBC # BLD AUTO: 4.19 10*6/MM3 (ref 3.77–5.28)
SODIUM SERPL-SCNC: 137 MMOL/L (ref 136–145)
WBC NRBC COR # BLD: 5.65 10*3/MM3 (ref 3.4–10.8)

## 2021-12-09 PROCEDURE — 85025 COMPLETE CBC W/AUTO DIFF WBC: CPT

## 2021-12-09 PROCEDURE — 71046 X-RAY EXAM CHEST 2 VIEWS: CPT

## 2021-12-09 PROCEDURE — 82150 ASSAY OF AMYLASE: CPT

## 2021-12-09 PROCEDURE — 80053 COMPREHEN METABOLIC PANEL: CPT

## 2021-12-09 PROCEDURE — 85652 RBC SED RATE AUTOMATED: CPT

## 2021-12-09 PROCEDURE — 99214 OFFICE O/P EST MOD 30 MIN: CPT | Performed by: INTERNAL MEDICINE

## 2021-12-09 PROCEDURE — 86140 C-REACTIVE PROTEIN: CPT

## 2021-12-09 PROCEDURE — 83690 ASSAY OF LIPASE: CPT

## 2021-12-09 NOTE — PROGRESS NOTES
"Chief Complaint   Patient presents with   • Abdominal Pain       History of Present Illness  71 y.o.  woman presents for further evaluation of left-sided abd pain. Reports on/off for years but more persistent since 9/2021. Reports left waist area achy pain, now radiating to the left lower ribs. Is fairly persistent but waxes/wanes in intensity.    Nothing makes it better or worsened. It is not affected by eating or BMs; BMs are stable; no diarrhea or constipation, no blood in stools. Can feel it more with bending over and with taking deep breath. Denies cough, fevers/chills, difficulty in breathing. Has not found a sore spot to touch.    Started taking culturelle pre/probiotic about 1 month ago; did not make difference.    Has not been taking nabumetone regularly.    Review of Systems  ROS (+) for left abd pain as noted. Also occ gnawing sensation in the abdomen, not necessarily coinciding with the left abd pain. No n/v/d or constipation. No fevers/chills. Pain is not reproducible with palpation but sometimes worsened with certain movements. States pain is always there but waxes/wanes in intensity. All other ROS reviewed and negative.      Current Outpatient Medications:   •  Cholecalciferol (VITAMIN D) 1000 UNITS QD  •  Multiple Vitamin QD  •  nabumetone (RELAFEN) 750 MG BID prn  •  ondansetron (Zofran) 4 MG prn  •  psyllium (METAMUCIL) 0.52 G QD prn  •  tolterodine LA (DETROL LA) 4 MG QD      VITALS:  /72   Pulse 67   Ht 160 cm (63\")   Wt 64.9 kg (143 lb)   SpO2 100%   BMI 25.33 kg/m²     Physical Exam  Vitals and nursing note reviewed.   Constitutional:       General: She is not in acute distress.     Appearance: Normal appearance. She is not ill-appearing.   Eyes:      Extraocular Movements: Extraocular movements intact.      Conjunctiva/sclera: Conjunctivae normal.   Cardiovascular:      Rate and Rhythm: Normal rate and regular rhythm.      Heart sounds: Normal heart sounds.   Pulmonary:     "  Effort: Pulmonary effort is normal. No respiratory distress.      Breath sounds: Normal breath sounds. No wheezing or rales.   Chest:      Chest wall: No tenderness (no tend with palpation of the left rib edge).   Abdominal:      General: Abdomen is flat. Bowel sounds are normal. There is no distension.      Tenderness: There is no abdominal tenderness (no reproducible tenderness with palpation but it is located in the left flank area). There is no right CVA tenderness, left CVA tenderness or guarding. Negative signs include Forrester's sign.   Neurological:      Mental Status: She is alert and oriented to person, place, and time. Mental status is at baseline.      Gait: Gait normal.   Psychiatric:         Mood and Affect: Mood normal.         Behavior: Behavior normal.         LABS  Results for orders placed or performed in visit on 10/28/21   POC Glycosylated Hemoglobin (Hb A1C)    Specimen: Blood   Result Value Ref Range    Hemoglobin A1C 5.9 %    Lot Number [id number]     Expiration Date 05/19/23 11/8/21 [facility]    ASSESSMENT/PLAN    Diagnoses and all orders for this visit:    1. Left lateral abdominal pain (Primary)  Comments:  left ant and left flank pain; nonspecific ongoing for 5 yrs but not persistent; no wt loss; NEG cologuard 11/21; check labs and CT abd/pelvis  Orders:  -     CBC & Differential; Future  -     Comprehensive Metabolic Panel; Future  -     Lipase; Future  -     Amylase; Future  -     Sedimentation Rate; Future  -     C-reactive Protein; Future    2. Left-sided chest wall pain  Comments:  pain at the left lower anterior ribs, not reproducible to palpation; ddx costochondritis; check CXR, ESR, CRP  Orders:  -     XR Chest PA & Lateral; Future    3. Gastroesophageal reflux disease, unspecified whether esophagitis present  Assessment & Plan:  Complains of gnawing abd pain, discussed ddx gastritis, esophagitis; rec 2 wk trial pepcid 20mg BID        FOLLOW-UP  1. Health maintenance -  COVID19 vacc done, incl 3rd dose Pfizer; flu vacc 9/21; NEG cologuard 11/21  2. RTC for next wellness 3/17/22; fasting labs prior to appt (CBC, CMP, TSH, lipids, UA/micro, A1C, FT4)    Electronically signed by:    Mirna Stack MD, FACP  12/09/2021

## 2021-12-10 NOTE — ASSESSMENT & PLAN NOTE
Complains of gnawing abd pain, discussed ddx gastritis, esophagitis; rec 2 wk trial pepcid 20mg BID

## 2022-01-03 ENCOUNTER — HOSPITAL ENCOUNTER (OUTPATIENT)
Dept: CT IMAGING | Facility: HOSPITAL | Age: 72
Discharge: HOME | End: 2022-01-03
Admitting: INTERNAL MEDICINE

## 2022-01-03 DIAGNOSIS — R10.9 LEFT LATERAL ABDOMINAL PAIN: ICD-10-CM

## 2022-01-03 PROCEDURE — 25010000002 IOPAMIDOL 61 % SOLUTION: Performed by: INTERNAL MEDICINE

## 2022-01-03 PROCEDURE — 74178 CT ABD&PLV WO CNTR FLWD CNTR: CPT

## 2022-01-03 RX ADMIN — IOPAMIDOL 85 ML: 612 INJECTION, SOLUTION INTRAVENOUS at 09:48

## 2022-01-05 NOTE — PROGRESS NOTES
"Chief Complaint   Patient presents with   • Abdominal Pain     Left side, go over CT results       History of Present Illness  71 y.o.  woman presents for f/u on left-sided abd pain. Still has no assoc'd sxs, no assoc'd triggers, and no association with eating. Notes remote h/o taking med under her tongue for spasms but can't remember whether this helped. No assoc'd n/v/d or blood in stools. Admits to role of stress.    Review of Systems  ROS (+) for chronic (> 5 years) left abd pain as noted. All other ROS reviewed and negative.    Current Outpatient Medications:   •  Cholecalciferol (VITAMIN D) 1000 UNITS QD  •  Multiple Vitamin QD  •  nabumetone (RELAFEN) 750 MG BID  •  ondansetron (Zofran) 4 MG q8 prn  •  psyllium (METAMUCIL) 0.52 G QD  •  tolterodine LA (DETROL LA) 4 MG QD (prn per pt)    VITALS:  /74   Pulse 69   Ht 160 cm (63\")   Wt 65.3 kg (144 lb)   SpO2 98%   BMI 25.51 kg/m²     Physical Exam  Vitals and nursing note reviewed.   Constitutional:       General: She is not in acute distress.     Appearance: Normal appearance. She is not ill-appearing.   Eyes:      Extraocular Movements: Extraocular movements intact.      Conjunctiva/sclera: Conjunctivae normal.   Pulmonary:      Effort: Pulmonary effort is normal. No respiratory distress.   Abdominal:      General: Abdomen is flat. Bowel sounds are normal. There is no distension.      Palpations: Abdomen is soft.      Tenderness: There is no abdominal tenderness.   Neurological:      Mental Status: She is alert and oriented to person, place, and time. Mental status is at baseline.   Psychiatric:         Mood and Affect: Mood normal.         Behavior: Behavior normal.         LABS  Results for orders placed or performed in visit on 12/09/21   Comprehensive Metabolic Panel    Specimen: Blood   Result Value Ref Range    Glucose 106 (H) 65 - 99 mg/dL    BUN 13 8 - 23 mg/dL    Creatinine 0.51 (L) 0.57 - 1.00 mg/dL    Sodium 137 136 - 145 mmol/L "    Potassium 4.3 3.5 - 5.2 mmol/L    Chloride 99 98 - 107 mmol/L    CO2 28.4 22.0 - 29.0 mmol/L    Calcium 9.8 8.6 - 10.5 mg/dL    Total Protein 7.2 6.0 - 8.5 g/dL    Albumin 4.50 3.50 - 5.20 g/dL    ALT (SGPT) 18 1 - 33 U/L    AST (SGOT) 17 1 - 32 U/L    Alkaline Phosphatase 72 39 - 117 U/L    Total Bilirubin 0.3 0.0 - 1.2 mg/dL    eGFR Non African Amer 118 >60 mL/min/1.73    Globulin 2.7 gm/dL    A/G Ratio 1.7 g/dL    BUN/Creatinine Ratio 25.5 (H) 7.0 - 25.0    Anion Gap 9.6 5.0 - 15.0 mmol/L   Lipase    Specimen: Blood   Result Value Ref Range    Lipase 41 13 - 60 U/L   Amylase    Specimen: Blood   Result Value Ref Range    Amylase 74 28 - 100 U/L   Sedimentation Rate    Specimen: Blood   Result Value Ref Range    Sed Rate 15 0 - 30 mm/hr   C-reactive Protein    Specimen: Blood   Result Value Ref Range    C-Reactive Protein <0.30 0.00 - 0.50 mg/dL   CBC Auto Differential    Specimen: Blood   Result Value Ref Range    WBC 5.65 3.40 - 10.80 10*3/mm3    RBC 4.19 3.77 - 5.28 10*6/mm3    Hemoglobin 12.8 12.0 - 15.9 g/dL    Hematocrit 39.3 34.0 - 46.6 %    MCV 93.8 79.0 - 97.0 fL    MCH 30.5 26.6 - 33.0 pg    MCHC 32.6 31.5 - 35.7 g/dL    RDW 12.9 12.3 - 15.4 %    RDW-SD 45.1 37.0 - 54.0 fl    MPV 10.5 6.0 - 12.0 fL    Platelets 327 140 - 450 10*3/mm3    Neutrophil % 51.3 42.7 - 76.0 %    Lymphocyte % 31.2 19.6 - 45.3 %    Monocyte % 11.5 5.0 - 12.0 %    Eosinophil % 4.2 0.3 - 6.2 %    Basophil % 1.4 0.0 - 1.5 %    Immature Grans % 0.4 0.0 - 0.5 %    Neutrophils, Absolute 2.90 1.70 - 7.00 10*3/mm3    Lymphocytes, Absolute 1.76 0.70 - 3.10 10*3/mm3    Monocytes, Absolute 0.65 0.10 - 0.90 10*3/mm3    Eosinophils, Absolute 0.24 0.00 - 0.40 10*3/mm3    Basophils, Absolute 0.08 0.00 - 0.20 10*3/mm3    Immature Grans, Absolute 0.02 0.00 - 0.05 10*3/mm3    nRBC 0.0 0.0 - 0.2 /100 WBC     1/3/22 CT abd/pelvis - nl except diffuse fatty liver, diverticulosis, and degen changes spine/pelvis    3/1/21 , TG 57, HDL 85, LDL  133    ASSESSMENT/PLAN    Diagnoses and all orders for this visit:    1. Left lateral abdominal pain (Primary)  Assessment & Plan:  Chronic x 5 yrs with unremarkable labs 12/21, neg CT abd/pelvis 1/22, and neg cologuard 11/21; discussed trial of anti-spasmodic vs TCA; pt wants to try amitriptyline 10mg QHS #30, 1RF; send [employer] message in 1 month with feedback re: amitriptyline unless s/e in the meantime    Orders:  -     amitriptyline (ELAVIL) 10 MG tablet; Take 1 tablet by mouth Every Night.  Dispense: 30 tablet; Refill: 1    2. Hyperlipidemia  Assessment & Plan:  Discussed finding of fatty liver on CT; also reviewed bord lipids on last year's wellness visit; rec goal LDL < 130, ideally < 100; lipids pending with upcoming wellness 3/17/22        FOLLOW-UP  1. Health maintenance - COVID19 vacc done, incl 3rd dose Pfizer; flu vacc 9/21; reminded patient that Td due (Tdap 2012)  2. RTC for next wellness 3/17/22; fasting labs prior to appt (CBC, CMP, TSH, lipids, UA/micro, A1C, FT4)    Electronically signed by:    Mirna Stack MD, FACP  01/06/2022

## 2022-01-06 ENCOUNTER — OFFICE VISIT (OUTPATIENT)
Dept: INTERNAL MEDICINE | Facility: CLINIC | Age: 72
End: 2022-01-06

## 2022-01-06 VITALS
DIASTOLIC BLOOD PRESSURE: 74 MMHG | HEIGHT: 63 IN | HEART RATE: 69 BPM | SYSTOLIC BLOOD PRESSURE: 124 MMHG | WEIGHT: 144 LBS | OXYGEN SATURATION: 98 % | BODY MASS INDEX: 25.52 KG/M2

## 2022-01-06 DIAGNOSIS — R10.9 LEFT LATERAL ABDOMINAL PAIN: Primary | ICD-10-CM

## 2022-01-06 DIAGNOSIS — E78.00 PURE HYPERCHOLESTEROLEMIA: Chronic | ICD-10-CM

## 2022-01-06 PROCEDURE — 99214 OFFICE O/P EST MOD 30 MIN: CPT | Performed by: INTERNAL MEDICINE

## 2022-01-06 RX ORDER — AMITRIPTYLINE HYDROCHLORIDE 10 MG/1
10 TABLET, FILM COATED ORAL NIGHTLY
Qty: 30 TABLET | Refills: 1 | Status: SHIPPED | OUTPATIENT
Start: 2022-01-06 | End: 2022-03-15 | Stop reason: SDUPTHER

## 2022-01-07 NOTE — ASSESSMENT & PLAN NOTE
Discussed finding of fatty liver on CT; also reviewed bord lipids on last year's wellness visit; rec goal LDL < 130, ideally < 100; lipids pending with upcoming wellness 3/17/22

## 2022-01-07 NOTE — ASSESSMENT & PLAN NOTE
Chronic x 5 yrs with unremarkable labs 12/21, neg CT abd/pelvis 1/22, and neg cologuard 11/21; discussed trial of anti-spasmodic vs TCA; pt wants to try amitriptyline 10mg QHS #30, 1RF; send [employer] message in 1 month with feedback re: amitriptyline unless s/e in the meantime

## 2022-01-07 NOTE — ASSESSMENT & PLAN NOTE
Chronic x 5 yrs with unremarkable labs 12/21, neg CT abd/pelvis 1/22, and neg cologuard 11/21; discussed trial of anti-spasmodic vs TCA; pt wants to try amitriptyline 10mg QHS #30, 1RF; send [employer] message in 1 month with feedback re: amitriptyline unless s/e in the meantime

## 2022-03-08 ENCOUNTER — TELEPHONE (OUTPATIENT)
Dept: INTERNAL MEDICINE | Facility: CLINIC | Age: 72
End: 2022-03-08

## 2022-03-08 DIAGNOSIS — E78.00 PURE HYPERCHOLESTEROLEMIA: ICD-10-CM

## 2022-03-08 DIAGNOSIS — R73.01 IMPAIRED FASTING GLUCOSE: ICD-10-CM

## 2022-03-08 DIAGNOSIS — Z00.00 MEDICARE ANNUAL WELLNESS VISIT, SUBSEQUENT: Primary | ICD-10-CM

## 2022-03-09 ENCOUNTER — LAB (OUTPATIENT)
Dept: LAB | Facility: HOSPITAL | Age: 72
End: 2022-03-09

## 2022-03-09 DIAGNOSIS — Z00.00 MEDICARE ANNUAL WELLNESS VISIT, SUBSEQUENT: ICD-10-CM

## 2022-03-09 DIAGNOSIS — R73.01 IMPAIRED FASTING GLUCOSE: ICD-10-CM

## 2022-03-09 DIAGNOSIS — E78.00 PURE HYPERCHOLESTEROLEMIA: ICD-10-CM

## 2022-03-09 LAB
ALBUMIN SERPL-MCNC: 4.5 G/DL (ref 3.5–5.2)
ALBUMIN UR-MCNC: <1.2 MG/DL
ALBUMIN/GLOB SERPL: 1.9 G/DL
ALP SERPL-CCNC: 70 U/L (ref 39–117)
ALT SERPL W P-5'-P-CCNC: 13 U/L (ref 1–33)
ANION GAP SERPL CALCULATED.3IONS-SCNC: 11.2 MMOL/L (ref 5–15)
AST SERPL-CCNC: 21 U/L (ref 1–32)
BACTERIA UR QL AUTO: ABNORMAL /HPF
BASOPHILS # BLD AUTO: 0.05 10*3/MM3 (ref 0–0.2)
BASOPHILS NFR BLD AUTO: 0.8 % (ref 0–1.5)
BILIRUB SERPL-MCNC: 0.4 MG/DL (ref 0–1.2)
BILIRUB UR QL STRIP: NEGATIVE
BUN SERPL-MCNC: 11 MG/DL (ref 8–23)
BUN/CREAT SERPL: 17.5 (ref 7–25)
CALCIUM SPEC-SCNC: 9.3 MG/DL (ref 8.6–10.5)
CHLORIDE SERPL-SCNC: 98 MMOL/L (ref 98–107)
CHOLEST SERPL-MCNC: 201 MG/DL (ref 0–200)
CLARITY UR: CLEAR
CO2 SERPL-SCNC: 26.8 MMOL/L (ref 22–29)
COLOR UR: YELLOW
CREAT SERPL-MCNC: 0.63 MG/DL (ref 0.57–1)
CREAT UR-MCNC: 49.2 MG/DL
DEPRECATED RDW RBC AUTO: 41.4 FL (ref 37–54)
EGFRCR SERPLBLD CKD-EPI 2021: 93.8 ML/MIN/1.73
EOSINOPHIL # BLD AUTO: 0.88 10*3/MM3 (ref 0–0.4)
EOSINOPHIL NFR BLD AUTO: 13.7 % (ref 0.3–6.2)
ERYTHROCYTE [DISTWIDTH] IN BLOOD BY AUTOMATED COUNT: 12.3 % (ref 12.3–15.4)
GLOBULIN UR ELPH-MCNC: 2.4 GM/DL
GLUCOSE SERPL-MCNC: 105 MG/DL (ref 65–99)
GLUCOSE UR STRIP-MCNC: NEGATIVE MG/DL
HBA1C MFR BLD: 6.2 % (ref 4.8–5.6)
HCT VFR BLD AUTO: 36 % (ref 34–46.6)
HDLC SERPL-MCNC: 83 MG/DL (ref 40–60)
HGB BLD-MCNC: 12.3 G/DL (ref 12–15.9)
HGB UR QL STRIP.AUTO: NEGATIVE
HYALINE CASTS UR QL AUTO: ABNORMAL /LPF
IMM GRANULOCYTES # BLD AUTO: 0.02 10*3/MM3 (ref 0–0.05)
IMM GRANULOCYTES NFR BLD AUTO: 0.3 % (ref 0–0.5)
KETONES UR QL STRIP: NEGATIVE
LDLC SERPL CALC-MCNC: 111 MG/DL (ref 0–100)
LDLC/HDLC SERPL: 1.34 {RATIO}
LEUKOCYTE ESTERASE UR QL STRIP.AUTO: ABNORMAL
LYMPHOCYTES # BLD AUTO: 1.63 10*3/MM3 (ref 0.7–3.1)
LYMPHOCYTES NFR BLD AUTO: 25.4 % (ref 19.6–45.3)
MCH RBC QN AUTO: 31.1 PG (ref 26.6–33)
MCHC RBC AUTO-ENTMCNC: 34.2 G/DL (ref 31.5–35.7)
MCV RBC AUTO: 91.1 FL (ref 79–97)
MICROALBUMIN/CREAT UR: NORMAL MG/G{CREAT}
MONOCYTES # BLD AUTO: 0.58 10*3/MM3 (ref 0.1–0.9)
MONOCYTES NFR BLD AUTO: 9 % (ref 5–12)
NEUTROPHILS NFR BLD AUTO: 3.26 10*3/MM3 (ref 1.7–7)
NEUTROPHILS NFR BLD AUTO: 50.8 % (ref 42.7–76)
NITRITE UR QL STRIP: NEGATIVE
NRBC BLD AUTO-RTO: 0 /100 WBC (ref 0–0.2)
PH UR STRIP.AUTO: 6.5 [PH] (ref 5–8)
PLATELET # BLD AUTO: 338 10*3/MM3 (ref 140–450)
PMV BLD AUTO: 10.5 FL (ref 6–12)
POTASSIUM SERPL-SCNC: 4.5 MMOL/L (ref 3.5–5.2)
PROT SERPL-MCNC: 6.9 G/DL (ref 6–8.5)
PROT UR QL STRIP: NEGATIVE
RBC # BLD AUTO: 3.95 10*6/MM3 (ref 3.77–5.28)
RBC # UR STRIP: ABNORMAL /HPF
REF LAB TEST METHOD: ABNORMAL
SODIUM SERPL-SCNC: 136 MMOL/L (ref 136–145)
SP GR UR STRIP: 1.01 (ref 1–1.03)
SQUAMOUS #/AREA URNS HPF: ABNORMAL /HPF
T4 FREE SERPL-MCNC: 1.35 NG/DL (ref 0.93–1.7)
TRIGL SERPL-MCNC: 35 MG/DL (ref 0–150)
TSH SERPL DL<=0.05 MIU/L-ACNC: 4.55 UIU/ML (ref 0.27–4.2)
UROBILINOGEN UR QL STRIP: ABNORMAL
VLDLC SERPL-MCNC: 7 MG/DL (ref 5–40)
WBC # UR STRIP: ABNORMAL /HPF
WBC NRBC COR # BLD: 6.42 10*3/MM3 (ref 3.4–10.8)

## 2022-03-09 PROCEDURE — 84439 ASSAY OF FREE THYROXINE: CPT

## 2022-03-09 PROCEDURE — 82043 UR ALBUMIN QUANTITATIVE: CPT

## 2022-03-09 PROCEDURE — 81001 URINALYSIS AUTO W/SCOPE: CPT

## 2022-03-09 PROCEDURE — 84443 ASSAY THYROID STIM HORMONE: CPT

## 2022-03-09 PROCEDURE — 80061 LIPID PANEL: CPT

## 2022-03-09 PROCEDURE — 83036 HEMOGLOBIN GLYCOSYLATED A1C: CPT

## 2022-03-09 PROCEDURE — 82570 ASSAY OF URINE CREATININE: CPT

## 2022-03-09 PROCEDURE — 85025 COMPLETE CBC W/AUTO DIFF WBC: CPT

## 2022-03-09 PROCEDURE — 80053 COMPREHEN METABOLIC PANEL: CPT

## 2022-03-10 ENCOUNTER — TELEPHONE (OUTPATIENT)
Dept: INTERNAL MEDICINE | Facility: CLINIC | Age: 72
End: 2022-03-10

## 2022-03-10 NOTE — TELEPHONE ENCOUNTER
Pt notified of results, but no c/o UTI symptoms. Pt advised to drink plenty of water and we won't treat at this time, but we can recheck the urine at her appt next week. Pt verbalized understanding.

## 2022-03-10 NOTE — TELEPHONE ENCOUNTER
----- Message from Mirna Stack MD sent at 3/9/2022  7:34 PM EST -----  Will review details of labs at upcoming wellness. In the meantime, is she having any UTI sxs - burning, pain with urination, urgency, etc? If so, drink more water daily and RX macrobid 100mg BID x 5 days.     If no sxs, continue to hydrate well but do not recommend abx.

## 2022-03-15 NOTE — PROGRESS NOTES
ANNUAL WELLNESS VISIT    DRUG AND ALCOHOL USE      no alcohol use, no tobacco use and caffeine intake: 2 cups of caffeinated coffee per day    DIET AND PHYSICAL ACTIVITY     Diet: general    Exercise: frequently   Exercise Details: walking, stretching, weights, also mows yard    MOOD DISORDER AND COGNITIVE SCREENING   Depression Screening Tool Used yes - see PHQ-9; components reviewed with patient; Denies feeling blue, hopeless, depressed or not having pleasure doing things. Reports no concerns with sleep and no concerns with energy. Reports no concerns with appetite. Denies slow thoughts, slow movements, issues with concentration, or negative thoughts. Screening is NEG for active depression.      Anxiety Screening Tool Used yes     AUDIT screening 0     Mini-Cog Performed   Yes    1. Tell Patient 3 Words car tie pen    2. Administer Clock Test normal    3. Recall 3 words  car tie pen    4. Number Correct Items 3    FUNCTIONAL ABILITY AND LEVEL OF SAFETY   Hearing no hearing loss     Wears Hearing Aids No       Current Activities Independent      none  - see Funct/Cog Status Intake     Fall Risk Assessment       Has difficulty with walking or balance  No         Timed Up and Go (TUG) Test  8 sec.       If >12 sec, normal    ADVANCED DIRECTIVE  Advance Care Planning   ACP discussion was held with the patient during this visit. Patient has an advance directive (not in EMR), copy requested.  Advance Care Planning Discussion:  16 min or more spent on counseling; patient has advanced directive and living will.  Reviewed desires for end of life care, which is to have comfort care.  Patient does not want extraordinary life-sustaining measures, including no prolonged artificial life support. Encouraged patient to ensure family is aware of desires/preferences. Confirmed  is her healthcare surrogate.    PAIN SCREENING Do you have pain right now? no      If so, 1-10 scale: 0     Intermittent     Do you have pain every  day? No      Probable chronic pain: No     Recent Hospitalizations:  No recent hospitalization(s)..     MEDICATION REVIEW   - updated and reviewed (see Medication List).   - reviewed for potentially harmful drug-disease interactions in the elderly.   - reviewed for high risk medications in the elderly.   - aspirin use: No    BMI  Body mass index is 25.38 kg/m².    Patient's Body mass index is 25.38 kg/m². indicating that she is overweight (BMI 25-29.9). Patient's (Body mass index is 25.38 kg/m².) indicates that they are overweight with health conditions that include dyslipidemias, GERD, osteoarthritis and urinary stress incontinence . Weight is improving with lifestyle modifications. BMI is is above average; BMI management plan is completed. We discussed portion control and increasing exercise. .    _________________________________________________________    Chief Complaint   Patient presents with   • Medicare Wellness-subsequent   • Hyperlipidemia       History of Present Illness  71 y.o.  woman presents for updated phys examination and wellness visit. Had some dizziness and stopped the amitriptyline. Now thinks it was an ear infection. LUQ abd pain recurred off of amitriptyline, therefore she is back on it without side effects. Abd pain resolved. Also off of baclofen, only taken prn tylenol. States hip and knee pains are better.    Has (+) FH ALz in father and sister Freda. Interested in Alz studies -  and [facility].    Review of Systems  Denies headaches, visual changes, CP, palpitations, SOB, cough, abd pain, n/v/d, difficulty with urination, numbness/tingling, falls, mood changes, lightheadedness, hearing changes, rashes.    Only uses bladder pill with travel    Denies vaginal discharge or bleeding (no periods) or breast concerns.   All other ROS reviewed and negative.    Current Outpatient Medications:   •  amitriptyline (ELAVIL) 10 MG QHS  •  Cholecalciferol (VITAMIN D) 1000 UNITS QD  •   "Multiple Vitamin QD  •  psyllium (METAMUCIL) 0.52 G QD  •  tolterodine LA (DETROL LA) 4 MG prn travel per pt      VITALS:  /68   Pulse 78   Ht 158.8 cm (62.5\")   Wt 64 kg (141 lb)   SpO2 98%   BMI 25.38 kg/m²     Physical Exam  Vitals and nursing note reviewed.   Constitutional:       General: She is not in acute distress.     Appearance: Normal appearance. She is well-developed.   HENT:      Head: Normocephalic.      Right Ear: Tympanic membrane, ear canal and external ear normal.      Left Ear: Tympanic membrane, ear canal and external ear normal.      Nose: Nose normal.   Eyes:      Extraocular Movements: Extraocular movements intact.      Conjunctiva/sclera: Conjunctivae normal.      Pupils: Pupils are equal, round, and reactive to light.   Neck:      Vascular: No carotid bruit (bilaterally).   Cardiovascular:      Rate and Rhythm: Normal rate and regular rhythm.      Pulses: Normal pulses.      Heart sounds: Normal heart sounds.      Comments: 2+ PT pulses bilaterally  Pulmonary:      Effort: Pulmonary effort is normal. No respiratory distress.      Breath sounds: Normal breath sounds. No wheezing or rales.   Abdominal:      General: Bowel sounds are normal. There is no distension.      Palpations: Abdomen is soft. There is no mass.      Tenderness: There is no abdominal tenderness.   Genitourinary:     Comments: Chaperone declined by patient.    Breast exam unremarkable without masses, skin changes, nipple discharge, or axillary adenopathy.    Musculoskeletal:      Cervical back: Normal range of motion and neck supple.      Right lower leg: No edema.      Left lower leg: No edema.   Lymphadenopathy:      Cervical: No cervical adenopathy.   Skin:     General: Skin is warm and dry.      Findings: No rash.   Neurological:      Mental Status: She is alert and oriented to person, place, and time.      Cranial Nerves: No cranial nerve deficit.      Deep Tendon Reflexes: Reflexes normal.   Psychiatric:    "      Mood and Affect: Mood normal.         Behavior: Behavior normal.           LABS  Results for orders placed or performed in visit on 03/09/22   Comprehensive Metabolic Panel    Specimen: Blood   Result Value Ref Range    Glucose 105 (H) 65 - 99 mg/dL    BUN 11 8 - 23 mg/dL    Creatinine 0.63 0.57 - 1.00 mg/dL    Sodium 136 136 - 145 mmol/L    Potassium 4.5 3.5 - 5.2 mmol/L    Chloride 98 98 - 107 mmol/L    CO2 26.8 22.0 - 29.0 mmol/L    Calcium 9.3 8.6 - 10.5 mg/dL    Total Protein 6.9 6.0 - 8.5 g/dL    Albumin 4.50 3.50 - 5.20 g/dL    ALT (SGPT) 13 1 - 33 U/L    AST (SGOT) 21 1 - 32 U/L    Alkaline Phosphatase 70 39 - 117 U/L    Total Bilirubin 0.4 0.0 - 1.2 mg/dL    Globulin 2.4 gm/dL    A/G Ratio 1.9 g/dL    BUN/Creatinine Ratio 17.5 7.0 - 25.0    Anion Gap 11.2 5.0 - 15.0 mmol/L    eGFR 93.8 >60.0 mL/min/1.73   Lipid Panel    Specimen: Blood   Result Value Ref Range    Total Cholesterol 201 (H) 0 - 200 mg/dL    Triglycerides 35 0 - 150 mg/dL    HDL Cholesterol 83 (H) 40 - 60 mg/dL    LDL Cholesterol  111 (H) 0 - 100 mg/dL    VLDL Cholesterol 7 5 - 40 mg/dL    LDL/HDL Ratio 1.34    TSH    Specimen: Blood   Result Value Ref Range    TSH 4.550 (H) 0.270 - 4.200 uIU/mL   Microalbumin / Creatinine Urine Ratio - Urine, Clean Catch    Specimen: Urine, Clean Catch   Result Value Ref Range    Microalbumin/Creatinine Ratio      Creatinine, Urine 49.2 mg/dL    Microalbumin, Urine <1.2 mg/dL   T4, Free    Specimen: Blood   Result Value Ref Range    Free T4 1.35 0.93 - 1.70 ng/dL   Hemoglobin A1c    Specimen: Blood   Result Value Ref Range    Hemoglobin A1C 6.20 (H) 4.80 - 5.60 %   CBC Auto Differential    Specimen: Blood   Result Value Ref Range    WBC 6.42 3.40 - 10.80 10*3/mm3    RBC 3.95 3.77 - 5.28 10*6/mm3    Hemoglobin 12.3 12.0 - 15.9 g/dL    Hematocrit 36.0 34.0 - 46.6 %    MCV 91.1 79.0 - 97.0 fL    MCH 31.1 26.6 - 33.0 pg    MCHC 34.2 31.5 - 35.7 g/dL    RDW 12.3 12.3 - 15.4 %    RDW-SD 41.4 37.0 - 54.0 fl     MPV 10.5 6.0 - 12.0 fL    Platelets 338 140 - 450 10*3/mm3    Neutrophil % 50.8 42.7 - 76.0 %    Lymphocyte % 25.4 19.6 - 45.3 %    Monocyte % 9.0 5.0 - 12.0 %    Eosinophil % 13.7 (H) 0.3 - 6.2 %    Basophil % 0.8 0.0 - 1.5 %    Immature Grans % 0.3 0.0 - 0.5 %    Neutrophils, Absolute 3.26 1.70 - 7.00 10*3/mm3    Lymphocytes, Absolute 1.63 0.70 - 3.10 10*3/mm3    Monocytes, Absolute 0.58 0.10 - 0.90 10*3/mm3    Eosinophils, Absolute 0.88 (H) 0.00 - 0.40 10*3/mm3    Basophils, Absolute 0.05 0.00 - 0.20 10*3/mm3    Immature Grans, Absolute 0.02 0.00 - 0.05 10*3/mm3    nRBC 0.0 0.0 - 0.2 /100 WBC   Urinalysis without microscopic (no culture) - Urine, Clean Catch    Specimen: Urine, Clean Catch   Result Value Ref Range    Color, UA Yellow Yellow, Straw    Appearance, UA Clear Clear    pH, UA 6.5 5.0 - 8.0    Specific Gravity, UA 1.012 1.005 - 1.030    Glucose, UA Negative Negative    Ketones, UA Negative Negative    Bilirubin, UA Negative Negative    Blood, UA Negative Negative    Protein, UA Negative Negative    Leuk Esterase, UA Moderate (2+) (A) Negative    Nitrite, UA Negative Negative    Urobilinogen, UA 0.2 E.U./dL 0.2 - 1.0 E.U./dL   Urinalysis, Microscopic Only - Urine, Clean Catch    Specimen: Urine, Clean Catch   Result Value Ref Range    RBC, UA 0-2 None Seen, 0-2 /HPF    WBC, UA 31-50 (A) None Seen, 0-2 /HPF    Bacteria, UA 4+ (A) None Seen /HPF    Squamous Epithelial Cells, UA 0-2 None Seen, 0-2 /HPF    Hyaline Casts, UA None Seen None Seen /LPF    Methodology Automated Microscopy      10/21 A1C 5.9    3/21       ECG 12 Lead    Date/Time: 3/17/2022 9:00 AM  Performed by: Mirna Stack MD  Authorized by: Mirna Stack MD   Comparison: compared with previous ECG from 3/8/2021  Similar to previous ECG  Rhythm: sinus rhythm  Rate: normal  BPM: 74  Conduction: conduction normal  ST Segments: ST segments normal  T Waves: T waves normal  QRS axis: normal  Clinical impression comment: stable  EKG              ASSESSMENT/PLAN    Diagnoses and all orders for this visit:    1. Medicare annual wellness visit, subsequent (Primary)  Assessment & Plan:  Health maintenance - COVID19 vacc done, including 3rd dose Pfizer; flu vacc 9/21; Prevnar 2/16, PVX 2/14, Td due - she wants to get at pharmacy (Tdap 2012 good for 10 yrs), HAV and Shingrix done; mammo [facility] 4/8/21, next pending 5/24/22; no further Paps; DEXA 7/20, repeat 2023, colonosc 4/16, repeat 2026 per Dr. Knight, NEG cologuard 11/8/21; neg AAA 2/14; eye exam w/ new eye doctor pending 3/21/22 (prev Dr. Nieto); dental exam q6 mos; (+) seat belt use    Consultants:  Patient Care Team:  Mirna Stack MD as PCP - Nika Easton MD as Consulting Physician (Gastroenterology)  Oliver Resendiz MD as Consulting Physician (Medical Oncology)  Delmis Parker MD as Consulting Physician (Dermatology)  Jono Dawson MD (Surgical Oncology)  Holden Knight MD as Consulting Physician (Gastroenterology)  Daniel Rosen DPM (Podiatry)  Brandon John MD as Consulting Physician (Allergy)  Jake Cornelius MD as Consulting Physician (Otolaryngology)  Colt Nieto, LEFTY (Optometry)  David Davis MD as Consulting Physician (Ophthalmology)  Nettie Ribeiro MD as Consulting Physician (Rheumatology)  Bri Llamas DDS (Dental General Practice)  Quintin Borden MD as Consulting Physician (Rheumatology)  Bony Valdez APRN as Nurse Practitioner (Obstetrics and Gynecology)  Edwardo Diana MD as Consulting Physician (Orthopedic Surgery)          2. Impaired fasting glucose  Assessment & Plan:  BG control stable with A1C 6.2; encouraged reg phys activity to decr insulin resistance, moderation in unhealthy starches/sweets; f/u A1C in 6 mos        3. Hyperlipidemia  Assessment & Plan:  Lipids stable, actually improved with ; goal LDL < 130, ideally < 100; no meds    Orders:  -     ECG 12  "Lead    4. Abnormal finding on thyroid function test  Assessment & Plan:  Stable bord TFTs; no meds      5. Osteopenia  Assessment & Plan:  Calcium and vitamin D supplementation with weight-bearing exercise; DEXA 7/20, repeat 2023      6. Left lateral abdominal pain  Assessment & Plan:  Improved/resolved on amitriptyline 10mg QHS #90, 3RF    Orders:  -     amitriptyline (ELAVIL) 10 MG tablet; Take 1 tablet by mouth Every Night.  Dispense: 90 tablet; Refill: 3    7. Family history of Alzheimer's disease  Assessment & Plan:  Rec \"brain exercises\" with puzzles (she plays Wordle), reading, memorization, socialization, learning new activities/hobbies, and role of medications; agree with considering studies for Alz with (+) FH as they do want to study patients before they are symptomatic          FOLLOW-UP  RTC 6 mos with A1C    Electronically signed by:    Mirna Stack MD, FACP  03/17/2022                     "

## 2022-03-15 NOTE — ASSESSMENT & PLAN NOTE
Health maintenance - COVID19 vacc done, including 3rd dose Pfizer; flu vacc 9/21; Prevnar 2/16, PVX 2/14, Td due - she wants to get at pharmacy (Tdap 2012 good for 10 yrs), HAV and Shingrix done; mammo [facility] 4/8/21, next pending 5/24/22; no further Paps; DEXA 7/20, repeat 2023, colonosc 4/16, repeat 2026 per Dr. nKight, NEG cologuard 11/8/21; neg AAA 2/14; eye exam w/ new eye doctor pending 3/21/22 (prev Dr. Nieto); dental exam q6 mos; (+) seat belt use    Consultants:  Patient Care Team:  Mirna Stack MD as PCP - Nika Easton MD as Consulting Physician (Gastroenterology)  Oliver Resendiz MD as Consulting Physician (Medical Oncology)  Delmis Parker MD as Consulting Physician (Dermatology)  Jono Dawson MD (Surgical Oncology)  Holden Knight MD as Consulting Physician (Gastroenterology)  Daniel Rosen DPM (Podiatry)  Brandon John MD as Consulting Physician (Allergy)  Jake Cornelius MD as Consulting Physician (Otolaryngology)  Colt Nieto, LEFTY (Optometry)  David Davis MD as Consulting Physician (Ophthalmology)  Nettie Ribeiro MD as Consulting Physician (Rheumatology)  Bri Llamas, MATHEW (Dental General Practice)  Quintin Borden MD as Consulting Physician (Rheumatology)  Bony Valdez APRN as Nurse Practitioner (Obstetrics and Gynecology)  Edwardo Diana MD as Consulting Physician (Orthopedic Surgery)

## 2022-03-15 NOTE — ASSESSMENT & PLAN NOTE
BG control stable with A1C 6.2; encouraged reg phys activity to decr insulin resistance, moderation in unhealthy starches/sweets; f/u A1C in 6 mos

## 2022-03-17 ENCOUNTER — OFFICE VISIT (OUTPATIENT)
Dept: INTERNAL MEDICINE | Facility: CLINIC | Age: 72
End: 2022-03-17

## 2022-03-17 VITALS
DIASTOLIC BLOOD PRESSURE: 68 MMHG | HEART RATE: 78 BPM | HEIGHT: 63 IN | OXYGEN SATURATION: 98 % | BODY MASS INDEX: 24.98 KG/M2 | SYSTOLIC BLOOD PRESSURE: 120 MMHG | WEIGHT: 141 LBS

## 2022-03-17 DIAGNOSIS — R10.9 LEFT LATERAL ABDOMINAL PAIN: ICD-10-CM

## 2022-03-17 DIAGNOSIS — R73.01 IMPAIRED FASTING GLUCOSE: Chronic | ICD-10-CM

## 2022-03-17 DIAGNOSIS — M85.89 OSTEOPENIA OF MULTIPLE SITES: Chronic | ICD-10-CM

## 2022-03-17 DIAGNOSIS — Z82.0 FAMILY HISTORY OF ALZHEIMER'S DISEASE: ICD-10-CM

## 2022-03-17 DIAGNOSIS — E78.00 PURE HYPERCHOLESTEROLEMIA: Chronic | ICD-10-CM

## 2022-03-17 DIAGNOSIS — R94.6 ABNORMAL FINDING ON THYROID FUNCTION TEST: Chronic | ICD-10-CM

## 2022-03-17 DIAGNOSIS — Z00.00 MEDICARE ANNUAL WELLNESS VISIT, SUBSEQUENT: Primary | Chronic | ICD-10-CM

## 2022-03-17 PROCEDURE — 99497 ADVNCD CARE PLAN 30 MIN: CPT | Performed by: INTERNAL MEDICINE

## 2022-03-17 PROCEDURE — 1126F AMNT PAIN NOTED NONE PRSNT: CPT | Performed by: INTERNAL MEDICINE

## 2022-03-17 PROCEDURE — 99397 PER PM REEVAL EST PAT 65+ YR: CPT | Performed by: INTERNAL MEDICINE

## 2022-03-17 PROCEDURE — G0439 PPPS, SUBSEQ VISIT: HCPCS | Performed by: INTERNAL MEDICINE

## 2022-03-17 PROCEDURE — 93000 ELECTROCARDIOGRAM COMPLETE: CPT | Performed by: INTERNAL MEDICINE

## 2022-03-17 PROCEDURE — 1160F RVW MEDS BY RX/DR IN RCRD: CPT | Performed by: INTERNAL MEDICINE

## 2022-03-17 PROCEDURE — 1170F FXNL STATUS ASSESSED: CPT | Performed by: INTERNAL MEDICINE

## 2022-03-17 RX ORDER — AMITRIPTYLINE HYDROCHLORIDE 10 MG/1
10 TABLET, FILM COATED ORAL NIGHTLY
Qty: 90 TABLET | Refills: 3 | Status: SHIPPED | OUTPATIENT
Start: 2022-03-17 | End: 2023-03-18 | Stop reason: SDUPTHER

## 2022-03-17 NOTE — ASSESSMENT & PLAN NOTE
"Rec \"brain exercises\" with puzzles (she plays Wordle), reading, memorization, socialization, learning new activities/hobbies, and role of medications; agree with considering studies for Alz with (+) FH as they do want to study patients before they are symptomatic    "

## 2022-08-16 ENCOUNTER — OFFICE VISIT (OUTPATIENT)
Dept: ORTHOPEDIC SURGERY | Facility: CLINIC | Age: 72
End: 2022-08-16

## 2022-08-16 VITALS
HEIGHT: 63 IN | WEIGHT: 126.6 LBS | SYSTOLIC BLOOD PRESSURE: 119 MMHG | BODY MASS INDEX: 22.43 KG/M2 | DIASTOLIC BLOOD PRESSURE: 79 MMHG

## 2022-08-16 DIAGNOSIS — Z96.641 STATUS POST TOTAL REPLACEMENT OF RIGHT HIP: Primary | ICD-10-CM

## 2022-08-16 PROCEDURE — 99212 OFFICE O/P EST SF 10 MIN: CPT | Performed by: ORTHOPAEDIC SURGERY

## 2022-08-16 ASSESSMENT — HOOS JR
HOOS JR SCORE: 85.257
HOOS JR SCORE: 2

## 2022-08-16 NOTE — PROGRESS NOTES
Orthopaedic Clinic Note: Hip Established Patient    Chief Complaint   Patient presents with   • Follow-up     9 month f/u, 12 months s/p Total Hip Arthroplasty Right 8/2/21        HPI    It has been 9  month(s) since Ms. Estrada's last visit. She returns to clinic today for follow-up right total hip arthroplasty.  She is a year out from surgery.  Rates her pain a 1/10 on the pain scale primarily due to some lateral based trochanter soreness.  She states that this only occurs occasionally and is not affecting her ability to form daily activities.  She is ambulating with no assistive device.  Denies fevers chills or constitutional symptoms.  Overall she is happy with her outcome.    Past Medical History:   Diagnosis Date   • Abdominal pain, left lower quadrant 04/29/2016    Impression: 3/2/17 normal abd CT (except mild interstitial scarring lung bases); 11/14/2014 - ddx includes colon spasm, diverticulitis, less likely reflux, gastritis, pancreatitis, GYN etiology (h/o YULIYA/BSO but note (+)FH ovarian CA); with exam findings and hx, proceed with trial of SL levsin; if no better, plan to check labs and CT for further evaluation; note last colonoscopy done 2010 (to be re   • Arthritis of back    • Arthritis of neck    • Bursitis of hip    • CTS (carpal tunnel syndrome)    • Diarrhea 04/29/2016    Impression: 05/22/2014 - reassuring that sxs come/go; discontinue stool softener until resolution of sxs; monitor for food triggers; add fiber supplement 1x/day;  consider addition of probiotic but rec 30d trial if she proceeds; f/u if sxs do not resolve;    • Frozen shoulder    • Hip arthrosis    • Hx of AAA ultrasound 02/28/2014    neg AAA ultrasound (2/28/14)   • Hx of bone density study 03/07/2014    DEXA (3/7/14): L -1.4, H -1.6   • Hx of bone density study 03/16/2017    DEXA (3/16/17) L -1.7, H -1.3, repeat 3 yrs   • Hx of bone density study 07/31/2020    DEXA (7/31/20): L -1.6, H -1.3, repeat 3 yrs   • Hx of chest x-ray  12/09/2021    nl CXR (12/9/21) except for degen changes and scoliosis   • Hx of colonoscopy 04/18/2016    colonosc (4/18/16): hyperplastic polyp, diverticulosis, repeat 10 yrs; GI - Dr. Knight   • Hx of echocardiogram 03/16/2014    ECHO (2/17): mild concentric LVH and diastolic dysfunction, mild MR/TR/AR   • Hx of pelvic ultrasound 02/05/2020    nl pelvic u/s (2/5/20); ovaries absent   • Hx of thyroid ultrasound 01/30/2018    nl thyr u/s (1/30/18)      Past Surgical History:   Procedure Laterality Date   • BILATERAL OOPHORECTOMY  02/2003    due to (+)FH ovarian CA; GYN - Dr. Resendiz   • BREAST SURGERY  1971   • CATARACT EXTRACTION Bilateral    • CYST REMOVAL      toe, right middle    • FOOT SURGERY  Nov. 2016   • INCISIONAL BREAST BIOPSY      x2 (5/71 and 12/81)surg - Dr. Dawson   • TOTAL HIP ARTHROPLASTY Right 08/02/2021    Procedure: TOTAL HIP ARTHROPLASTY RIGHT;  Surgeon: Edwardo Diana MD;  Location: [facility];  Service: Orthopedics;  Laterality: Right;   • TUBAL ABDOMINAL LIGATION  1984      Family History   Problem Relation Age of Onset   • Ovarian cancer Mother    • Arthritis Mother    • Cancer Mother    • Hyperlipidemia Mother    • Alzheimer's disease Father    • Goiter Sister    • Heart attack Sister         tob   • Stroke Sister    • Alpha-1 antitrypsin deficiency Sister         carrier   • Arthritis Sister    • Hyperlipidemia Sister    • Cancer Sister    • Alzheimer's disease Sister    • Thyroid nodules Sister    • Rheum arthritis Sister    • Alpha-1 antitrypsin deficiency Sister         carrier   • Ovarian cancer Sister    • Hypothyroidism Sister    • Cancer Sister    • Breast cancer Sister    • Cancer Sister    • Heart attack Brother    • Alpha-1 antitrypsin deficiency Grandchild         carrier     Social History     Socioeconomic History   • Marital status:    Tobacco Use   • Smoking status: Never Smoker   • Smokeless tobacco: Never Used   Vaping Use   • Vaping Use: Never used  "  Substance and Sexual Activity   • Alcohol use: Never   • Drug use: Never   • Sexual activity: Yes     Partners: Male     Birth control/protection: Post-menopausal     Comment: spouse      Current Outpatient Medications on File Prior to Visit   Medication Sig Dispense Refill   • amitriptyline (ELAVIL) 10 MG tablet Take 1 tablet by mouth Every Night. 90 tablet 3   • Cholecalciferol (VITAMIN D) 1000 UNITS tablet Take 1,000 Units by mouth Daily.     • Multiple Vitamin (MULTI VITAMIN DAILY PO) Take 1 tablet by mouth Daily.     • psyllium (METAMUCIL) 0.52 g capsule Take 1 capsule by mouth Daily As Needed for Constipation.     • tolterodine LA (DETROL LA) 4 MG 24 hr capsule Take 1 capsule by mouth Daily. (Patient taking differently: Take 4 mg by mouth Daily As Needed (over active bladder, only takes when traveling).) 90 capsule 3     No current facility-administered medications on file prior to visit.      Allergies   Allergen Reactions   • Erythromycin Other (See Comments)     Chest pain   • Sulfa Antibiotics Rash        Review of Systems   Constitutional: Negative.    HENT: Negative.    Eyes: Negative.    Respiratory: Negative.    Cardiovascular: Negative.    Gastrointestinal: Negative.    Endocrine: Negative.    Genitourinary: Negative.    Musculoskeletal: Positive for arthralgias.   Skin: Negative.    Allergic/Immunologic: Negative.    Neurological: Negative.    Hematological: Negative.    Psychiatric/Behavioral: Negative.         The patient's Review of Systems was personally reviewed and confirmed as accurate.    Physical Exam  Blood pressure 119/79, height 158.8 cm (62.52\"), weight 57.4 kg (126 lb 9.6 oz).    Body mass index is 22.77 kg/m².    GENERAL APPEARANCE: awake, alert, oriented, in no acute distress and well developed, well nourished  LUNGS:  breathing nonlabored  EXTREMITIES: no clubbing, cyanosis  PERIPHERAL PULSES: palpable dorsalis pedis and posterior tibial pulses bilaterally.    GAIT:  Normal         "    Hip Exam:  Right    RANGE OF MOTION:  EXTENSION/FLEXION:  normal (0-110 degrees)  IR (at 90 degrees of flexion):  20  ER (at 90 degrees of flexion):  40  PAIN WITH HIP MOTION:  no  PAIN WITH LOGROLL:  no     STINCHFIELD TEST: negative    STRENGTH:  ABDUCTOR:  5/5  ADDUCTOR:  5/5  HIP FLEXION:  5/5    GREATER TROCHANTER BURSAL PAIN:  no    SENSATION TO LIGHT TOUCH:  DEEP PERONEAL/SUPERFICIAL PERONEAL/SURAL/SAPHENOUS/TIBIAL:   intact    EDEMA:  no  ERYTHEMA:  no  WOUNDS/INCISIONS:   yes, well healed surgical incision without evidence of erythema or drainage  _________________________________________________________________  _________________________________________________________________    RADIOGRAPHIC FINDINGS:   Indication: Status post right total hip arthroplasty    Comparison: Todays xrays were compared to previous xrays from 11/16/2021    AP pelvis: Right: Demonstrate a well positioned total hip without evidence of wear, loosening, fracture or osteolysis, femoral head is concentrically reduced within the acetabulum and No significant changes compared to prior radiographs.;Left: moderate joint space narrowing,  there are marginal osteophytes visualized at the femoral head-neck junction and acetabular margins and No significant changes compared to prior radiographs.      Assessment/Plan:   Diagnosis Plan   1. Status post total replacement of right hip  XR Pelvis 1 or 2 View     Patient doing well 1 year status post right total hip arthroplasty.  Recommend continued activity as tolerated without restrictions.  I will see the patient back in 2 years for repeat assessment x-ray P pelvis on return.  She is welcome to follow-up sooner should problems arise.    Edwardo Diana MD  08/16/22  09:39 EDT

## 2022-09-15 NOTE — PROGRESS NOTES
"Chief Complaint   Patient presents with   • Impaired Fasting Glucose       History of Present Illness  72 y.o.  woman presents for sugar follow-up.  States she is not worried but notes that she is losing weight.  No other focal symptoms including no rashes, chest pain, palpitations, shortness of breath, nausea/vomiting, urinary changes, bowel changes, bleeding.    Review of Systems  Denies falls, CP, headaches, visual changes. All other ROS reviewed and negative.    Current Outpatient Medications:   •  amitriptyline (ELAVIL) 10 MG QHS  •  Cholecalciferol (VITAMIN D) 1000 UNITS QD  •  Multiple Vitamin QD  •  psyllium (METAMUCIL) 0.52 g QD  •  tolterodine LA (DETROL LA) 4 MG QD (prn per patient)      VITALS:  /62   Pulse 75   Ht 158.8 cm (62.5\")   Wt 59.4 kg (131 lb)   SpO2 98%   BMI 23.58 kg/m²     Physical Exam  Vitals and nursing note reviewed.   Constitutional:       General: She is not in acute distress.     Appearance: Normal appearance. She is not ill-appearing.      Comments: Down 15 pounds over 5 months but then regained 5 pounds over the last month   Eyes:      Extraocular Movements: Extraocular movements intact.      Conjunctiva/sclera: Conjunctivae normal.   Pulmonary:      Effort: Pulmonary effort is normal. No respiratory distress.   Neurological:      Mental Status: She is alert and oriented to person, place, and time. Mental status is at baseline.      Gait: Gait normal.   Psychiatric:         Mood and Affect: Mood normal.         Behavior: Behavior normal.         LABS  Results for orders placed or performed in visit on 09/16/22   POC Glycosylated Hemoglobin (Hb A1C)    Specimen: Blood   Result Value Ref Range    Hemoglobin A1C 6.0 %    Lot Number [id number]     Expiration Date 05/11/24      3/22 A1C 6.2    ASSESSMENT/PLAN    Diagnoses and all orders for this visit:    1. Impaired fasting glucose (Primary)  Assessment & Plan:  BG control stable with A1C 6.0; encouraged reg phys " activity to decr insulin resistance, moderation in unhealthy starches/sweets; f/u A1C in 6 mos      Orders:  -     POC Glycosylated Hemoglobin (Hb A1C)    2. Urge incontinence of urine  Assessment & Plan:  States she only takes Detrol LA 4 mg with travel; RX #30, 1RF    Orders:  -     tolterodine LA (DETROL LA) 4 MG 24 hr capsule; Take 1 capsule by mouth Daily As Needed (over active bladder, only takes when traveling).  Dispense: 30 capsule; Refill: 1    3. Unintentional weight loss  Assessment & Plan:  Down 10 pounds of the last 6 months, having regained 5 pounds over the last month; she is up-to-date with her screening including mammogram (5/22) and colonoscopy (4/16); no focal symptoms to suggest organ etiology; recommend calorie counting for 2-3 days with goal [id number] angel/day; discussed ensuring balance of carbohydrates and protein in her diet; follow-up in 6 months at the latest        FOLLOW-UP  1. Health maintenance - COVID19 vacc done, incl 4th dose booster; rec flu vaccine soon and proceeding with new COVID19 vacc  2. RTC for next wellness 3/20/23; fasting labs prior to appt (CBC, CMP, TSH, lipids, UA/micro, A1C, FT4); and check wt    Electronically signed by:    Mirna Stack MD, FACP  09/16/2022

## 2022-09-15 NOTE — ASSESSMENT & PLAN NOTE
BG control stable with A1C 6.0; encouraged reg phys activity to decr insulin resistance, moderation in unhealthy starches/sweets; f/u A1C in 6 mos

## 2022-09-16 ENCOUNTER — OFFICE VISIT (OUTPATIENT)
Dept: INTERNAL MEDICINE | Facility: CLINIC | Age: 72
End: 2022-09-16

## 2022-09-16 VITALS
HEART RATE: 75 BPM | HEIGHT: 63 IN | DIASTOLIC BLOOD PRESSURE: 62 MMHG | OXYGEN SATURATION: 98 % | WEIGHT: 131 LBS | SYSTOLIC BLOOD PRESSURE: 118 MMHG | BODY MASS INDEX: 23.21 KG/M2

## 2022-09-16 DIAGNOSIS — N39.41 URGE INCONTINENCE OF URINE: ICD-10-CM

## 2022-09-16 DIAGNOSIS — R73.01 IMPAIRED FASTING GLUCOSE: Primary | Chronic | ICD-10-CM

## 2022-09-16 DIAGNOSIS — R63.4 UNINTENTIONAL WEIGHT LOSS: ICD-10-CM

## 2022-09-16 LAB
EXPIRATION DATE: NORMAL
HBA1C MFR BLD: 6 %
Lab: NORMAL

## 2022-09-16 PROCEDURE — 99214 OFFICE O/P EST MOD 30 MIN: CPT | Performed by: INTERNAL MEDICINE

## 2022-09-16 PROCEDURE — 3044F HG A1C LEVEL LT 7.0%: CPT | Performed by: INTERNAL MEDICINE

## 2022-09-16 PROCEDURE — 83036 HEMOGLOBIN GLYCOSYLATED A1C: CPT | Performed by: INTERNAL MEDICINE

## 2022-09-16 RX ORDER — TOLTERODINE 4 MG/1
4 CAPSULE, EXTENDED RELEASE ORAL DAILY PRN
Qty: 30 CAPSULE | Refills: 1 | Status: SHIPPED | OUTPATIENT
Start: 2022-09-16 | End: 2023-03-20 | Stop reason: SDUPTHER

## 2022-09-16 NOTE — ASSESSMENT & PLAN NOTE
Down 10 pounds of the last 6 months, having regained 5 pounds over the last month; she is up-to-date with her screening including mammogram (5/22) and colonoscopy (4/16); no focal symptoms to suggest organ etiology; recommend calorie counting for 2-3 days with goal [id number] angel/day; discussed ensuring balance of carbohydrates and protein in her diet; follow-up in 6 months at the latest

## 2023-02-17 DIAGNOSIS — R94.6 ABNORMAL FINDING ON THYROID FUNCTION TEST: Chronic | ICD-10-CM

## 2023-02-17 DIAGNOSIS — R73.01 IMPAIRED FASTING GLUCOSE: Chronic | ICD-10-CM

## 2023-02-17 DIAGNOSIS — Z00.00 MEDICARE ANNUAL WELLNESS VISIT, SUBSEQUENT: Primary | Chronic | ICD-10-CM

## 2023-02-17 DIAGNOSIS — E78.00 PURE HYPERCHOLESTEROLEMIA: Chronic | ICD-10-CM

## 2023-03-13 ENCOUNTER — LAB (OUTPATIENT)
Dept: LAB | Facility: HOSPITAL | Age: 73
End: 2023-03-13
Payer: MEDICARE

## 2023-03-13 DIAGNOSIS — R73.01 IMPAIRED FASTING GLUCOSE: Chronic | ICD-10-CM

## 2023-03-13 DIAGNOSIS — E78.00 PURE HYPERCHOLESTEROLEMIA: Chronic | ICD-10-CM

## 2023-03-13 DIAGNOSIS — R94.6 ABNORMAL FINDING ON THYROID FUNCTION TEST: Chronic | ICD-10-CM

## 2023-03-13 DIAGNOSIS — Z00.00 MEDICARE ANNUAL WELLNESS VISIT, SUBSEQUENT: ICD-10-CM

## 2023-03-13 LAB
ALBUMIN SERPL-MCNC: 4.6 G/DL (ref 3.5–5.2)
ALBUMIN/GLOB SERPL: 1.8 G/DL
ALP SERPL-CCNC: 78 U/L (ref 39–117)
ALT SERPL W P-5'-P-CCNC: 12 U/L (ref 1–33)
ANION GAP SERPL CALCULATED.3IONS-SCNC: 8 MMOL/L (ref 5–15)
AST SERPL-CCNC: 21 U/L (ref 1–32)
BACTERIA UR QL AUTO: ABNORMAL /HPF
BASOPHILS # BLD AUTO: 0.08 10*3/MM3 (ref 0–0.2)
BASOPHILS NFR BLD AUTO: 1.4 % (ref 0–1.5)
BILIRUB SERPL-MCNC: 0.3 MG/DL (ref 0–1.2)
BILIRUB UR QL STRIP: NEGATIVE
BUN SERPL-MCNC: 17 MG/DL (ref 8–23)
BUN/CREAT SERPL: 23.6 (ref 7–25)
CALCIUM SPEC-SCNC: 9.6 MG/DL (ref 8.6–10.5)
CHLORIDE SERPL-SCNC: 101 MMOL/L (ref 98–107)
CHOLEST SERPL-MCNC: 226 MG/DL (ref 0–200)
CLARITY UR: CLEAR
CO2 SERPL-SCNC: 28 MMOL/L (ref 22–29)
COLOR UR: YELLOW
CREAT SERPL-MCNC: 0.72 MG/DL (ref 0.57–1)
DEPRECATED RDW RBC AUTO: 40.9 FL (ref 37–54)
EGFRCR SERPLBLD CKD-EPI 2021: 87.9 ML/MIN/1.73
EOSINOPHIL # BLD AUTO: 0.36 10*3/MM3 (ref 0–0.4)
EOSINOPHIL NFR BLD AUTO: 6.5 % (ref 0.3–6.2)
ERYTHROCYTE [DISTWIDTH] IN BLOOD BY AUTOMATED COUNT: 12.6 % (ref 12.3–15.4)
GLOBULIN UR ELPH-MCNC: 2.6 GM/DL
GLUCOSE SERPL-MCNC: 108 MG/DL (ref 65–99)
GLUCOSE UR STRIP-MCNC: NEGATIVE MG/DL
HBA1C MFR BLD: 6.2 % (ref 4.8–5.6)
HCT VFR BLD AUTO: 37.7 % (ref 34–46.6)
HDLC SERPL-MCNC: 89 MG/DL (ref 40–60)
HGB BLD-MCNC: 12.6 G/DL (ref 12–15.9)
HGB UR QL STRIP.AUTO: NEGATIVE
HYALINE CASTS UR QL AUTO: ABNORMAL /LPF
IMM GRANULOCYTES # BLD AUTO: 0.01 10*3/MM3 (ref 0–0.05)
IMM GRANULOCYTES NFR BLD AUTO: 0.2 % (ref 0–0.5)
KETONES UR QL STRIP: NEGATIVE
LDLC SERPL CALC-MCNC: 128 MG/DL (ref 0–100)
LDLC/HDLC SERPL: 1.42 {RATIO}
LEUKOCYTE ESTERASE UR QL STRIP.AUTO: ABNORMAL
LYMPHOCYTES # BLD AUTO: 1.94 10*3/MM3 (ref 0.7–3.1)
LYMPHOCYTES NFR BLD AUTO: 34.8 % (ref 19.6–45.3)
MCH RBC QN AUTO: 30.5 PG (ref 26.6–33)
MCHC RBC AUTO-ENTMCNC: 33.4 G/DL (ref 31.5–35.7)
MCV RBC AUTO: 91.3 FL (ref 79–97)
MONOCYTES # BLD AUTO: 0.55 10*3/MM3 (ref 0.1–0.9)
MONOCYTES NFR BLD AUTO: 9.9 % (ref 5–12)
NEUTROPHILS NFR BLD AUTO: 2.63 10*3/MM3 (ref 1.7–7)
NEUTROPHILS NFR BLD AUTO: 47.2 % (ref 42.7–76)
NITRITE UR QL STRIP: NEGATIVE
NRBC BLD AUTO-RTO: 0 /100 WBC (ref 0–0.2)
PH UR STRIP.AUTO: 7.5 [PH] (ref 5–8)
PLATELET # BLD AUTO: 334 10*3/MM3 (ref 140–450)
PMV BLD AUTO: 10.3 FL (ref 6–12)
POTASSIUM SERPL-SCNC: 5.7 MMOL/L (ref 3.5–5.2)
PROT SERPL-MCNC: 7.2 G/DL (ref 6–8.5)
PROT UR QL STRIP: NEGATIVE
RBC # BLD AUTO: 4.13 10*6/MM3 (ref 3.77–5.28)
RBC # UR STRIP: ABNORMAL /HPF
REF LAB TEST METHOD: ABNORMAL
SODIUM SERPL-SCNC: 137 MMOL/L (ref 136–145)
SP GR UR STRIP: 1.02 (ref 1–1.03)
SQUAMOUS #/AREA URNS HPF: ABNORMAL /HPF
T4 FREE SERPL-MCNC: 1.25 NG/DL (ref 0.93–1.7)
TRIGL SERPL-MCNC: 51 MG/DL (ref 0–150)
TSH SERPL DL<=0.05 MIU/L-ACNC: 6.98 UIU/ML (ref 0.27–4.2)
UROBILINOGEN UR QL STRIP: ABNORMAL
VLDLC SERPL-MCNC: 9 MG/DL (ref 5–40)
WBC # UR STRIP: ABNORMAL /HPF
WBC NRBC COR # BLD: 5.57 10*3/MM3 (ref 3.4–10.8)

## 2023-03-13 PROCEDURE — 80053 COMPREHEN METABOLIC PANEL: CPT

## 2023-03-13 PROCEDURE — 84443 ASSAY THYROID STIM HORMONE: CPT

## 2023-03-13 PROCEDURE — 81001 URINALYSIS AUTO W/SCOPE: CPT

## 2023-03-13 PROCEDURE — 80061 LIPID PANEL: CPT

## 2023-03-13 PROCEDURE — 84439 ASSAY OF FREE THYROXINE: CPT

## 2023-03-13 PROCEDURE — 85025 COMPLETE CBC W/AUTO DIFF WBC: CPT

## 2023-03-13 PROCEDURE — 83036 HEMOGLOBIN GLYCOSYLATED A1C: CPT

## 2023-03-14 ENCOUNTER — TELEPHONE (OUTPATIENT)
Dept: INTERNAL MEDICINE | Facility: CLINIC | Age: 73
End: 2023-03-14
Payer: MEDICARE

## 2023-03-14 NOTE — TELEPHONE ENCOUNTER
----- Message from iMrna Stack MD sent at 3/14/2023  1:02 AM EDT -----  Pls let patient know will review details of labs at upcoming wellness. In the interim, K is mildly elevated. Pls ensure not taking any potassium supplements, incl in vitamins; come to upcoming wellness appt well-hydrated (nonfasting) so we can repeat the potassium test on that day

## 2023-03-19 NOTE — PROGRESS NOTES
ANNUAL WELLNESS VISIT    DRUG AND ALCOHOL USE      no alcohol use, no tobacco use and caffeine intake: 1 cups of caffeinated coffee per day    DIET AND PHYSICAL ACTIVITY     Diet: general    Exercise: frequently   Exercise Details: walking and stretching    MOOD DISORDER AND COGNITIVE SCREENING     PHQ-2 Depression Screening - components reviewed with patient; screening is negative for active depression.    Little interest or pleasure in doing things? 0-->not at all   Feeling down, depressed, or hopeless? 0-->not at all   PHQ-2 Total Score 0      Anxiety Screening Tool Used YES     AUDIT screening 0     Mini-Cog Performed   Yes    1. Tell Patient 3 Words [place]    2. Administer Clock Test normal    3. Recall 3 words  [place]    4. Number Correct Items 3    FUNCTIONAL ABILITY AND LEVEL OF SAFETY   Hearing no hearing loss     Wears Hearing Aids No       Current Activities Independent      none  - see Funct/Cog Status Intake     Fall Risk Assessment       Has difficulty with walking or balance  No         Timed Up and Go (TUG) Test  7 sec.       If >12 sec, normal    ADVANCED DIRECTIVE  Advance Care Planning   ACP discussion was held with the patient during this visit. Patient has an advance directive in EMR which is still valid.   Advance Care Planning Discussion:  Patient has advanced directive and living will.  Reviewed desires for end of life care, which is to have comfort care.  Patient does not want extraordinary life-sustaining measures, including no prolonged artificial life support. Encouraged patient to ensure family is aware of desires/preferences. Confirmed  Norm is her healthcare surrogate.    PAIN SCREENING Do you have pain right now? yes      If so, 1-10 scale: 2     Intermittent     Do you have pain every day? No      Probable chronic pain: No     Recent Hospitalizations:  No recent hospitalization(s)..     MEDICATION REVIEW   - updated and reviewed (see Medication List).   -  "reviewed for potentially harmful drug-disease interactions in the elderly.   - reviewed for high risk medications in the elderly.   - aspirin use: No    BMI  Body mass index is 23.4 kg/m².  BMI is within normal parameters. No other follow-up for BMI required.       _________________________________________________________    Chief Complaint   Patient presents with   • Medicare Wellness-subsequent   • Hyperlipidemia       History of Present Illness  72 y.o.  woman presents for updated phys examination and wellness visit as well as f/u on cholesterol, sugars, and left abd pain. Notes left abd discomfort is much improved on amitriptyline but it remains persistent with low-grade discomfort. Interested in updating colonoscopy; denies bowel changes.     Reports regular follow-ups with Dr. 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 Notes \"crooked lines\" resolved with the injections.    Notes increased prominence of chronic lumpy area of left breast above scar of previous lumpectomy. Thinks it is due to loss of breast tissue due to weight loss. Lump has been there chronically; denies assoc'd pain or erythema or change in size. No assoc'd rash.      Review of Systems  Denies headaches, visual changes, CP, palpitations, SOB, cough, n/v/d, difficulty with urination(except frequency with travel), numbness/tingling, falls, mood changes, lightheadedness, hearing changes, rashes.    ROS (+) for chronic low-grade left lat abd pain, no assoc'd with food intake, BMs, or urination. Decreased with amitriptyline.    Denies vaginal discharge or bleeding (no periods) but notes increased prominence of chronic left breast lump above scar, no change in size; no nipple discharge or rash.   All other ROS reviewed and negative.    Current Outpatient Medications:   •  amitriptyline (ELAVIL) 10 MG QHS  •  Cholecalciferol (VITAMIN D) 1000 UNITS QD   •  Multiple Vitamin QD  •  psyllium (METAMUCIL) 0.52 g qd  •  " "tolterodine LA 4 MG QD only when traveling      VITALS:  /62   Pulse 90   Ht 158.8 cm (62.5\")   Wt 59 kg (130 lb)   SpO2 100%   BMI 23.40 kg/m²     Physical Exam  Vitals and nursing note reviewed.   Constitutional:       General: She is not in acute distress.     Appearance: Normal appearance. She is well-developed.   HENT:      Head: Normocephalic.      Right Ear: Tympanic membrane, ear canal and external ear normal.      Left Ear: Tympanic membrane, ear canal and external ear normal.      Nose: Nose normal.   Eyes:      Extraocular Movements: Extraocular movements intact.      Conjunctiva/sclera: Conjunctivae normal.      Pupils: Pupils are equal, round, and reactive to light.   Neck:      Vascular: No carotid bruit (bilaterally).   Cardiovascular:      Rate and Rhythm: Normal rate and regular rhythm.      Heart sounds: Normal heart sounds.   Pulmonary:      Effort: Pulmonary effort is normal. No respiratory distress.      Breath sounds: Normal breath sounds. No wheezing or rales.   Abdominal:      General: Bowel sounds are normal. There is no distension.      Palpations: Abdomen is soft. There is no mass.      Tenderness: There is no abdominal tenderness.   Genitourinary:     Comments: Chaperone declined by patient.    Breast exam with irreg contoured nodular fullness about quarter-sized 2 inches from edge of left areola at 2:00 superior ro scar, nontender to palpation; otherwise unremarkable without skin changes, nipple discharge, or axillary adenopathy. Right breast exam unremarkable.   Musculoskeletal:      Cervical back: Normal range of motion and neck supple.      Right lower leg: No edema.      Left lower leg: No edema.   Lymphadenopathy:      Cervical: No cervical adenopathy.   Skin:     General: Skin is warm and dry.      Findings: No rash.   Neurological:      Mental Status: She is alert and oriented to person, place, and time.      Cranial Nerves: No cranial nerve deficit.      Deep Tendon " Reflexes: Reflexes normal.   Psychiatric:         Mood and Affect: Mood normal.         Behavior: Behavior normal.           LABS  Results for orders placed or performed in visit on 03/13/23   Comprehensive Metabolic Panel    Specimen: Blood   Result Value Ref Range    Glucose 108 (H) 65 - 99 mg/dL    BUN 17 8 - 23 mg/dL    Creatinine 0.72 0.57 - 1.00 mg/dL    Sodium 137 136 - 145 mmol/L    Potassium 5.7 (H) 3.5 - 5.2 mmol/L    Chloride 101 98 - 107 mmol/L    CO2 28.0 22.0 - 29.0 mmol/L    Calcium 9.6 8.6 - 10.5 mg/dL    Total Protein 7.2 6.0 - 8.5 g/dL    Albumin 4.6 3.5 - 5.2 g/dL    ALT (SGPT) 12 1 - 33 U/L    AST (SGOT) 21 1 - 32 U/L    Alkaline Phosphatase 78 39 - 117 U/L    Total Bilirubin 0.3 0.0 - 1.2 mg/dL    Globulin 2.6 gm/dL    A/G Ratio 1.8 g/dL    BUN/Creatinine Ratio 23.6 7.0 - 25.0    Anion Gap 8.0 5.0 - 15.0 mmol/L    eGFR 87.9 >60.0 mL/min/1.73   Lipid Panel    Specimen: Blood   Result Value Ref Range    Total Cholesterol 226 (H) 0 - 200 mg/dL    Triglycerides 51 0 - 150 mg/dL    HDL Cholesterol 89 (H) 40 - 60 mg/dL    LDL Cholesterol  128 (H) 0 - 100 mg/dL    VLDL Cholesterol 9 5 - 40 mg/dL    LDL/HDL Ratio 1.42    TSH    Specimen: Blood   Result Value Ref Range    TSH 6.980 (H) 0.270 - 4.200 uIU/mL   T4, Free    Specimen: Blood   Result Value Ref Range    Free T4 1.25 0.93 - 1.70 ng/dL   Hemoglobin A1c    Specimen: Blood   Result Value Ref Range    Hemoglobin A1C 6.20 (H) 4.80 - 5.60 %   CBC Auto Differential    Specimen: Blood   Result Value Ref Range    WBC 5.57 3.40 - 10.80 10*3/mm3    RBC 4.13 3.77 - 5.28 10*6/mm3    Hemoglobin 12.6 12.0 - 15.9 g/dL    Hematocrit 37.7 34.0 - 46.6 %    MCV 91.3 79.0 - 97.0 fL    MCH 30.5 26.6 - 33.0 pg    MCHC 33.4 31.5 - 35.7 g/dL    RDW 12.6 12.3 - 15.4 %    RDW-SD 40.9 37.0 - 54.0 fl    MPV 10.3 6.0 - 12.0 fL    Platelets 334 140 - 450 10*3/mm3    Neutrophil % 47.2 42.7 - 76.0 %    Lymphocyte % 34.8 19.6 - 45.3 %    Monocyte % 9.9 5.0 - 12.0 %    Eosinophil  % 6.5 (H) 0.3 - 6.2 %    Basophil % 1.4 0.0 - 1.5 %    Immature Grans % 0.2 0.0 - 0.5 %    Neutrophils, Absolute 2.63 1.70 - 7.00 10*3/mm3    Lymphocytes, Absolute 1.94 0.70 - 3.10 10*3/mm3    Monocytes, Absolute 0.55 0.10 - 0.90 10*3/mm3    Eosinophils, Absolute 0.36 0.00 - 0.40 10*3/mm3    Basophils, Absolute 0.08 0.00 - 0.20 10*3/mm3    Immature Grans, Absolute 0.01 0.00 - 0.05 10*3/mm3    nRBC 0.0 0.0 - 0.2 /100 WBC   Urinalysis without microscopic (no culture) - Urine, Clean Catch    Specimen: Urine, Clean Catch   Result Value Ref Range    Color, UA Yellow Yellow, Straw    Appearance, UA Clear Clear    pH, UA 7.5 5.0 - 8.0    Specific Gravity, UA 1.017 1.005 - 1.030    Glucose, UA Negative Negative    Ketones, UA Negative Negative    Bilirubin, UA Negative Negative    Blood, UA Negative Negative    Protein, UA Negative Negative    Leuk Esterase, UA Moderate (2+) (A) Negative    Nitrite, UA Negative Negative    Urobilinogen, UA 0.2 E.U./dL 0.2 - 1.0 E.U./dL   Urinalysis, Microscopic Only - Urine, Clean Catch    Specimen: Urine, Clean Catch   Result Value Ref Range    RBC, UA 0-2 None Seen, 0-2 /HPF    WBC, UA 21-30 (A) None Seen, 0-2 /HPF    Bacteria, UA None Seen None Seen /HPF    Squamous Epithelial Cells, UA 0-2 None Seen, 0-2 /HPF    Hyaline Casts, UA 0-2 None Seen /LPF    Methodology Automated Microscopy      9/22 A1C 6.0    3/22       ECG 12 Lead    Date/Time: 3/20/2023 10:24 AM  Performed by: Mirna Stack MD  Authorized by: Mirna Stack MD   Comparison: compared with previous ECG from 3/17/2022  Similar to previous ECG  Rhythm: sinus rhythm  Rate: normal  BPM: 72  Conduction: conduction normal  ST Segments: ST segments normal  T Waves: T waves normal  QRS axis: normal  Clinical impression comment: stable EKG              ASSESSMENT/PLAN    Diagnoses and all orders for this visit:    1. Medicare annual wellness visit, subsequent (Primary)  Assessment & Plan:  Health maintenance - COVID19 vacc  done, incl new COVID19 vacc; flu vacc 9/22; Prevnar 2/16, PVX 2/14, Tdap 5/22 (next Td due 2032), HAV and Shingrix done; mammo [facility] 5/24/22; no further Paps; DEXA ordered (last 7/200), colonosc 4/16, repeat 2026 per Dr. Knight, NEG cologuard 11/8/21; neg AAA 2/14; eye exam w/ Dr. 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; dental exam q6 mos; (+) seat belt use    Consultants:  Patient Care Team:  Mirna Stack MD as PCP - Nika Easton MD as Consulting Physician (Gastroenterology)  Oliver Resendiz MD as Consulting Physician (Medical Oncology)  Delmis Parker MD as Consulting Physician (Dermatology)  Jono Dawson MD (Surgical Oncology)  Holden Knight MD as Consulting Physician (Gastroenterology)  Daniel Rosen DPM (Podiatry)  Brandon John MD as Consulting Physician (Allergy)  Jake Cornelius MD as Consulting Physician (Otolaryngology)  Colt Nieto OD (Optometry)  David Davis MD as Consulting Physician (Ophthalmology)  Nettie Ribeiro MD as Consulting Physician (Rheumatology)  Bri Llamas DDS (Dental General Practice)  Quintin Borden MD as Consulting Physician (Rheumatology)  Bony Valdez APRN as Nurse Practitioner (Obstetrics and Gynecology)  Edwardo Diana MD as Consulting Physician (Orthopedic Surgery)          2. Hyperlipidemia  Assessment & Plan:  Lipids acceptable but increased with ; goal LDL < 130, ideally < 100; no meds currently; decrease saturated fats and cholesterol in the diet; repeat lipids in 12 mos      Orders:  -     ECG 12 Lead    3. Impaired fasting glucose  Assessment & Plan:  BG control stable with A1C 6.2; encouraged reg phys activity to decr insulin resistance, moderation in unhealthy starches/sweets; f/u A1C in 6 mos        4. Osteopenia  Assessment & Plan:  Calcium and vitamin D supplementation with weight-bearing exercise; DEXA ordered         5.  Left lateral abdominal pain  Assessment & Plan:  Remains improved but not resolved on amitriptyline 10mg QHS #90, 3RF; no s/e, therefore can try 20mg QHS for 1-2 wks to see whether that further improves left lat abd pain; if successful, will send MyChart msg to advise so we can increase her RX; ok to proceed with colonoscopy - note no bowel changes    Orders:  -     amitriptyline (ELAVIL) 10 MG tablet; Take 1 tablet by mouth Every Night.  Dispense: 90 tablet; Refill: 3  -     Ambulatory Referral to Gastroenterology    6. Menopause  -     DEXA Bone Density Axial; Future    7. Abnormal finding on thyroid function test  Assessment & Plan:  Stable bord TFTs, c/w subclinical hypothyroidism; f/u TFTs in 6 mos      8. Hyperkalemia  Comments:  repeat BMP on the way out the door, well-hydrated/nonfasting; not on K supplement  Orders:  -     Basic Metabolic Panel; Future    9. Urge incontinence of urine  Assessment & Plan:  States she only takes Detrol LA 4 mg with travel; RX #30, 1RF    Orders:  -     tolterodine LA (DETROL LA) 4 MG 24 hr capsule; Take 1 capsule by mouth Daily As Needed (over active bladder, only takes when traveling).  Dispense: 30 capsule; Refill: 1    10. Screening for colon cancer  -     Ambulatory Referral to Gastroenterology    11. Mass of upper outer quadrant of left breast  Comments:  note previous lumpectomy left outer breast; chronic nodular mass superiorly - proceed with dx mammo for further eval  Orders:  -     Mammo Diagnostic Digital Tomosynthesis Bilateral With CAD; Future      FOLLOW-UP  1. BMP on the way out the door (f/u hyperkalemia)  2. Will let us know if increased amitriptyline 20mg QHS is effetive for chronic left abd pain  3. RTC 6 mos with A1C    Electronically signed by:    Mirna Stack MD, FACP  03/20/2023

## 2023-03-19 NOTE — ASSESSMENT & PLAN NOTE
Lipids acceptable but increased with ; goal LDL < 130, ideally < 100; no meds currently; decrease saturated fats and cholesterol in the diet; repeat lipids in 12 mos

## 2023-03-19 NOTE — ASSESSMENT & PLAN NOTE
Remains improved but not resolved on amitriptyline 10mg QHS #90, 3RF; no s/e, therefore can try 20mg QHS for 1-2 wks to see whether that further improves left lat abd pain; if successful, will send [facility] msg to advise so we can increase her RX; ok to proceed with colonoscopy - note no bowel changes

## 2023-03-19 NOTE — ASSESSMENT & PLAN NOTE
Health maintenance - COVID19 vacc done, incl new COVID19 vacc; flu vacc 9/22; Prevnar 2/16, PVX 2/14, Tdap 5/22 (next Td due 2032), HAV and Shingrix done; mammo [facility] 5/24/22; no further Paps; DEXA ordered (last 7/200), colonosc 4/16, repeat 2026 per Dr. Knight, NEG cologuard 11/8/21; neg AAA 2/14; eye exam w/ Dr. 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; dental exam q6 mos; (+) seat belt use    Consultants:  Patient Care Team:  Mirna Stack MD as PCP - General  Nika Gbibs MD as Consulting Physician (Gastroenterology)  Oliver Resendiz MD as Consulting Physician (Medical Oncology)  Delmis Parker MD as Consulting Physician (Dermatology)  Jono Dawson MD (Surgical Oncology)  Holden Knight MD as Consulting Physician (Gastroenterology)  Daniel Rosen DPM (Podiatry)  Brandon John MD as Consulting Physician (Allergy)  Jake Cornelius MD as Consulting Physician (Otolaryngology)  Colt Nieto OD (Optometry)  David Davis MD as Consulting Physician (Ophthalmology)  Nettie Ribeiro MD as Consulting Physician (Rheumatology)  Bri Llamas DDS (Dental General Practice)  Quintin Borden MD as Consulting Physician (Rheumatology)  Bony Valdez APRN as Nurse Practitioner (Obstetrics and Gynecology)  Edwardo Diana MD as Consulting Physician (Orthopedic Surgery)

## 2023-03-20 ENCOUNTER — LAB (OUTPATIENT)
Dept: LAB | Facility: HOSPITAL | Age: 73
End: 2023-03-20
Payer: MEDICARE

## 2023-03-20 ENCOUNTER — OFFICE VISIT (OUTPATIENT)
Dept: INTERNAL MEDICINE | Facility: CLINIC | Age: 73
End: 2023-03-20
Payer: MEDICARE

## 2023-03-20 VITALS
OXYGEN SATURATION: 100 % | WEIGHT: 130 LBS | HEART RATE: 90 BPM | HEIGHT: 63 IN | SYSTOLIC BLOOD PRESSURE: 118 MMHG | BODY MASS INDEX: 23.04 KG/M2 | DIASTOLIC BLOOD PRESSURE: 62 MMHG

## 2023-03-20 DIAGNOSIS — Z78.0 MENOPAUSE: Chronic | ICD-10-CM

## 2023-03-20 DIAGNOSIS — M85.89 OSTEOPENIA OF MULTIPLE SITES: Chronic | ICD-10-CM

## 2023-03-20 DIAGNOSIS — E78.00 PURE HYPERCHOLESTEROLEMIA: Chronic | ICD-10-CM

## 2023-03-20 DIAGNOSIS — Z00.00 MEDICARE ANNUAL WELLNESS VISIT, SUBSEQUENT: Primary | Chronic | ICD-10-CM

## 2023-03-20 DIAGNOSIS — R94.6 ABNORMAL FINDING ON THYROID FUNCTION TEST: Chronic | ICD-10-CM

## 2023-03-20 DIAGNOSIS — E87.5 HYPERKALEMIA: ICD-10-CM

## 2023-03-20 DIAGNOSIS — N39.41 URGE INCONTINENCE OF URINE: ICD-10-CM

## 2023-03-20 DIAGNOSIS — R10.9 LEFT LATERAL ABDOMINAL PAIN: ICD-10-CM

## 2023-03-20 DIAGNOSIS — Z12.11 SCREENING FOR COLON CANCER: ICD-10-CM

## 2023-03-20 DIAGNOSIS — R73.01 IMPAIRED FASTING GLUCOSE: Chronic | ICD-10-CM

## 2023-03-20 DIAGNOSIS — N63.21 MASS OF UPPER OUTER QUADRANT OF LEFT BREAST: ICD-10-CM

## 2023-03-20 PROBLEM — H35.3111 EARLY DRY STAGE NONEXUDATIVE AGE-RELATED MACULAR DEGENERATION OF RIGHT EYE: Status: ACTIVE | Noted: 2023-03-20

## 2023-03-20 PROCEDURE — 1170F FXNL STATUS ASSESSED: CPT | Performed by: INTERNAL MEDICINE

## 2023-03-20 PROCEDURE — 93000 ELECTROCARDIOGRAM COMPLETE: CPT | Performed by: INTERNAL MEDICINE

## 2023-03-20 PROCEDURE — 1160F RVW MEDS BY RX/DR IN RCRD: CPT | Performed by: INTERNAL MEDICINE

## 2023-03-20 PROCEDURE — 1159F MED LIST DOCD IN RCRD: CPT | Performed by: INTERNAL MEDICINE

## 2023-03-20 PROCEDURE — 80048 BASIC METABOLIC PNL TOTAL CA: CPT

## 2023-03-20 PROCEDURE — 99214 OFFICE O/P EST MOD 30 MIN: CPT | Performed by: INTERNAL MEDICINE

## 2023-03-20 PROCEDURE — G0439 PPPS, SUBSEQ VISIT: HCPCS | Performed by: INTERNAL MEDICINE

## 2023-03-20 RX ORDER — AMITRIPTYLINE HYDROCHLORIDE 10 MG/1
10 TABLET, FILM COATED ORAL NIGHTLY
Qty: 90 TABLET | Refills: 3 | Status: SHIPPED | OUTPATIENT
Start: 2023-03-20

## 2023-03-20 RX ORDER — MULTIPLE VITAMINS W/ MINERALS TAB 9MG-400MCG
1 TAB ORAL DAILY
COMMUNITY

## 2023-03-20 RX ORDER — TOLTERODINE 4 MG/1
4 CAPSULE, EXTENDED RELEASE ORAL DAILY PRN
Qty: 30 CAPSULE | Refills: 1 | Status: SHIPPED | OUTPATIENT
Start: 2023-03-20

## 2023-03-21 LAB
ANION GAP SERPL CALCULATED.3IONS-SCNC: 11 MMOL/L (ref 5–15)
BUN SERPL-MCNC: 17 MG/DL (ref 8–23)
BUN/CREAT SERPL: 28.8 (ref 7–25)
CALCIUM SPEC-SCNC: 9.9 MG/DL (ref 8.6–10.5)
CHLORIDE SERPL-SCNC: 98 MMOL/L (ref 98–107)
CO2 SERPL-SCNC: 29 MMOL/L (ref 22–29)
CREAT SERPL-MCNC: 0.59 MG/DL (ref 0.57–1)
EGFRCR SERPLBLD CKD-EPI 2021: 94.7 ML/MIN/1.73
GLUCOSE SERPL-MCNC: 102 MG/DL (ref 65–99)
POTASSIUM SERPL-SCNC: 4.5 MMOL/L (ref 3.5–5.2)
SODIUM SERPL-SCNC: 138 MMOL/L (ref 136–145)

## 2023-04-03 ENCOUNTER — HOSPITAL ENCOUNTER (OUTPATIENT)
Dept: BONE DENSITY | Facility: HOSPITAL | Age: 73
Discharge: HOME | End: 2023-04-03
Admitting: INTERNAL MEDICINE
Payer: MEDICARE

## 2023-04-03 DIAGNOSIS — Z78.0 MENOPAUSE: Chronic | ICD-10-CM

## 2023-04-03 PROCEDURE — 77080 DXA BONE DENSITY AXIAL: CPT

## 2023-04-10 ENCOUNTER — TELEPHONE (OUTPATIENT)
Dept: INTERNAL MEDICINE | Facility: CLINIC | Age: 73
End: 2023-04-10
Payer: MEDICARE

## 2023-04-10 NOTE — TELEPHONE ENCOUNTER
PATIENT SCHEDULED FOR TOMORROW, 04/11 AT  FOR AN U/S GUIDED BREAST BX.  THEY SAID THEY FAXED AN ORDER OVER LAST WEEK TO BE SIGNED BY DR LOBO AND SENT BACK.  I EXPLAINED SHE WAS OUT OF THE OFFICE LAST WEEK, BUT I WOULD SEND A REMINDER ASKING FOR THAT TO BE FAXED BACK TO THEM [phone] ASAP.  IF THEY DON'T RECEIVE IT BY THIS AFTERNOON, THEY WILL CANCEL HER APPOINTMENT.

## 2023-04-10 NOTE — TELEPHONE ENCOUNTER
Dr. Stack just signed this AM since she was out of the office last week. Order has been faxed to [facility]

## 2023-06-23 PROBLEM — C50.412 MALIGNANT NEOPLASM OF UPPER-OUTER QUADRANT OF LEFT BREAST IN FEMALE, ESTROGEN RECEPTOR NEGATIVE: Status: ACTIVE | Noted: 2023-04-18

## 2023-06-23 PROBLEM — Z17.1 MALIGNANT NEOPLASM OF UPPER-OUTER QUADRANT OF LEFT BREAST IN FEMALE, ESTROGEN RECEPTOR NEGATIVE: Status: ACTIVE | Noted: 2023-04-18

## 2023-07-14 PROBLEM — K58.0 IRRITABLE BOWEL SYNDROME WITH DIARRHEA: Status: ACTIVE | Noted: 2023-07-14

## 2023-07-14 PROBLEM — K58.0 IRRITABLE BOWEL SYNDROME WITH DIARRHEA: Chronic | Status: ACTIVE | Noted: 2023-07-14

## 2023-07-14 PROBLEM — Z17.1 MALIGNANT NEOPLASM OF UPPER-OUTER QUADRANT OF LEFT BREAST IN FEMALE, ESTROGEN RECEPTOR NEGATIVE: Chronic | Status: ACTIVE | Noted: 2023-04-18

## 2023-07-14 PROBLEM — C50.412 MALIGNANT NEOPLASM OF UPPER-OUTER QUADRANT OF LEFT BREAST IN FEMALE, ESTROGEN RECEPTOR NEGATIVE: Chronic | Status: ACTIVE | Noted: 2023-04-18

## 2023-09-21 PROBLEM — T45.1X5A ANEMIA ASSOCIATED WITH CHEMOTHERAPY: Status: ACTIVE | Noted: 2023-09-21

## 2023-09-21 PROBLEM — D64.81 ANEMIA ASSOCIATED WITH CHEMOTHERAPY: Status: ACTIVE | Noted: 2023-09-21

## 2023-09-21 NOTE — ASSESSMENT & PLAN NOTE
With diarrhea-predom IBS; s/p course of Xifaxan in 7/23 per Dr. Knight - pt notes inability to tolerate TID dosing but found success with BID dosing, med recently completed; remains on amitriptyline 10mg QHS (no change with 20mg dose)

## 2023-09-21 NOTE — ASSESSMENT & PLAN NOTE
Recommend f/u on anemia, which seems to have occurred after starting chemo for breast CA; labs monitored by [facility] onc Dr. Lowery

## 2023-09-21 NOTE — PROGRESS NOTES
"Chief Complaint   Patient presents with    Impaired Fasting Glucose       History of Present Illness  73 y.o.  woman presents for sugar follow-up.    Has been undergoing chemotherapy for breast cancer.  Notes that she actually does not have a lot of nausea.  Appetite has been stable.  Notes fatigue; was not told she was anemic.  Was told to wait on the flu vaccine until she finishes chemotherapy in the next few weeks.    Regarding her abdominal pain, notes no significant improvement with increase of amitriptyline from 10 mg to 20 mg; thus, she is back on the 10 mg dose.  Dr. Knight prescribed Xifaxan 500mg TID, which patient states resulted in severe hand pain as well as rawness of her mucus.  She then retried the medication BID and notes not only ability to tolerate the medication but also had decreased abdominal pain.  She just finished the Xifaxin with BID dosing and is still having decreased abd pain.    Review of Systems  ROS (+) for fatigue, decreased energy, which patient attributes to chemotherapy.  Has also lost all her hair.  Has been wearing mask in public's vaccines are currently delayed due to chemotherapy.  Reports decreased abdominal pain after course of Xifaxan.  All other ROS reviewed and negative.      Current Outpatient Medications:     amitriptyline (ELAVIL) 10 MG QHS    Cholecalciferol (VITAMIN D) 1000 UNITS QD    multivitamin QD    psyllium (METAMUCIL) 0.52 g QD    tolterodine LA (DETROL LA) 4 MG QD    VITALS:  /66   Pulse 92   Ht 158.8 cm (62.5\")   Wt 60.4 kg (133 lb 3.2 oz)   SpO2 97%   BMI 23.97 kg/m²     Physical Exam  Vitals and nursing note reviewed.   Constitutional:       General: She is not in acute distress.     Appearance: Normal appearance. She is not ill-appearing.   Eyes:      Extraocular Movements: Extraocular movements intact.      Conjunctiva/sclera: Conjunctivae normal.   Pulmonary:      Effort: Pulmonary effort is normal. No respiratory distress. "   Neurological:      Mental Status: She is alert. Mental status is at baseline.   Psychiatric:         Mood and Affect: Mood normal.         Behavior: Behavior normal.       LABS  Results for orders placed or performed in visit on 09/22/23   POC Glycosylated Hemoglobin (Hb A1C)    Specimen: Blood   Result Value Ref Range    Hemoglobin A1C 6.4 %    Lot Number [id number]     Expiration Date 05/10/2025      6/7/23 CBC with H&H 10.3/32.3; stable CMP    3/13/23 A1C 6.2    9/16/22 A1C 6.0    ASSESSMENT/PLAN    Diagnoses and all orders for this visit:    1. Impaired fasting glucose (Primary)  Assessment & Plan:  BG control borderline with A1C 6.4; note stress of ongoing chemo for breast CA; encouraged reg phys activity to decr insulin resistance, moderation in unhealthy starches/sweets; f/u A1C in 6 mos      Orders:  -     POC Glycosylated Hemoglobin (Hb A1C)    2. Irritable bowel syndrome with diarrhea  Assessment & Plan:  With diarrhea-predom IBS; s/p course of Xifaxan in 7/23 per Dr. Knight; remains on amitriptyline 10mg QHS (no change with 20mg dose)      3. Left lateral abdominal pain  Assessment & Plan:  With diarrhea-predom IBS; s/p course of Xifaxan in 7/23 per Dr. Knight - pt notes inability to tolerate TID dosing but found success with BID dosing, med recently completed; remains on amitriptyline 10mg QHS (no change with 20mg dose)      4. Anemia associated with chemotherapy  Assessment & Plan:  Recommend f/u on anemia, which seems to have occurred after starting chemo for breast CA; labs monitored by [facility] onc Dr. Lowery      5. Vaccine counseling  Comments:  strongly rec getting flu vacc, RSV vacc, and COVID19 vacc as she is considered immunocompromised with ongoing breast CA tx        FOLLOW-UP  Health maintenance - rec flu vacc, RSV vacc, and COVID19 vacc,  counseling given and defer timing to [facility] oncologist  RTC for next wellness 3/25/24; fasting labs prior to appt (CBC, CMP, TSH, lipids, UA/micro, A1C,  FT4)    Electronically signed by:    Mirna Stack MD, FACP  09/22/2023    EMR Dragon/Transcription Utilization  Please note that portions of this note were completed with a voice recognition program.

## 2023-09-21 NOTE — ASSESSMENT & PLAN NOTE
BG control borderline with A1C 6.4; note stress of ongoing chemo for breast CA; encouraged reg phys activity to decr insulin resistance, moderation in unhealthy starches/sweets; f/u A1C in 6 mos

## 2023-09-21 NOTE — ASSESSMENT & PLAN NOTE
With diarrhea-predom IBS; s/p course of Xifaxan in 7/23 per Dr. Knight; remains on amitriptyline 10mg QHS (no change with 20mg dose)

## 2023-09-22 ENCOUNTER — OFFICE VISIT (OUTPATIENT)
Dept: INTERNAL MEDICINE | Facility: CLINIC | Age: 73
End: 2023-09-22
Payer: MEDICARE

## 2023-09-22 VITALS
SYSTOLIC BLOOD PRESSURE: 124 MMHG | BODY MASS INDEX: 23.6 KG/M2 | OXYGEN SATURATION: 97 % | HEART RATE: 92 BPM | WEIGHT: 133.2 LBS | HEIGHT: 63 IN | DIASTOLIC BLOOD PRESSURE: 66 MMHG

## 2023-09-22 DIAGNOSIS — R73.01 IMPAIRED FASTING GLUCOSE: Primary | Chronic | ICD-10-CM

## 2023-09-22 DIAGNOSIS — R10.9 LEFT LATERAL ABDOMINAL PAIN: Chronic | ICD-10-CM

## 2023-09-22 DIAGNOSIS — Z71.85 VACCINE COUNSELING: ICD-10-CM

## 2023-09-22 DIAGNOSIS — T45.1X5A ANEMIA ASSOCIATED WITH CHEMOTHERAPY: ICD-10-CM

## 2023-09-22 DIAGNOSIS — K58.0 IRRITABLE BOWEL SYNDROME WITH DIARRHEA: Chronic | ICD-10-CM

## 2023-09-22 DIAGNOSIS — D64.81 ANEMIA ASSOCIATED WITH CHEMOTHERAPY: ICD-10-CM

## 2023-09-22 LAB
EXPIRATION DATE: NORMAL
HBA1C MFR BLD: 6.4 %
Lab: NORMAL

## 2024-02-14 DIAGNOSIS — Z00.00 MEDICARE ANNUAL WELLNESS VISIT, SUBSEQUENT: Primary | Chronic | ICD-10-CM

## 2024-02-14 DIAGNOSIS — E78.00 PURE HYPERCHOLESTEROLEMIA: Chronic | ICD-10-CM

## 2024-02-14 DIAGNOSIS — R73.01 IMPAIRED FASTING GLUCOSE: Chronic | ICD-10-CM

## 2024-02-14 DIAGNOSIS — R94.6 ABNORMAL FINDING ON THYROID FUNCTION TEST: Chronic | ICD-10-CM

## 2024-02-29 DIAGNOSIS — R10.9 LEFT LATERAL ABDOMINAL PAIN: ICD-10-CM

## 2024-02-29 RX ORDER — AMITRIPTYLINE HYDROCHLORIDE 10 MG/1
10 TABLET, FILM COATED ORAL NIGHTLY
Qty: 90 TABLET | Refills: 2 | OUTPATIENT
Start: 2024-02-29

## 2024-03-02 ENCOUNTER — APPOINTMENT (OUTPATIENT)
Dept: CT IMAGING | Facility: HOSPITAL | Age: 74
End: 2024-03-02
Payer: MEDICARE

## 2024-03-02 ENCOUNTER — HOSPITAL ENCOUNTER (EMERGENCY)
Facility: HOSPITAL | Age: 74
Discharge: HOME | End: 2024-03-02
Attending: EMERGENCY MEDICINE
Payer: MEDICARE

## 2024-03-02 ENCOUNTER — APPOINTMENT (OUTPATIENT)
Dept: GENERAL RADIOLOGY | Facility: HOSPITAL | Age: 74
End: 2024-03-02
Payer: MEDICARE

## 2024-03-02 VITALS
WEIGHT: 132 LBS | DIASTOLIC BLOOD PRESSURE: 67 MMHG | HEART RATE: 78 BPM | SYSTOLIC BLOOD PRESSURE: 126 MMHG | RESPIRATION RATE: 14 BRPM | HEIGHT: 62 IN | OXYGEN SATURATION: 98 % | TEMPERATURE: 97.9 F | BODY MASS INDEX: 24.29 KG/M2

## 2024-03-02 DIAGNOSIS — R42 DIZZINESS: Primary | ICD-10-CM

## 2024-03-02 LAB
ALBUMIN SERPL-MCNC: 4.8 G/DL (ref 3.5–5.2)
ALBUMIN/GLOB SERPL: 1.8 G/DL
ALP SERPL-CCNC: 79 U/L (ref 39–117)
ALT SERPL W P-5'-P-CCNC: 9 U/L (ref 1–33)
ANION GAP SERPL CALCULATED.3IONS-SCNC: 8 MMOL/L (ref 5–15)
AST SERPL-CCNC: 19 U/L (ref 1–32)
BACTERIA UR QL AUTO: NORMAL /HPF
BASOPHILS # BLD AUTO: 0.05 10*3/MM3 (ref 0–0.2)
BASOPHILS NFR BLD AUTO: 0.8 % (ref 0–1.5)
BILIRUB SERPL-MCNC: 0.2 MG/DL (ref 0–1.2)
BILIRUB UR QL STRIP: NEGATIVE
BUN SERPL-MCNC: 15 MG/DL (ref 8–23)
BUN/CREAT SERPL: 28.3 (ref 7–25)
CALCIUM SPEC-SCNC: 9.3 MG/DL (ref 8.6–10.5)
CHLORIDE SERPL-SCNC: 99 MMOL/L (ref 98–107)
CLARITY UR: CLEAR
CO2 SERPL-SCNC: 30 MMOL/L (ref 22–29)
COLOR UR: YELLOW
CREAT SERPL-MCNC: 0.53 MG/DL (ref 0.57–1)
DEPRECATED RDW RBC AUTO: 49.2 FL (ref 37–54)
EGFRCR SERPLBLD CKD-EPI 2021: 96.6 ML/MIN/1.73
EOSINOPHIL # BLD AUTO: 0.31 10*3/MM3 (ref 0–0.4)
EOSINOPHIL NFR BLD AUTO: 5.2 % (ref 0.3–6.2)
ERYTHROCYTE [DISTWIDTH] IN BLOOD BY AUTOMATED COUNT: 14.8 % (ref 12.3–15.4)
GLOBULIN UR ELPH-MCNC: 2.6 GM/DL
GLUCOSE BLDC GLUCOMTR-MCNC: 125 MG/DL (ref 70–130)
GLUCOSE SERPL-MCNC: 109 MG/DL (ref 65–99)
GLUCOSE UR STRIP-MCNC: NEGATIVE MG/DL
HCT VFR BLD AUTO: 39.4 % (ref 34–46.6)
HGB BLD-MCNC: 13.1 G/DL (ref 12–15.9)
HGB UR QL STRIP.AUTO: NEGATIVE
HOLD SPECIMEN: NORMAL
HYALINE CASTS UR QL AUTO: NORMAL /LPF
IMM GRANULOCYTES # BLD AUTO: 0.02 10*3/MM3 (ref 0–0.05)
IMM GRANULOCYTES NFR BLD AUTO: 0.3 % (ref 0–0.5)
KETONES UR QL STRIP: NEGATIVE
LEUKOCYTE ESTERASE UR QL STRIP.AUTO: ABNORMAL
LYMPHOCYTES # BLD AUTO: 1.38 10*3/MM3 (ref 0.7–3.1)
LYMPHOCYTES NFR BLD AUTO: 23.2 % (ref 19.6–45.3)
MAGNESIUM SERPL-MCNC: 2.3 MG/DL (ref 1.6–2.4)
MCH RBC QN AUTO: 31.8 PG (ref 26.6–33)
MCHC RBC AUTO-ENTMCNC: 33.2 G/DL (ref 31.5–35.7)
MCV RBC AUTO: 95.6 FL (ref 79–97)
MONOCYTES # BLD AUTO: 0.67 10*3/MM3 (ref 0.1–0.9)
MONOCYTES NFR BLD AUTO: 11.3 % (ref 5–12)
NEUTROPHILS NFR BLD AUTO: 3.52 10*3/MM3 (ref 1.7–7)
NEUTROPHILS NFR BLD AUTO: 59.2 % (ref 42.7–76)
NITRITE UR QL STRIP: NEGATIVE
NRBC BLD AUTO-RTO: 0 /100 WBC (ref 0–0.2)
PH UR STRIP.AUTO: 6.5 [PH] (ref 5–8)
PLATELET # BLD AUTO: 281 10*3/MM3 (ref 140–450)
PMV BLD AUTO: 8.7 FL (ref 6–12)
POTASSIUM SERPL-SCNC: 4.1 MMOL/L (ref 3.5–5.2)
PROT SERPL-MCNC: 7.4 G/DL (ref 6–8.5)
PROT UR QL STRIP: NEGATIVE
QT INTERVAL: 354 MS
QTC INTERVAL: 421 MS
RBC # BLD AUTO: 4.12 10*6/MM3 (ref 3.77–5.28)
RBC # UR STRIP: NORMAL /HPF
REF LAB TEST METHOD: NORMAL
SODIUM SERPL-SCNC: 137 MMOL/L (ref 136–145)
SP GR UR STRIP: 1.01 (ref 1–1.03)
SQUAMOUS #/AREA URNS HPF: NORMAL /HPF
TROPONIN T SERPL HS-MCNC: 12 NG/L
UROBILINOGEN UR QL STRIP: ABNORMAL
WBC # UR STRIP: NORMAL /HPF
WBC NRBC COR # BLD AUTO: 5.95 10*3/MM3 (ref 3.4–10.8)
WHOLE BLOOD HOLD COAG: NORMAL
WHOLE BLOOD HOLD SPECIMEN: NORMAL

## 2024-03-02 PROCEDURE — 82948 REAGENT STRIP/BLOOD GLUCOSE: CPT

## 2024-03-02 PROCEDURE — 99284 EMERGENCY DEPT VISIT MOD MDM: CPT

## 2024-03-02 PROCEDURE — 81001 URINALYSIS AUTO W/SCOPE: CPT | Performed by: EMERGENCY MEDICINE

## 2024-03-02 PROCEDURE — 85025 COMPLETE CBC W/AUTO DIFF WBC: CPT | Performed by: EMERGENCY MEDICINE

## 2024-03-02 PROCEDURE — 84484 ASSAY OF TROPONIN QUANT: CPT | Performed by: EMERGENCY MEDICINE

## 2024-03-02 PROCEDURE — 71045 X-RAY EXAM CHEST 1 VIEW: CPT

## 2024-03-02 PROCEDURE — 80053 COMPREHEN METABOLIC PANEL: CPT | Performed by: EMERGENCY MEDICINE

## 2024-03-02 PROCEDURE — 70450 CT HEAD/BRAIN W/O DYE: CPT

## 2024-03-02 PROCEDURE — 83735 ASSAY OF MAGNESIUM: CPT | Performed by: EMERGENCY MEDICINE

## 2024-03-02 PROCEDURE — 93005 ELECTROCARDIOGRAM TRACING: CPT | Performed by: EMERGENCY MEDICINE

## 2024-03-02 PROCEDURE — 93005 ELECTROCARDIOGRAM TRACING: CPT

## 2024-03-02 RX ORDER — MECLIZINE HYDROCHLORIDE 25 MG/1
25 TABLET ORAL 3 TIMES DAILY PRN
Qty: 15 TABLET | Refills: 0 | Status: SHIPPED | OUTPATIENT
Start: 2024-03-02

## 2024-03-02 RX ORDER — MECLIZINE HYDROCHLORIDE 25 MG/1
25 TABLET ORAL ONCE
Status: COMPLETED | OUTPATIENT
Start: 2024-03-02 | End: 2024-03-02

## 2024-03-02 RX ORDER — SODIUM CHLORIDE 0.9 % (FLUSH) 0.9 %
10 SYRINGE (ML) INJECTION AS NEEDED
Status: DISCONTINUED | OUTPATIENT
Start: 2024-03-02 | End: 2024-03-02 | Stop reason: HOSPADM

## 2024-03-02 RX ADMIN — MECLIZINE HYDROCHLORIDE 25 MG: 25 TABLET ORAL at 12:08

## 2024-03-02 NOTE — ED PROVIDER NOTES
EMERGENCY DEPARTMENT ENCOUNTER      Pt Name: Natalia Estrada  MRN: [id number]  YOB: 1950  Date of evaluation: 3/2/2024  Provider: Brandyn Berry DO    CHIEF COMPLAINT       Chief Complaint   Patient presents with    Dizziness       HPI  Stated Reason for Visit: pt presents from home c/o dizziness, unsteady gait, went to [facility] and they sent her here, hx of breast cancer History Obtained From: patient       HISTORY OF PRESENT ILLNESS  (Location/Symptom, Timing/Onset, Context/Setting, Quality, Duration, Modifying Factors, Severity.)   Natalia Estrada is a 73 y.o. female who presents to the emergency department as referral from urgent treatment center with a concern for dizziness off balance which she notes started earlier this morning.  She went to urgent treatment center and was sent here for further assessment.  She does have a history of previous left breast cancer and she has undergone chemotherapy and radiation therapy which she finished on January 5.  She follows with hematology/oncology through the [facility] cancer center at [facility].  No prior history of significant vertiginous symptoms, she notes with head changes positional changes seems to exacerbate her underlying dizziness, feeling the room is spinning.  She denies any headache or vision changes, no neck pain or stiffness.  Denies any unilateral weakness, numbness or tingling.  No prior history of vertigo, she denies any recent illness, no fever, chills, nausea vomiting or diarrhea.  No chest pain palpitations, difficulty breathing.  At rest she notes she feels like she is at her baseline.  She did have her port removed that she finished up with her chemotherapy a few weeks ago.  No other acute systemic complaints.      Nursing notes were reviewed.      PAST MEDICAL HISTORY     Past Medical History:   Diagnosis Date    Abdominal pain, left lower quadrant 04/29/2016    Impression: 3/2/17 normal abd CT (except mild interstitial  scarring lung bases); 11/14/2014 - ddx includes colon spasm, diverticulitis, less likely reflux, gastritis, pancreatitis, GYN etiology (h/o YULIYA/BSO but note (+)FH ovarian CA); with exam findings and hx, proceed with trial of SL levsin; if no better, plan to check labs and CT for further evaluation; note last colonoscopy done 2010 (to be re    Allergic 1990    Arthritis 2003    Breast cancer 04/17/2023    Cataract 2017    Surgery in 2021    Colon polyp 1994    Diarrhea 04/29/2016    Impression: 05/22/2014 - reassuring that sxs come/go; discontinue stool softener until resolution of sxs; monitor for food triggers; add fiber supplement 1x/day;  consider addition of probiotic but rec 30d trial if she proceeds; f/u if sxs do not resolve;     Diverticulosis 1999    Hx of AAA ultrasound 02/28/2014    neg AAA ultrasound (2/28/14)    Hx of bone density study 03/07/2014    DEXA (3/7/14): L -1.4, H -1.6    Hx of bone density study 03/16/2017    DEXA (3/16/17) L -1.7, H -1.3, repeat 3 yrs    Hx of bone density study 07/31/2020    DEXA (7/31/20): L -1.6, H -1.3, repeat 3 yrs    Hx of bone density study 04/03/2023    DEXA (4/3/23) L -1.8, H -1.3; repeat 3 yrs    Hx of chest x-ray 12/09/2021    nl CXR (12/9/21) except for degen changes and scoliosis    Hx of colonoscopy 04/18/2016    colonosc (4/18/16): hyperplastic polyp, diverticulosis, repeat 10 yrs; GI - Dr. Knight    Hx of echocardiogram 03/16/2014    ECHO (2/17): mild concentric LVH and diastolic dysfunction, mild MR/TR/AR    Hx of echocardiogram 04/25/2023    ECHO (4/25/23, ): EF 66%, mild MR/AR, tr TR/OK, bord RVSP, thickened MV chordae    Hx of pelvic ultrasound 02/05/2020    nl pelvic u/s (2/5/20); ovaries absent    Hx of thyroid ultrasound 01/30/2018    nl thyr u/s (1/30/18)    Hyperlipidemia 1989    HDL is very high    Irritable bowel syndrome 1995    Macular degeneration     One eye         SURGICAL HISTORY       Past Surgical History:   Procedure Laterality Date     BILATERAL OOPHORECTOMY  02/2003    due to (+)FH ovarian CA; GYN - Dr. Resendiz    BREAST SURGERY  1971    CATARACT EXTRACTION Bilateral     COLONOSCOPY  2016    CYST REMOVAL      toe, right middle     FOOT SURGERY  Nov. 2016    INCISIONAL BREAST BIOPSY      x2 (5/71 and 12/81)surg - Dr. Dawson    JOINT REPLACEMENT  Aug 2, 2021    Right hip    TOTAL HIP ARTHROPLASTY Right 08/02/2021    Procedure: TOTAL HIP ARTHROPLASTY RIGHT;  Surgeon: Edwardo Diana MD;  Location: [facility];  Service: Orthopedics;  Laterality: Right;    TUBAL ABDOMINAL LIGATION  1984         CURRENT MEDICATIONS       Current Facility-Administered Medications:     sodium chloride 0.9 % flush 10 mL, 10 mL, Intravenous, PRN, Pacittsaray, Brandyn Raymundo, DO    Current Outpatient Medications:     amitriptyline (ELAVIL) 10 MG tablet, Take 1 tablet by mouth Every Night., Disp: 90 tablet, Rfl: 3    capecitabine (XELODA) 500 MG chemo tablet, Take 3 tabs (1500 mg) twice daily Daily x 14 days, then 7 days off, Disp: , Rfl:     Cholecalciferol (VITAMIN D) 1000 UNITS tablet, Take 1 tablet by mouth Daily., Disp: , Rfl:     meclizine (ANTIVERT) 25 MG tablet, Take 1 tablet by mouth 3 (Three) Times a Day As Needed for Dizziness., Disp: 15 tablet, Rfl: 0    multivitamin with minerals tablet tablet, Take 1 tablet by mouth Daily., Disp: , Rfl:     psyllium (METAMUCIL) 0.52 g capsule, Take 1 capsule by mouth Daily As Needed for Constipation., Disp: , Rfl:     Ranibizumab 0.5 MG/0.05ML solution, 0.5 mg by Intravitreal route Every 2 (Two) Months., Disp: , Rfl:     tolterodine LA (DETROL LA) 4 MG 24 hr capsule, Take 1 capsule by mouth Daily As Needed (over active bladder, only takes when traveling)., Disp: 30 capsule, Rfl: 1    ALLERGIES     Erythromycin and Sulfa antibiotics    FAMILY HISTORY       Family History   Problem Relation Age of Onset    Ovarian cancer Mother 85    Arthritis Mother     Cancer Mother     Hyperlipidemia Mother     Alzheimer's disease Father      Goiter Sister     Heart attack Sister         tob    Stroke Sister     Alpha-1 antitrypsin deficiency Sister         carrier    Arthritis Sister     Hyperlipidemia Sister     Cancer Sister     Alzheimer's disease Sister     Thyroid nodules Sister     Rheum arthritis Sister     Alpha-1 antitrypsin deficiency Sister         carrier    Ovarian cancer Sister 51    Hypothyroidism Sister     Cancer Sister     Breast cancer Sister 65        triple neg    Cancer Sister     Heart attack Brother     Breast cancer Maternal Aunt     Alpha-1 antitrypsin deficiency Grandchild         carrier          SOCIAL HISTORY       Social History     Socioeconomic History    Marital status:    Tobacco Use    Smoking status: Never     Passive exposure: Never    Smokeless tobacco: Never   Vaping Use    Vaping status: Never Used   Substance and Sexual Activity    Alcohol use: Never    Drug use: Never    Sexual activity: Yes     Partners: Male     Birth control/protection: Post-menopausal, Tubal ligation     Comment: spouse         PHYSICAL EXAM    (up to 7 for level 4, 8 or more for level 5)     Vitals:    03/02/24 1111 03/02/24 1130 03/02/24 1200 03/02/24 1230   BP:  121/66 119/74 126/72   BP Location:       Patient Position:       Pulse: 82 82 79 78   Resp:       Temp:       SpO2: 99% 98% 96% 100%   Weight:       Height:           Physical Exam  General : Patient is awake, alert, oriented, in no acute distress, nontoxic appearing  HEENT: Pupils are equally round, EOMI, conjunctivae clear, patient does have some mild but present fast beat nystagmus with right lateral ocular gaze.  Neck: Neck is supple, full range of motion, trachea midline  Cardiac: Heart regular rate, rhythm, no murmurs, rubs, or gallops  Lungs: Lungs are clear to auscultation, there is no wheezing, rhonchi, or rales. There is no use of accessory muscles  Abdomen: Abdomen is soft, nontender, nondistended. There are no firm or pulsatile masses, no rebound rigidity or  guarding  Musculoskeletal: 5 out of 5 strength in all 4 extremities.  No focal muscle deficits are appreciated  Neuro: Patient awake alert answer question appropriately, no slurred speech, no facial droop, 5 out of 5 strength bilateral upper and lower extremities, no dysdiadochokinesia.  No sensory deficit, no focal neurological deficit examination.  Motor intact, sensory intact, level of consciousness is normal, cerebellar function is normal, reflexes are grossly normal. No evidence of incontinence or loss of bowel or bladder function, no saddle anesthesia noted   Dermatology: Skin is warm and dry  Psych: Mentation is grossly normal, cognition is grossly normal. Affect is appropriate      DIAGNOSTIC RESULTS     EKG:  All EKGs are interpreted by the Emergency Department Physician who either signs or Co-signs this chart in the absence of a cardiologist.    ECG 12 Lead ED Triage Standing Order; Weak / Dizzy / AMS   Final Result   Test Reason : ED Triage Standing Order~   Blood Pressure :   */*   mmHG   Vent. Rate :  85 BPM     Atrial Rate :  85 BPM      P-R Int : 124 ms          QRS Dur :  68 ms       QT Int : 354 ms       P-R-T Axes :  32  42  39 degrees      QTc Int : 421 ms      Normal sinus rhythm   Normal ECG   No previous ECGs available   Confirmed by NISH GONSALES MD ([id number]) on 3/2/2024 11:56:54 AM      Referred By:            Confirmed By: NISH GONSALES MD          RADIOLOGY:     [x] Radiologist's Report Reviewed:  CT Head Without Contrast   Final Result   Impression:   1. No acute intracranial abnormality. Specifically, no evidence of acute hemorrhage, mass effect or midline shift.            Electronically Signed: Thad Bella     3/2/2024 1:09 PM EST     Workstation ID: [id number]      XR Chest 1 View   Final Result   Impression:   No active pulmonary process.         Electronically Signed: Scott Nolan MD     3/2/2024 11:36 AM EST     Workstation ID: [id number]          I ordered and independently  reviewed the above noted radiographic studies.      I viewed images of chest x-ray which showed no acute cardiopulmonary process per my independent interpretation.    See radiologist's dictation for official interpretation.      ED BEDSIDE ULTRASOUND:   Performed by ED Physician - none    LABS:    I have reviewed and interpreted all of the currently available lab results from this visit (if applicable):  Results for orders placed or performed during the hospital encounter of 03/02/24   Comprehensive Metabolic Panel    Specimen: Blood   Result Value Ref Range    Glucose 109 (H) 65 - 99 mg/dL    BUN 15 8 - 23 mg/dL    Creatinine 0.53 (L) 0.57 - 1.00 mg/dL    Sodium 137 136 - 145 mmol/L    Potassium 4.1 3.5 - 5.2 mmol/L    Chloride 99 98 - 107 mmol/L    CO2 30.0 (H) 22.0 - 29.0 mmol/L    Calcium 9.3 8.6 - 10.5 mg/dL    Total Protein 7.4 6.0 - 8.5 g/dL    Albumin 4.8 3.5 - 5.2 g/dL    ALT (SGPT) 9 1 - 33 U/L    AST (SGOT) 19 1 - 32 U/L    Alkaline Phosphatase 79 39 - 117 U/L    Total Bilirubin 0.2 0.0 - 1.2 mg/dL    Globulin 2.6 gm/dL    A/G Ratio 1.8 g/dL    BUN/Creatinine Ratio 28.3 (H) 7.0 - 25.0    Anion Gap 8.0 5.0 - 15.0 mmol/L    eGFR 96.6 >60.0 mL/min/1.73   Single High Sensitivity Troponin T    Specimen: Blood   Result Value Ref Range    HS Troponin T 12 <14 ng/L   Magnesium    Specimen: Blood   Result Value Ref Range    Magnesium 2.3 1.6 - 2.4 mg/dL   Urinalysis With Microscopic If Indicated (No Culture) - Urine, Clean Catch    Specimen: Urine, Clean Catch   Result Value Ref Range    Color, UA Yellow Yellow, Straw    Appearance, UA Clear Clear    pH, UA 6.5 5.0 - 8.0    Specific Gravity, UA 1.012 1.001 - 1.030    Glucose, UA Negative Negative    Ketones, UA Negative Negative    Bilirubin, UA Negative Negative    Blood, UA Negative Negative    Protein, UA Negative Negative    Leuk Esterase, UA Trace (A) Negative    Nitrite, UA Negative Negative    Urobilinogen, UA 0.2 E.U./dL 0.2 - 1.0 E.U./dL   CBC Auto  Differential    Specimen: Blood   Result Value Ref Range    WBC 5.95 3.40 - 10.80 10*3/mm3    RBC 4.12 3.77 - 5.28 10*6/mm3    Hemoglobin 13.1 12.0 - 15.9 g/dL    Hematocrit 39.4 34.0 - 46.6 %    MCV 95.6 79.0 - 97.0 fL    MCH 31.8 26.6 - 33.0 pg    MCHC 33.2 31.5 - 35.7 g/dL    RDW 14.8 12.3 - 15.4 %    RDW-SD 49.2 37.0 - 54.0 fl    MPV 8.7 6.0 - 12.0 fL    Platelets 281 140 - 450 10*3/mm3    Neutrophil % 59.2 42.7 - 76.0 %    Lymphocyte % 23.2 19.6 - 45.3 %    Monocyte % 11.3 5.0 - 12.0 %    Eosinophil % 5.2 0.3 - 6.2 %    Basophil % 0.8 0.0 - 1.5 %    Immature Grans % 0.3 0.0 - 0.5 %    Neutrophils, Absolute 3.52 1.70 - 7.00 10*3/mm3    Lymphocytes, Absolute 1.38 0.70 - 3.10 10*3/mm3    Monocytes, Absolute 0.67 0.10 - 0.90 10*3/mm3    Eosinophils, Absolute 0.31 0.00 - 0.40 10*3/mm3    Basophils, Absolute 0.05 0.00 - 0.20 10*3/mm3    Immature Grans, Absolute 0.02 0.00 - 0.05 10*3/mm3    nRBC 0.0 0.0 - 0.2 /100 WBC   Urinalysis, Microscopic Only - Urine, Clean Catch    Specimen: Urine, Clean Catch   Result Value Ref Range    RBC, UA 0-2 None Seen, 0-2 /HPF    WBC, UA 0-2 None Seen, 0-2 /HPF    Bacteria, UA None Seen None Seen, Trace /HPF    Squamous Epithelial Cells, UA None Seen None Seen, 0-2 /HPF    Hyaline Casts, UA None Seen 0 - 6 /LPF    Methodology Automated Microscopy    POC Glucose Once    Specimen: Blood   Result Value Ref Range    Glucose 125 70 - 130 mg/dL   ECG 12 Lead ED Triage Standing Order; Weak / Dizzy / AMS   Result Value Ref Range    QT Interval 354 ms    QTC Interval 421 ms   Green Top (Gel)   Result Value Ref Range    Extra Tube Hold for add-ons.    Lavender Top   Result Value Ref Range    Extra Tube hold for add-on    Gold Top - SST   Result Value Ref Range    Extra Tube Hold for add-ons.    Light Blue Top   Result Value Ref Range    Extra Tube Hold for add-ons.         If labs were ordered, I independently reviewed the results and considered them in treating the patient.      EMERGENCY  DEPARTMENT COURSE and DIFFERENTIAL DIAGNOSIS/MDM:   Vitals:  AS OF 14:28 EST    BP - 126/72  HR - 78  TEMP - 97.9 °F (36.6 °C)  O2 SATS - 100%      Orders placed during this visit:  Orders Placed This Encounter   Procedures    XR Chest 1 View    CT Head Without Contrast    [place] Draw    Comprehensive Metabolic Panel    Single High Sensitivity Troponin T    Magnesium    Urinalysis With Microscopic If Indicated (No Culture) - Urine, Clean Catch    CBC Auto Differential    Urinalysis, Microscopic Only - Urine, Clean Catch    NPO Diet NPO Type: Strict NPO    Undress & Gown    Continuous Pulse Oximetry    Vital Signs    Oxygen Therapy- Nasal Cannula; Titrate 1-6 LPM Per SpO2; 90 - 95%    POC Glucose Once    POC Glucose Once    ECG 12 Lead ED Triage Standing Order; Weak / Dizzy / AMS    Insert Peripheral IV    Fall Precautions    CBC & Differential    Green Top (Gel)    Lavender Top    Gold Top - SST    Gray Top    Light Blue Top       All labs have been independently reviewed by me.  All radiology studies have been reviewed by me and the radiologist dictating the report.  All EKG's have been independently viewed and interpreted by me.      Discussion below represents my analysis of pertinent findings related to patient's condition, differential diagnosis, treatment plan and final disposition.    Differential diagnosis:  The differential diagnosis associated with the patient's presentation includes: Vertigo, intracerebral mass, inner ear dysfunction, electrolyte abnormalities    Additional sources  Discussed/ obtained information from independent historians:   [x] Spouse  [] Parent  [] Family member  [] Friend  [] EMS   [] Other:    External (non-ED) record review:   [] Inpatient record:   [] Office record:   [] Outpatient record:   [] Prior Outpatient labs:   [] Prior Outpatient radiology:   [] Primary Care record:   [] Outside ED record:   [] Other:     Patient's care impacted by:   [] Diabetes  [] Hypertension  []  CHF  [] Hyperlipidemia  [] Coronary Artery Disease   [] COPD   [x] Cancer   [] Tobacco Abuse   [] Substance Abuse    [] Other:     Care significantly affected by Social Determinants of Health (housing and economic circumstances, unemployment)    [] Yes     [x] No   If yes, Patient's care significantly limited by Social Determinants of Health including:   [] Inadequate housing   [] Low income   [] Alcoholism and drug addiction in family   [] Problems related to primary support group   [] Unemployment   [] Problems related to employment   [] Other Social Determinants of Health:       MEDICATIONS ADMINISTERED IN ED:  Medications   sodium chloride 0.9 % flush 10 mL (has no administration in time range)   meclizine (ANTIVERT) tablet 25 mg (25 mg Oral Given 3/2/24 1208)              This a pleasant 73-year-old female who has had vertiginous symptoms since early this morning, positional changes are noted.  The patient is nontoxic-appearing, at rest she is stable without any focal neurological deficit on examination.  She does have some mild lateral nystagmus on assessment.  Her vital signs are stable.  Given her history of breast cancer we will obtain CT imaging of the brain, IV, labs obtained with results as above.  No infectious etiology on urinalysis.  White count is 5.9 with a stable H&H.  Her kidney function liver function and electrolytes are normal.  Cardiac function is normal.  CT head with no acute intracranial normality.  Patient able to get up and ambulate to the bathroom, her dizziness has improved after meclizine.  We discussed using the meclizine as needed, having her maintain good fluid hydration, Epley maneuver, close follow-up with her PCP for reevaluation, return to the ED with any worsening symptoms or further concerns in the meantime.    I had a discussion with the patient/family regarding diagnosis, diagnostic results, treatment plan, and medications.  The patient/family indicated understanding of  these instructions.  I spent adequate time at the bedside preceding discharge necessary to personally discuss the aftercare instructions, giving patient education, providing explanations of the results of our evaluations/findings, and my decision making to assure that the patient/family understand the plan of care.  Time was allotted to answer questions at that time and throughout the ED course.  Emphasis was placed on timely follow-up after discharge.  I also discussed the potential for the development of an acute emergent condition requiring further evaluation, admission, or even surgical intervention. I discussed that we found nothing during the visit today indicating the need for further workup, admission, or the presence of an unstable medical condition.  I encouraged the patient to return to the emergency department immediately for ANY concerns, worsening, new complaints, or if symptoms persist and unable to seek follow-up in a timely fashion.  The patient/family expressed understanding and agreement with this plan.  The patient will follow-up with their PCP in 1-2 days for reevaluation.     PROCEDURES:  Procedures    CRITICAL CARE TIME    Total Critical Care time was 0 minutes, excluding separately reportable procedures.   There was a high probability of clinically significant/life threatening deterioration in the patient's condition which required my urgent intervention.      FINAL IMPRESSION      1. Dizziness          DISPOSITION/PLAN     ED Disposition       ED Disposition   Discharge    Condition   Stable    Comment   --               PATIENT REFERRED TO:  Mirna Stack MD  [address] [facility] [address] [phone]    In 2 days      [facility] EMERGENCY DEPARTMENT  [address] [address] [phone]    If symptoms worsen      DISCHARGE MEDICATIONS:     Medication List        START taking these medications      meclizine 25 MG tablet  Commonly known as:  ANTIVERT  Take 1 tablet by mouth 3 (Three) Times a Day As Needed for Dizziness.            CONTINUE taking these medications      amitriptyline 10 MG tablet  Commonly known as: ELAVIL  Take 1 tablet by mouth Every Night.     capecitabine 500 MG chemo tablet  Commonly known as: XELODA     cholecalciferol 25 MCG (1000 UT) tablet  Commonly known as: VITAMIN D3     multivitamin with minerals tablet tablet     psyllium 0.52 g capsule  Commonly known as: METAMUCIL     Ranibizumab 0.5 MG/0.05ML solution     tolterodine LA 4 MG 24 hr capsule  Commonly known as: DETROL LA  Take 1 capsule by mouth Daily As Needed (over active bladder, only takes when traveling).               Where to Get Your Medications        These medications were sent to [facility] - [facility] [address] - [phone]  - [phone]  [address], [facility] [address]      Phone: [phone]   meclizine 25 MG tablet             Comment: Please note this report has been produced using speech recognition software.      Brandyn Berry DO  Attending Emergency Physician         Brandyn Berry DO  03/02/24 0174

## 2024-03-18 ENCOUNTER — LAB (OUTPATIENT)
Dept: LAB | Facility: HOSPITAL | Age: 74
End: 2024-03-18
Payer: MEDICARE

## 2024-03-18 DIAGNOSIS — R73.01 IMPAIRED FASTING GLUCOSE: Chronic | ICD-10-CM

## 2024-03-18 DIAGNOSIS — R94.6 ABNORMAL FINDING ON THYROID FUNCTION TEST: Chronic | ICD-10-CM

## 2024-03-18 DIAGNOSIS — E78.00 PURE HYPERCHOLESTEROLEMIA: Chronic | ICD-10-CM

## 2024-03-18 DIAGNOSIS — Z00.00 MEDICARE ANNUAL WELLNESS VISIT, SUBSEQUENT: ICD-10-CM

## 2024-03-18 LAB
ALBUMIN SERPL-MCNC: 4.2 G/DL (ref 3.5–5.2)
ALBUMIN/GLOB SERPL: 1.7 G/DL
ALP SERPL-CCNC: 85 U/L (ref 39–117)
ALT SERPL W P-5'-P-CCNC: 13 U/L (ref 1–33)
ANION GAP SERPL CALCULATED.3IONS-SCNC: 9.7 MMOL/L (ref 5–15)
AST SERPL-CCNC: 18 U/L (ref 1–32)
BACTERIA UR QL AUTO: NORMAL /HPF
BASOPHILS # BLD AUTO: 0.05 10*3/MM3 (ref 0–0.2)
BASOPHILS NFR BLD AUTO: 1.1 % (ref 0–1.5)
BILIRUB SERPL-MCNC: 0.4 MG/DL (ref 0–1.2)
BILIRUB UR QL STRIP: NEGATIVE
BUN SERPL-MCNC: 15 MG/DL (ref 8–23)
BUN/CREAT SERPL: 26.3 (ref 7–25)
CALCIUM SPEC-SCNC: 9.1 MG/DL (ref 8.6–10.5)
CHLORIDE SERPL-SCNC: 98 MMOL/L (ref 98–107)
CHOLEST SERPL-MCNC: 198 MG/DL (ref 0–200)
CLARITY UR: CLEAR
CO2 SERPL-SCNC: 28.3 MMOL/L (ref 22–29)
COLOR UR: YELLOW
CREAT SERPL-MCNC: 0.57 MG/DL (ref 0.57–1)
DEPRECATED RDW RBC AUTO: 52.9 FL (ref 37–54)
EGFRCR SERPLBLD CKD-EPI 2021: 94.9 ML/MIN/1.73
EOSINOPHIL # BLD AUTO: 0.22 10*3/MM3 (ref 0–0.4)
EOSINOPHIL NFR BLD AUTO: 5 % (ref 0.3–6.2)
ERYTHROCYTE [DISTWIDTH] IN BLOOD BY AUTOMATED COUNT: 16.1 % (ref 12.3–15.4)
GLOBULIN UR ELPH-MCNC: 2.5 GM/DL
GLUCOSE SERPL-MCNC: 98 MG/DL (ref 65–99)
GLUCOSE UR STRIP-MCNC: NEGATIVE MG/DL
HBA1C MFR BLD: 6.2 % (ref 4.8–5.6)
HCT VFR BLD AUTO: 34 % (ref 34–46.6)
HDLC SERPL-MCNC: 92 MG/DL (ref 40–60)
HGB BLD-MCNC: 11.4 G/DL (ref 12–15.9)
HGB UR QL STRIP.AUTO: NEGATIVE
HYALINE CASTS UR QL AUTO: NORMAL /LPF
IMM GRANULOCYTES # BLD AUTO: 0.02 10*3/MM3 (ref 0–0.05)
IMM GRANULOCYTES NFR BLD AUTO: 0.5 % (ref 0–0.5)
KETONES UR QL STRIP: NEGATIVE
LDLC SERPL CALC-MCNC: 93 MG/DL (ref 0–100)
LDLC/HDLC SERPL: 0.99 {RATIO}
LEUKOCYTE ESTERASE UR QL STRIP.AUTO: NEGATIVE
LYMPHOCYTES # BLD AUTO: 1.25 10*3/MM3 (ref 0.7–3.1)
LYMPHOCYTES NFR BLD AUTO: 28.6 % (ref 19.6–45.3)
MCH RBC QN AUTO: 31.8 PG (ref 26.6–33)
MCHC RBC AUTO-ENTMCNC: 33.5 G/DL (ref 31.5–35.7)
MCV RBC AUTO: 94.7 FL (ref 79–97)
MONOCYTES # BLD AUTO: 0.52 10*3/MM3 (ref 0.1–0.9)
MONOCYTES NFR BLD AUTO: 11.9 % (ref 5–12)
NEUTROPHILS NFR BLD AUTO: 2.31 10*3/MM3 (ref 1.7–7)
NEUTROPHILS NFR BLD AUTO: 52.9 % (ref 42.7–76)
NITRITE UR QL STRIP: NEGATIVE
NRBC BLD AUTO-RTO: 0 /100 WBC (ref 0–0.2)
PH UR STRIP.AUTO: 7 [PH] (ref 5–8)
PLATELET # BLD AUTO: 335 10*3/MM3 (ref 140–450)
PMV BLD AUTO: 9.1 FL (ref 6–12)
POTASSIUM SERPL-SCNC: 3.5 MMOL/L (ref 3.5–5.2)
PROT SERPL-MCNC: 6.7 G/DL (ref 6–8.5)
PROT UR QL STRIP: NEGATIVE
RBC # BLD AUTO: 3.59 10*6/MM3 (ref 3.77–5.28)
RBC # UR STRIP: NORMAL /HPF
REF LAB TEST METHOD: NORMAL
SODIUM SERPL-SCNC: 136 MMOL/L (ref 136–145)
SP GR UR STRIP: 1.02 (ref 1–1.03)
SQUAMOUS #/AREA URNS HPF: NORMAL /HPF
T4 FREE SERPL-MCNC: 1.21 NG/DL (ref 0.93–1.7)
TRIGL SERPL-MCNC: 73 MG/DL (ref 0–150)
TSH SERPL DL<=0.05 MIU/L-ACNC: 8.41 UIU/ML (ref 0.27–4.2)
UROBILINOGEN UR QL STRIP: NORMAL
VLDLC SERPL-MCNC: 13 MG/DL (ref 5–40)
WBC # UR STRIP: NORMAL /HPF
WBC NRBC COR # BLD AUTO: 4.37 10*3/MM3 (ref 3.4–10.8)

## 2024-03-18 PROCEDURE — 81001 URINALYSIS AUTO W/SCOPE: CPT

## 2024-03-18 PROCEDURE — 80053 COMPREHEN METABOLIC PANEL: CPT

## 2024-03-18 PROCEDURE — 84443 ASSAY THYROID STIM HORMONE: CPT

## 2024-03-18 PROCEDURE — 83036 HEMOGLOBIN GLYCOSYLATED A1C: CPT

## 2024-03-18 PROCEDURE — 84439 ASSAY OF FREE THYROXINE: CPT

## 2024-03-18 PROCEDURE — 85025 COMPLETE CBC W/AUTO DIFF WBC: CPT

## 2024-03-18 PROCEDURE — 80061 LIPID PANEL: CPT

## 2024-03-25 ENCOUNTER — OFFICE VISIT (OUTPATIENT)
Dept: INTERNAL MEDICINE | Facility: CLINIC | Age: 74
End: 2024-03-25
Payer: MEDICARE

## 2024-03-25 VITALS
HEART RATE: 90 BPM | DIASTOLIC BLOOD PRESSURE: 76 MMHG | BODY MASS INDEX: 24.14 KG/M2 | TEMPERATURE: 96.5 F | SYSTOLIC BLOOD PRESSURE: 120 MMHG | OXYGEN SATURATION: 98 % | WEIGHT: 131.2 LBS | HEIGHT: 62 IN

## 2024-03-25 DIAGNOSIS — Z00.00 MEDICARE ANNUAL WELLNESS VISIT, SUBSEQUENT: Primary | ICD-10-CM

## 2024-03-25 DIAGNOSIS — R94.6 ABNORMAL FINDING ON THYROID FUNCTION TEST: Chronic | ICD-10-CM

## 2024-03-25 DIAGNOSIS — T45.1X5A ANEMIA ASSOCIATED WITH CHEMOTHERAPY: ICD-10-CM

## 2024-03-25 DIAGNOSIS — R73.01 IMPAIRED FASTING GLUCOSE: Chronic | ICD-10-CM

## 2024-03-25 DIAGNOSIS — N39.41 URGE INCONTINENCE OF URINE: ICD-10-CM

## 2024-03-25 DIAGNOSIS — R10.9 LEFT LATERAL ABDOMINAL PAIN: ICD-10-CM

## 2024-03-25 DIAGNOSIS — Z23 NEED FOR COVID-19 VACCINE: ICD-10-CM

## 2024-03-25 DIAGNOSIS — H81.12 BENIGN PAROXYSMAL POSITIONAL VERTIGO OF LEFT EAR: ICD-10-CM

## 2024-03-25 DIAGNOSIS — Z71.89 ENCOUNTER FOR MEDICATION COUNSELING: ICD-10-CM

## 2024-03-25 DIAGNOSIS — E78.00 PURE HYPERCHOLESTEROLEMIA: Chronic | ICD-10-CM

## 2024-03-25 DIAGNOSIS — D64.81 ANEMIA ASSOCIATED WITH CHEMOTHERAPY: ICD-10-CM

## 2024-03-25 DIAGNOSIS — M85.89 OSTEOPENIA OF MULTIPLE SITES: Chronic | ICD-10-CM

## 2024-03-25 PROBLEM — D64.9 NORMOCYTIC ANEMIA: Status: ACTIVE | Noted: 2024-03-25

## 2024-03-25 RX ORDER — AMITRIPTYLINE HYDROCHLORIDE 10 MG/1
10 TABLET, FILM COATED ORAL NIGHTLY
Qty: 90 TABLET | Refills: 2 | OUTPATIENT
Start: 2024-03-25

## 2024-03-25 RX ORDER — FAMOTIDINE 20 MG/1
20 TABLET, FILM COATED ORAL DAILY PRN
COMMUNITY

## 2024-03-25 RX ORDER — TOLTERODINE 4 MG/1
4 CAPSULE, EXTENDED RELEASE ORAL DAILY PRN
Qty: 30 CAPSULE | Refills: 1 | Status: SHIPPED | OUTPATIENT
Start: 2024-03-25

## 2024-03-25 RX ORDER — CETIRIZINE HYDROCHLORIDE 10 MG/1
10 TABLET ORAL DAILY PRN
COMMUNITY

## 2024-03-25 RX ORDER — AMITRIPTYLINE HYDROCHLORIDE 10 MG/1
10 TABLET, FILM COATED ORAL NIGHTLY
Qty: 90 TABLET | Refills: 3 | Status: SHIPPED | OUTPATIENT
Start: 2024-03-25

## 2024-03-25 NOTE — ASSESSMENT & PLAN NOTE
Persistent bord TFTs; reviewed s/sxs hypothyroidism; no meds; f/u TFTs in 6 mos for serial examination

## 2024-03-25 NOTE — PROGRESS NOTES
ANNUAL WELLNESS VISIT    DRUG AND ALCOHOL USE      no alcohol use, no tobacco use, and caffeine intake: 1 cups of caffeinated coffee per day    DIET AND PHYSICAL ACTIVITY     Diet: general, limited junk food    Exercise: infrequently   Exercise Details: walking, stretching/yoga, weight machines    MOOD DISORDER AND COGNITIVE SCREENING     PHQ-2 Depression Screening - components reviewed with patient; screening is negative for active depression.    Little interest or pleasure in doing things? 0-->not at all   Feeling down, depressed, or hopeless? 0-->not at all   PHQ-2 Total Score 0      Anxiety Screening Tool Used YES     AUDIT screening  0     Mini-Cog Performed   Yes    1. Tell Patient 3 Words Car house boat    2. Administer Clock Test normal    3. Recall 3 words  Car house boat     4. Number Correct Items 3    FUNCTIONAL ABILITY AND LEVEL OF SAFETY   Hearing no hearing loss     Wears Hearing Aids No       Current Activities Independent      none  - see Funct/Cog Status Intake     Fall Risk Assessment       Has difficulty with walking or balance  No         Timed Up and Go (TUG) Test  9 sec.       If >12 sec, normal    ADVANCED DIRECTIVE  Advance Care Planning   ACP discussion was held with the patient during this visit. Patient has an advance directive in EMR which is still valid.   Advance Care Planning Discussion:  Patient has advanced directive and living will.  Reviewed desires for end of life care, which is to have comfort care.  Patient does not want extraordinary life-sustaining measures, including no prolonged artificial life support. Encouraged patient to ensure family is aware of desires/preferences. Confirmed  is her healthcare surrogate.    PAIN SCREENING Do you have pain right now? no      If so, 1-10 scale: 0          Do you have pain every day? No      Probable chronic pain: No     Recent Hospitalizations:  No hospitalization(s) within the last year..     MEDICATION REVIEW   - updated and  reviewed (see Medication List).   - reviewed for potentially harmful drug-disease interactions in the elderly.   - reviewed for high risk medications in the elderly.   - aspirin use: No    BMI  Body mass index is 24 kg/m².  BMI is within normal parameters. No other follow-up for BMI required.       _________________________________________________________    Chief Complaint   Patient presents with    Medicare Wellness-subsequent    Hyperlipidemia    Impaired fasting glucose       History of Present Illness  73 y.o.  female presents for updated phys examination and wellness visit as well as f/u on sugars and cholesterol.    Reports finishing XRT and ongoing cycles of xeloda for breast CA. Reports mammo in 1/24 with plans for 6-month follow-up.    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 Morse every 2 mos, with eye injections Q2 mos. Also has annual with Dr. Nieto upcoming 4/2024.    Reports ER visit for vertigo; resolved with Epley maneuvers done at home.    Brings results of medication genetics testing; no significant interactions with her current meds; PPIs listed as potential interactions, but she is only on prn pepcid and zyrtec with her infusions.    Review of Systems  Denies headaches, visual changes, CP, palpitations, SOB, cough, abd pain, n/v/d, difficulty with urination, numbness/tingling, falls, mood changes, lightheadedness, hearing changes, rashes.    Only uses tolterodine with travel.    All other ROS reviewed and negative.    Current Outpatient Medications:     amitriptyline (ELAVIL) 10 MG QHS    capecitabine (XELODA) 500 MG chemo tablet AD     Cholecalciferol (VITAMIN D) 1000 UNITS QD    meclizine (ANTIVERT) 25 MG prn    multivitamin QD    psyllium (METAMUCIL) 0.52 g QD    Ranibizumab 0.5 MG/0.05ML solution Q2 mos (eye)    tolterodine LA (DETROL LA) 4 MG QD with travel    OPIOID SCREENING:  The patient does not have opioid prescription on her medication list. Medication  "list has been reviewed with the patient regarding potentially high risk medications and harmful drug interactions in patients over age 65.    VITALS:  /76   Pulse 90   Temp 96.5 °F (35.8 °C)   Ht 157.5 cm (62\")   Wt 59.5 kg (131 lb 3.2 oz)   SpO2 98%   BMI 24.00 kg/m²     Physical Exam  Vitals and nursing note reviewed.   Constitutional:       General: She is not in acute distress.     Appearance: Normal appearance. She is well-developed.   HENT:      Head: Normocephalic.      Right Ear: Tympanic membrane, ear canal and external ear normal.      Left Ear: Tympanic membrane, ear canal and external ear normal.      Nose: Nose normal.   Eyes:      Extraocular Movements: Extraocular movements intact.      Conjunctiva/sclera: Conjunctivae normal.      Pupils: Pupils are equal, round, and reactive to light.   Neck:      Vascular: No carotid bruit (bilaterally).   Cardiovascular:      Rate and Rhythm: Normal rate and regular rhythm.      Heart sounds: Normal heart sounds.   Pulmonary:      Effort: Pulmonary effort is normal. No respiratory distress.      Breath sounds: Normal breath sounds. No wheezing or rales.   Abdominal:      General: Bowel sounds are normal. There is no distension.      Palpations: Abdomen is soft. There is no mass.      Tenderness: There is no abdominal tenderness.   Genitourinary:     Comments: Breast exam deferred to oncology and surgery  Musculoskeletal:      Cervical back: Normal range of motion and neck supple.      Right lower leg: No edema.      Left lower leg: No edema.   Lymphadenopathy:      Cervical: No cervical adenopathy.   Skin:     General: Skin is warm and dry.      Findings: No rash.   Neurological:      Mental Status: She is alert and oriented to person, place, and time.      Gait: Gait normal.   Psychiatric:         Mood and Affect: Mood normal.         Behavior: Behavior normal.         LABS  Results for orders placed or performed in visit on 03/18/24   Comprehensive " Metabolic Panel    Specimen: Blood   Result Value Ref Range    Glucose 98 65 - 99 mg/dL    BUN 15 8 - 23 mg/dL    Creatinine 0.57 0.57 - 1.00 mg/dL    Sodium 136 136 - 145 mmol/L    Potassium 3.5 3.5 - 5.2 mmol/L    Chloride 98 98 - 107 mmol/L    CO2 28.3 22.0 - 29.0 mmol/L    Calcium 9.1 8.6 - 10.5 mg/dL    Total Protein 6.7 6.0 - 8.5 g/dL    Albumin 4.2 3.5 - 5.2 g/dL    ALT (SGPT) 13 1 - 33 U/L    AST (SGOT) 18 1 - 32 U/L    Alkaline Phosphatase 85 39 - 117 U/L    Total Bilirubin 0.4 0.0 - 1.2 mg/dL    Globulin 2.5 gm/dL    A/G Ratio 1.7 g/dL    BUN/Creatinine Ratio 26.3 (H) 7.0 - 25.0    Anion Gap 9.7 5.0 - 15.0 mmol/L    eGFR 94.9 >60.0 mL/min/1.73   Lipid Panel    Specimen: Blood   Result Value Ref Range    Total Cholesterol 198 0 - 200 mg/dL    Triglycerides 73 0 - 150 mg/dL    HDL Cholesterol 92 (H) 40 - 60 mg/dL    LDL Cholesterol  93 0 - 100 mg/dL    VLDL Cholesterol 13 5 - 40 mg/dL    LDL/HDL Ratio 0.99    TSH    Specimen: Blood   Result Value Ref Range    TSH 8.410 (H) 0.270 - 4.200 uIU/mL   Hemoglobin A1c    Specimen: Blood   Result Value Ref Range    Hemoglobin A1C 6.20 (H) 4.80 - 5.60 %   T4, Free    Specimen: Blood   Result Value Ref Range    Free T4 1.21 0.93 - 1.70 ng/dL   CBC Auto Differential    Specimen: Blood   Result Value Ref Range    WBC 4.37 3.40 - 10.80 10*3/mm3    RBC 3.59 (L) 3.77 - 5.28 10*6/mm3    Hemoglobin 11.4 (L) 12.0 - 15.9 g/dL    Hematocrit 34.0 34.0 - 46.6 %    MCV 94.7 79.0 - 97.0 fL    MCH 31.8 26.6 - 33.0 pg    MCHC 33.5 31.5 - 35.7 g/dL    RDW 16.1 (H) 12.3 - 15.4 %    RDW-SD 52.9 37.0 - 54.0 fl    MPV 9.1 6.0 - 12.0 fL    Platelets 335 140 - 450 10*3/mm3    Neutrophil % 52.9 42.7 - 76.0 %    Lymphocyte % 28.6 19.6 - 45.3 %    Monocyte % 11.9 5.0 - 12.0 %    Eosinophil % 5.0 0.3 - 6.2 %    Basophil % 1.1 0.0 - 1.5 %    Immature Grans % 0.5 0.0 - 0.5 %    Neutrophils, Absolute 2.31 1.70 - 7.00 10*3/mm3    Lymphocytes, Absolute 1.25 0.70 - 3.10 10*3/mm3    Monocytes, Absolute  0.52 0.10 - 0.90 10*3/mm3    Eosinophils, Absolute 0.22 0.00 - 0.40 10*3/mm3    Basophils, Absolute 0.05 0.00 - 0.20 10*3/mm3    Immature Grans, Absolute 0.02 0.00 - 0.05 10*3/mm3    nRBC 0.0 0.0 - 0.2 /100 WBC   Urinalysis without microscopic (no culture) - Urine, Clean Catch    Specimen: Urine, Clean Catch   Result Value Ref Range    Color, UA Yellow Yellow, Straw    Appearance, UA Clear Clear    pH, UA 7.0 5.0 - 8.0    Specific Gravity, UA 1.018 1.005 - 1.030    Glucose, UA Negative Negative    Ketones, UA Negative Negative    Bilirubin, UA Negative Negative    Blood, UA Negative Negative    Protein, UA Negative Negative    Leuk Esterase, UA Negative Negative    Nitrite, UA Negative Negative    Urobilinogen, UA 0.2 E.U./dL 0.2 - 1.0 E.U./dL   Urinalysis, Microscopic Only - Urine, Clean Catch    Specimen: Urine, Clean Catch   Result Value Ref Range    RBC, UA None Seen None Seen, 0-2 /HPF    WBC, UA 0-2 None Seen, 0-2 /HPF    Bacteria, UA None Seen None Seen /HPF    Squamous Epithelial Cells, UA None Seen None Seen, 0-2 /HPF    Hyaline Casts, UA None Seen None Seen /LPF    Methodology Manual Light Microscopy      3/2/24 EKG- NSR    3/2/24 CT head - normal  3/2/24 pCXR - chronic interstitial changes, degen changes    3/23/     ASSESSMENT/PLAN    Diagnoses and all orders for this visit:    1. Medicare annual wellness visit, subsequent (Primary)  Assessment & Plan:  Health maintenance - flu vacc, COVID19 vacc and RSV vacc all completed for this season; COVID19 booster given today since considered immunocompromised with ongoing CA treatment; Prevnar 2/16, PVX 2/14, Tdap 5/22 (next Td due 2032), HAV and Shingrix done; mammo [facility] 10/23 with R bx; no further Paps; DEXA 4/23, repeat 2025-26, colonosc 4/16, repeat 2026 per Dr. Knight, NEG cologuard 11/8/21; neg AAA 2/14; eye exam w/ Dr. Nieto pending 4/24 but sees Dr. Morse Q2 mos for R eye mac degeneration; dental exam q6 mos; (+) seat belt  use    Consultants:  Patient Care Team:  Mirna Stack MD as PCP - General  Nika Gibbs MD as Consulting Physician (Gastroenterology)  Oliver Resendiz MD as Consulting Physician (Medical Oncology)  Delmis Parker MD as Consulting Physician (Dermatology)  Jono Dawson MD (Surgical Oncology)  Holden Knight MD as Consulting Physician (Gastroenterology)  Daniel Rosen DPM (Podiatry)  Brandon John MD as Consulting Physician (Allergy)  Jake Cornelius MD as Consulting Physician (Otolaryngology)  Colt Nieto OD (Optometry)  David Davis MD as Consulting Physician (Ophthalmology)  Nettie Ribeiro MD as Consulting Physician (Rheumatology)  Bri Llamas DDS (Dental General Practice)  Quintin Borden MD as Consulting Physician (Rheumatology)  Bony Valdez APRN as Nurse Practitioner (Obstetrics and Gynecology)  Edwardo Diana MD as Consulting Physician (Orthopedic Surgery)  Farida Singh MD (Surgical Oncology)  Dominique Lowery MD as Consulting Physician (Hematology and Oncology)        2. Hyperlipidemia  Assessment & Plan:   Lipids stable, improved with LDL 93; no meds      3. Impaired fasting glucose  Assessment & Plan:  BG control stable with A1C 6.2; encouraged reg phys activity to decr insulin resistance, moderation in unhealthy starches/sweets; f/u A1C in 6 mos      Orders:  -     Hemoglobin A1c; Future    4. Abnormal finding on thyroid function test  Assessment & Plan:  Persistent bord TFTs; reviewed s/sxs hypothyroidism; no meds; f/u TFTs in 6 mos for serial examination    Orders:  -     TSH; Future  -     T4, Free; Future    5. Osteopenia  Assessment & Plan:  Calcium and vitamin D supplementation with weight-bearing exercise; DEXA 4/23, repeat 2026        6. Left lateral abdominal pain  Assessment & Plan:  Stable/resolved on amitriptyline 10mg QHS #90, 3RF    Orders:  -     amitriptyline (ELAVIL) 10 MG tablet;  Take 1 tablet by mouth Every Night.  Dispense: 90 tablet; Refill: 3    7. Anemia associated with chemotherapy  Assessment & Plan:  Persistent bord anemia, unchanged with H&H 11.4/34.0; f/u CBC in 6 mos    Orders:  -     CBC & Differential; Future    8. Benign paroxysmal positional vertigo of left ear  Assessment & Plan:  Currently resolved; reviewed potential etiology and logic for Epley maneuver; follow clinically      9. Urge incontinence of urine  -     tolterodine LA (DETROL LA) 4 MG 24 hr capsule; Take 1 capsule by mouth Daily As Needed (over active bladder, only takes when traveling).  Dispense: 30 capsule; Refill: 1    10. Encounter for medication counseling  Comments:  reviewed medication genetics testing; no current interactions        FOLLOW-UP  RTC 6 mos with A1C, TFTs, CBC (Escribed)    Electronically signed by:    Mirna Stack MD, FACP  03/25/2024

## 2024-03-25 NOTE — ASSESSMENT & PLAN NOTE
Health maintenance - flu vacc, COVID19 vacc and RSV vacc all completed for this season; COVID19 booster given today since considered immunocompromised with ongoing CA treatment; Prevnar 2/16, PVX 2/14, Tdap 5/22 (next Td due 2032), HAV and Shingrix done; mammo [facility] 10/23 with R bx; no further Paps; DEXA 4/23, repeat 2025-26, colonosc 4/16, repeat 2026 per Dr. Knight, NEG cologuard 11/8/21; neg AAA 2/14; eye exam w/ Dr. Nieto pending 4/24 but sees Dr. Morse Q2 mos for R eye mac degeneration; dental exam q6 mos; (+) seat belt use    Consultants:  Patient Care Team:  Mirna Stack MD as PCP - General  Nika Gibbs MD as Consulting Physician (Gastroenterology)  Oliver Resendiz MD as Consulting Physician (Medical Oncology)  Delmis Parker MD as Consulting Physician (Dermatology)  Jono Dawson MD (Surgical Oncology)  Holden Knight MD as Consulting Physician (Gastroenterology)  Daniel Rosen, DPM (Podiatry)  Brandon John MD as Consulting Physician (Allergy)  Jake Cornelius MD as Consulting Physician (Otolaryngology)  Colt Nieto, LEFTY (Optometry)  David Davis MD as Consulting Physician (Ophthalmology)  Nettie Ribeiro MD as Consulting Physician (Rheumatology)  Bri Llamas DDS (Dental General Practice)  Quintin Borden MD as Consulting Physician (Rheumatology)  Bony Valdez APRN as Nurse Practitioner (Obstetrics and Gynecology)  Edwardo Diana MD as Consulting Physician (Orthopedic Surgery)  Farida Singh MD (Surgical Oncology)  Dominique Lowery MD as Consulting Physician (Hematology and Oncology)

## 2024-03-25 NOTE — PROGRESS NOTES
Immunization  Immunization performed in right deltoid by Octavia Garzon MA. Patient tolerated the procedure well without complications.  03/25/24   Octavia Garzon MA

## 2024-06-24 ENCOUNTER — OFFICE VISIT (OUTPATIENT)
Dept: INTERNAL MEDICINE | Facility: CLINIC | Age: 74
End: 2024-06-24
Payer: MEDICARE

## 2024-06-24 VITALS
WEIGHT: 131.8 LBS | RESPIRATION RATE: 20 BRPM | DIASTOLIC BLOOD PRESSURE: 60 MMHG | TEMPERATURE: 97.7 F | HEART RATE: 94 BPM | SYSTOLIC BLOOD PRESSURE: 110 MMHG | OXYGEN SATURATION: 98 % | HEIGHT: 62 IN | BODY MASS INDEX: 24.25 KG/M2

## 2024-06-24 DIAGNOSIS — J01.00 ACUTE NON-RECURRENT MAXILLARY SINUSITIS: ICD-10-CM

## 2024-06-24 DIAGNOSIS — Z17.1 MALIGNANT NEOPLASM OF UPPER-OUTER QUADRANT OF LEFT BREAST IN FEMALE, ESTROGEN RECEPTOR NEGATIVE: Chronic | ICD-10-CM

## 2024-06-24 DIAGNOSIS — J06.9 URI WITH COUGH AND CONGESTION: Primary | ICD-10-CM

## 2024-06-24 DIAGNOSIS — C50.412 MALIGNANT NEOPLASM OF UPPER-OUTER QUADRANT OF LEFT BREAST IN FEMALE, ESTROGEN RECEPTOR NEGATIVE: Chronic | ICD-10-CM

## 2024-06-24 LAB
EXPIRATION DATE: NORMAL
FLUAV AG UPPER RESP QL IA.RAPID: NOT DETECTED
FLUBV AG UPPER RESP QL IA.RAPID: NOT DETECTED
INTERNAL CONTROL: NORMAL
Lab: NORMAL
SARS-COV-2 AG UPPER RESP QL IA.RAPID: NOT DETECTED

## 2024-06-24 PROCEDURE — 1160F RVW MEDS BY RX/DR IN RCRD: CPT | Performed by: NURSE PRACTITIONER

## 2024-06-24 PROCEDURE — 99213 OFFICE O/P EST LOW 20 MIN: CPT | Performed by: NURSE PRACTITIONER

## 2024-06-24 PROCEDURE — 1125F AMNT PAIN NOTED PAIN PRSNT: CPT | Performed by: NURSE PRACTITIONER

## 2024-06-24 PROCEDURE — 87428 SARSCOV & INF VIR A&B AG IA: CPT | Performed by: NURSE PRACTITIONER

## 2024-06-24 PROCEDURE — 1159F MED LIST DOCD IN RCRD: CPT | Performed by: NURSE PRACTITIONER

## 2024-06-24 RX ORDER — AMOXICILLIN 875 MG/1
875 TABLET, COATED ORAL 2 TIMES DAILY
Qty: 10 TABLET | Refills: 0 | Status: SHIPPED | OUTPATIENT
Start: 2024-06-24

## 2024-06-24 NOTE — PROGRESS NOTES
Office Note     Name: Natalia Estrada    : 1950     MRN: [id number]     Chief Complaint  Fever (Started 24, Started last round of chemo on 24), Diarrhea, Nasal Congestion, Chills, Generalized Body Aches, Fatigue (/), Headache, and Earache    Subjective     History of Present Illness:  Natalia Estrada is a 73 y.o. female who presents today for acute concerns    Patient regularly follows with Dr. Stack    Patient is here today with symptoms that started late last week/into the weekend.  She has had chills and a recorded fever.  Fevers been as high as 102.5.  She did take Tylenol which did help.  Patient does have comorbidity of breast cancer and is currently on chemotherapy.  She is also had an earache and a headache as well as some dental pain on the left side.  She states this is her telltale sign of a sinus infection.  At home COVID test were negative.  No sick contacts.  She has not yet called and notified oncology about her fever.  She does not get vaginal yeast infections with antibiotics        Past Medical History:   Diagnosis Date    Abdominal pain, left lower quadrant 2016    Impression: 3/2/17 normal abd CT (except mild interstitial scarring lung bases); 2014 - ddx includes colon spasm, diverticulitis, less likely reflux, gastritis, pancreatitis, GYN etiology (h/o YULIYA/BSO but note (+)FH ovarian CA); with exam findings and hx, proceed with trial of SL levsin; if no better, plan to check labs and CT for further evaluation; note last colonoscopy done  (to be re    Hx of AAA ultrasound 2014    neg AAA ultrasound (14)    Hx of bone density study 2014    DEXA (3/7/14): L -1.4, H -1.6    Hx of bone density study 2017    DEXA (3/16/17) L -1.7, H -1.3, repeat 3 yrs    Hx of bone density study 2020    DEXA (20): L -1.6, H -1.3, repeat 3 yrs    Hx of bone density study 2023    DEXA (4/3/23) L -1.8, H -1.3; repeat 3 yrs    Hx of chest x-ray  12/09/2021    nl CXR (12/9/21) except for degen changes and scoliosis    Hx of colonoscopy 04/18/2016    colonosc (4/18/16): hyperplastic polyp, diverticulosis, repeat 10 yrs; GI - Dr. Knight    Hx of CT scan of head 03/02/2024    nl CT head (3/2/24)    Hx of echocardiogram 03/16/2014    ECHO (2/17): mild concentric LVH and diastolic dysfunction, mild MR/TR/AR    Hx of echocardiogram 04/25/2023    ECHO (4/25/23, ): EF 66%, mild MR/AR, tr TR/TX, bord RVSP, thickened MV chordae    Hx of pelvic ultrasound 02/05/2020    nl pelvic u/s (2/5/20); ovaries absent    Hx of thyroid ultrasound 01/30/2018    nl thyr u/s (1/30/18)       Past Surgical History:   Procedure Laterality Date    BILATERAL OOPHORECTOMY  02/2003    due to (+)FH ovarian CA; GYN - Dr. Resendiz    CATARACT EXTRACTION Bilateral 2021    CYST REMOVAL      toe, right middle     INCISIONAL BREAST BIOPSY      x2 (5/71 and 12/81)surg - Dr. Dawson    TOTAL HIP ARTHROPLASTY Right 08/02/2021    Procedure: TOTAL HIP ARTHROPLASTY RIGHT;  Surgeon: Edwardo Diana MD;  Location: [facility], [facility];  Service: Orthopedics;  Laterality: Right;    TUBAL ABDOMINAL LIGATION  1984       Social History     Socioeconomic History    Marital status:    Tobacco Use    Smoking status: Never     Passive exposure: Never    Smokeless tobacco: Never   Vaping Use    Vaping status: Never Used   Substance and Sexual Activity    Alcohol use: Never    Drug use: Never    Sexual activity: Yes     Partners: Male     Birth control/protection: Post-menopausal, Tubal ligation     Comment: spouse         Current Outpatient Medications:     amitriptyline (ELAVIL) 10 MG tablet, Take 1 tablet by mouth Every Night., Disp: 90 tablet, Rfl: 3    capecitabine (XELODA) 500 MG chemo tablet, Take 3 tabs (1500 mg) twice daily Daily x 14 days, then 7 days off, Disp: , Rfl:     cetirizine (zyrTEC) 10 MG tablet, Take 1 tablet by mouth Daily As Needed for Allergies. With infusions, Disp: , Rfl:      "Cholecalciferol (VITAMIN D) 1000 UNITS tablet, Take 1 tablet by mouth Daily., Disp: , Rfl:     famotidine (PEPCID) 20 MG tablet, Take 1 tablet by mouth Daily As Needed for Heartburn. With infusions, Disp: , Rfl:     meclizine (ANTIVERT) 25 MG tablet, Take 1 tablet by mouth 3 (Three) Times a Day As Needed for Dizziness., Disp: 15 tablet, Rfl: 0    multivitamin with minerals tablet tablet, Take 1 tablet by mouth Daily., Disp: , Rfl:     psyllium (METAMUCIL) 0.52 g capsule, Take 1 capsule by mouth Daily As Needed for Constipation., Disp: , Rfl:     Ranibizumab 0.5 MG/0.05ML solution, 0.5 mg by Intravitreal route Every 2 (Two) Months., Disp: , Rfl:     tolterodine LA (DETROL LA) 4 MG 24 hr capsule, Take 1 capsule by mouth Daily As Needed (over active bladder, only takes when traveling)., Disp: 30 capsule, Rfl: 1    amoxicillin (AMOXIL) 875 MG tablet, Take 1 tablet by mouth 2 (Two) Times a Day., Disp: 10 tablet, Rfl: 0    Objective     Vital Signs  /60   Pulse 94   Temp 97.7 °F (36.5 °C) (Temporal)   Resp 20   Ht 157.5 cm (62.01\")   Wt 59.8 kg (131 lb 12.8 oz)   SpO2 98%   BMI 24.10 kg/m²   Estimated body mass index is 24.1 kg/m² as calculated from the following:    Height as of this encounter: 157.5 cm (62.01\").    Weight as of this encounter: 59.8 kg (131 lb 12.8 oz).    BMI is within normal parameters. No other follow-up for BMI required.           Physical Exam  Vitals and nursing note reviewed.   Constitutional:       Appearance: Normal appearance.   HENT:      Head: Normocephalic and atraumatic.      Right Ear: Hearing, ear canal and external ear normal. A middle ear effusion is present.      Left Ear: Hearing, ear canal and external ear normal. A middle ear effusion is present.      Nose: Congestion present.      Right Sinus: No maxillary sinus tenderness or frontal sinus tenderness.      Left Sinus: Maxillary sinus tenderness present. No frontal sinus tenderness.      Mouth/Throat:      Lips: Pink. "      Mouth: Mucous membranes are moist.   Eyes:      Extraocular Movements: Extraocular movements intact.      Pupils: Pupils are equal, round, and reactive to light.   Cardiovascular:      Rate and Rhythm: Normal rate and regular rhythm.      Heart sounds: Normal heart sounds.   Pulmonary:      Effort: Pulmonary effort is normal.      Breath sounds: Normal breath sounds.   Musculoskeletal:         General: Normal range of motion.   Lymphadenopathy:      Cervical: No cervical adenopathy.   Skin:     General: Skin is warm and dry.   Neurological:      Mental Status: She is alert and oriented to person, place, and time.   Psychiatric:         Mood and Affect: Mood normal.         Behavior: Behavior normal.          Lab Review:   Latest Reference Range & Units 06/24/24 09:16   SARS Antigen Not Detected, Presumptive Negative  Not Detected   Expiration Date  02-15-25   Lot Number  [id number]   Influenza A Antigen FINN Not Detected  Not Detected   Influenza B Antigen FINN Not Detected  Not Detected            Assessment and Plan     Diagnoses and all orders for this visit:    1. URI with cough and congestion (Primary)  -     POCT SARS-CoV-2 Antigen FINN + Flu    2. Acute non-recurrent maxillary sinusitis  -     amoxicillin (AMOXIL) 875 MG tablet; Take 1 tablet by mouth 2 (Two) Times a Day.  Dispense: 10 tablet; Refill: 0    3. Malignant neoplasm of upper-outer quadrant of left breast in female, estrogen receptor negative    Plan  Discussed results of in office testing with patient today  Patient will be placed on antibiotic therapy.  I do feel that this would be prudent at this point as she has had a fever and is currently on chemotherapy.  She can continue with allergy medication and nasal washes.  She does not get vaginal yeast infections with antibiotics.  Will hold on sending in Diflucan  I did stress to patient that she does need to call her oncologist and let them know that she has run a fever and that she was seen  today by our office  Go to ER if any condition worsens or severe  Plan to follow-up with Dr. Stack as scheduled    Follow Up  Return for Next scheduled follow up.    JOSE Victoria    Part of this note may be an electronic transcription/translation of spoken language to printed text using the Dragon Dictation System.

## 2024-07-31 PROBLEM — M70.70 BURSITIS OF HIP: Status: ACTIVE | Noted: 2024-07-31

## 2024-07-31 PROBLEM — M19.90 OSTEOARTHRITIS: Status: ACTIVE | Noted: 2024-07-31

## 2024-08-05 ENCOUNTER — OFFICE VISIT (OUTPATIENT)
Age: 74
End: 2024-08-05
Payer: MEDICARE

## 2024-08-05 VITALS
WEIGHT: 130.3 LBS | HEIGHT: 62 IN | HEART RATE: 80 BPM | BODY MASS INDEX: 23.98 KG/M2 | TEMPERATURE: 97.6 F | SYSTOLIC BLOOD PRESSURE: 130 MMHG | DIASTOLIC BLOOD PRESSURE: 60 MMHG

## 2024-08-05 DIAGNOSIS — G89.29 ENCOUNTER FOR CHRONIC PAIN MANAGEMENT: ICD-10-CM

## 2024-08-05 DIAGNOSIS — Z85.3 HISTORY OF BREAST CANCER: ICD-10-CM

## 2024-08-05 DIAGNOSIS — M15.9 GENERALIZED OSTEOARTHROSIS, INVOLVING MULTIPLE SITES: Primary | ICD-10-CM

## 2024-08-05 DIAGNOSIS — Z96.641 HISTORY OF RIGHT HIP REPLACEMENT: ICD-10-CM

## 2024-08-05 PROBLEM — Z79.1 NSAID LONG-TERM USE: Status: ACTIVE | Noted: 2017-02-23

## 2024-08-05 PROCEDURE — 1159F MED LIST DOCD IN RCRD: CPT | Performed by: INTERNAL MEDICINE

## 2024-08-05 PROCEDURE — 1160F RVW MEDS BY RX/DR IN RCRD: CPT | Performed by: INTERNAL MEDICINE

## 2024-08-05 PROCEDURE — 99213 OFFICE O/P EST LOW 20 MIN: CPT | Performed by: INTERNAL MEDICINE

## 2024-08-05 PROCEDURE — G2211 COMPLEX E/M VISIT ADD ON: HCPCS | Performed by: INTERNAL MEDICINE

## 2024-08-05 RX ORDER — NABUMETONE 750 MG/1
750 TABLET, FILM COATED ORAL 2 TIMES DAILY
COMMUNITY
End: 2024-08-05

## 2024-08-05 NOTE — PROGRESS NOTES
Office Follow Up      Date: 08/05/2024   Patient Name: Natalia Estrada  MRN: [id number]  YOB: 1950    Referring Physician: No ref. provider found     Chief Complaint:   Chief Complaint   Patient presents with    Osteoarthritis       History of Present Illness: Natalia Estrada is a 73 y.o. female who is here today for follow up on OA    Former patient of Dr. Dodson.  She follows for chronic osteoarthritis primarily low back, hips and knees.     Pain is primarily in the hips and knees with activity.  Better with rest.    She had right hip replacement with Dr. Diana in 2021.  This has been very successful.    Still with moderate OA left hip which bothers her from time to time.      Uses Tylenol as needed for joint pain which seems to be adequate  No longer taking nabumetone      Subjective       Review of Systems: Review of Systems   Constitutional:  Positive for unexpected weight loss. Negative for chills, fatigue and fever.   HENT:  Negative for mouth sores, sinus pressure and sore throat.         Dry mouth  Nose sores  Dry eyes   Eyes:  Negative for pain and redness.        Dry eyes   Respiratory:  Negative for cough and shortness of breath.    Cardiovascular:  Negative for chest pain.   Gastrointestinal:  Negative for abdominal pain, blood in stool, diarrhea, nausea, vomiting and GERD.   Endocrine: Negative for polydipsia and polyuria.        Hot flashes  Hair loss   Genitourinary:  Negative for dysuria, genital sores and hematuria.   Musculoskeletal:  Positive for arthralgias. Negative for back pain, joint swelling, myalgias, neck pain and neck stiffness.   Skin:  Negative for rash and bruise.        Psoriasis  Photosensitvity  Malar rash   Allergic/Immunologic: Negative for immunocompromised state.   Neurological:  Negative for seizures, weakness, numbness and memory problem.   Hematological:  Negative for adenopathy. Does not bruise/bleed easily.   Psychiatric/Behavioral:   "Negative for depressed mood. The patient is not nervous/anxious.         Medications:   Current Outpatient Medications:     acetaminophen (TYLENOL) 325 MG tablet, Take 2 tablets by mouth Every 6 (Six) Hours As Needed., Disp: , Rfl:     amitriptyline (ELAVIL) 10 MG tablet, Take 1 tablet by mouth Every Night., Disp: 90 tablet, Rfl: 3    cetirizine (zyrTEC) 10 MG tablet, Take 1 tablet by mouth Daily As Needed for Allergies. With infusions, Disp: , Rfl:     dexAMETHasone (DECADRON) 4 MG tablet, Take  by mouth., Disp: , Rfl:     famotidine (PEPCID) 20 MG tablet, Take 1 tablet by mouth Daily As Needed for Heartburn. With infusions, Disp: , Rfl:     multivitamin with minerals tablet tablet, Take 1 tablet by mouth Daily., Disp: , Rfl:     prochlorperazine (COMPAZINE) 10 MG tablet, Take  by mouth., Disp: , Rfl:     psyllium (METAMUCIL) 0.52 g capsule, Take 1 capsule by mouth Daily As Needed for Constipation., Disp: , Rfl:     tolterodine LA (DETROL LA) 4 MG 24 hr capsule, Take 1 capsule by mouth Daily As Needed (over active bladder, only takes when traveling)., Disp: 30 capsule, Rfl: 1    Allergies:   Allergies   Allergen Reactions    Erythromycin Other (See Comments)     Chest pain    Sulfa Antibiotics Rash       I have reviewed and updated the patient's chief complaint, history of present illness, review of systems, past medical history, surgical history, family history, social history, medications and allergy list as appropriate.     Objective        Vital Signs:   Vitals:    08/05/24 0855   BP: 130/60   BP Location: Left arm   Patient Position: Sitting   Cuff Size: Adult   Pulse: 80   Temp: 97.6 °F (36.4 °C)   Weight: 59.1 kg (130 lb 4.8 oz)   Height: 157.5 cm (62\")   PainSc:   2   PainLoc: Generalized     Body mass index is 23.83 kg/m².      Physical Exam:  Physical Exam   MUSCULOSKELETAL:   No peripheral synovitis  Mild to moderate decreased range of motion left hip  Crepitus with range of motion knees.  No warmth or " "effusion  no peripheral synovitis    Complete joint exam was performed including the MCPs, PIPs, DIPs of the hands, wrists, elbows, shoulders, hips, knees and ankles.  No soft tissue swelling or tenderness is present except as above.    General: The patient is well-developed and well nourished. Cooperative, alert and oriented. Affect is normal. Hydration appears normal.   HEENT: Normocephalic and atraumatic. No notable alopecia. Lids and conjunctiva are normal. Pupils are equal and sclera are clear. Oropharynx is clear   NECK neck is supple without adenopathy, masses or thyromegaly.   CARDIOVASCULAR: Regular rate and rhythm. No murmurs, rubs or gallops   LUNGS: Effort is normal. Lungs are clear bilateral   ABDOMEN: Not examined  EXTREMITIES: Peripheral pulses are intact. No clubbing.   SKIN: No rashes. No subcutaneous nodules. No digital ulcers. No sclerodactyly.   NEUROLOGIC: Gait is normal. Strength testing is normal.  No focal neurologic deficits    Results Review:   Labs:   Lab Results   Component Value Date    GLUCOSE 98 03/18/2024    BUN 15 03/18/2024    CREATININE 0.57 03/18/2024    EGFR 94.9 03/18/2024    BCR 26.3 (H) 03/18/2024    K 3.5 03/18/2024    CO2 28.3 03/18/2024    CALCIUM 9.1 03/18/2024    ALBUMIN 4.2 03/18/2024    BILITOT 0.4 03/18/2024    AST 18 03/18/2024    ALT 13 03/18/2024     Lab Results   Component Value Date    WBC 5.69 06/18/2024    HGB 11.2 06/18/2024    HCT 33.8 (L) 06/18/2024     (H) 06/18/2024     06/18/2024     Lab Results   Component Value Date    SEDRATE 15 12/09/2021     Lab Results   Component Value Date    CRP <0.30 12/09/2021     No results found for: \"QUANTIFERO\", \"QUANTITB1\", \"QUANTITB2\", \"QUANTIFERN\", \"QUANTIFERM\", \"QUANTITBGLDP\"  No results found for: \"RF\"  Lab Results   Component Value Date    HEPCVIRUSABY Non-Reactive 02/14/2017         Procedures    Assessment / Plan        Assessment & Plan  Generalized osteoarthrosis, involving multiple sites  Primarily " of the left hip, lumbar spine , knees, at times feet.  Right hip replaced 2021 with Dr. Diana  X-ray right hip 5/6/2021:  Advanced OA right hip, moderate OA left hip, degenerative changes lumbar spine, osteopenia bones  former Dr Dodson patient  **Current: Acetaminophen   Prior nabumetone.    Much improved since right hip replacement with Dr. Diana 2021  Still with moderate OA left hip on imaging with arthralgias  -She currently feels acetaminophen adequate for her arthralgias.  -No longer taking nabumetone.  She will let me know if she feels she needs to get back on NSAID therapy  Follow-up 12 months.  History of right hip replacement  2021 with Dr. Diana  Encounter for chronic pain management    History of breast cancer  Dr Dominique Lowery   Dx April 2023 s/p chemo and radiation, s/p zelota              Follow Up:   Return in about 1 year (around 8/5/2025).        Quintin Borden MD  [facility]

## 2024-08-05 NOTE — PATIENT INSTRUCTIONS
"Osteoarthritis    Osteoarthritis is a type of arthritis. It refers to joint pain or joint disease. Osteoarthritis affects tissue that covers the ends of bones in joints (cartilage). Cartilage acts as a cushion between the bones and helps them move smoothly. Osteoarthritis occurs when cartilage in the joints gets worn down. Osteoarthritis is sometimes called \"wear and tear\" arthritis.  Osteoarthritis is the most common form of arthritis. It often occurs in older people. It is a condition that gets worse over time. The joints most often affected by this condition are in the fingers, toes, hips, knees, and spine, including the neck and lower back.    What are the causes?  This condition is caused by the wearing down of cartilage that covers the ends of bones.    What increases the risk?  The following factors may make you more likely to develop this condition:  Being age 50 or older.  Obesity.  Overuse of joints.  Past injury of a joint.  Past surgery on a joint.  Family history of osteoarthritis.    What are the signs or symptoms?  The main symptoms of this condition are pain, swelling, and stiffness in the joint. Other symptoms may include:  An enlarged joint.  More pain and further damage caused by small pieces of bone or cartilage that break off and float inside of the joint.  Small deposits of bone (osteophytes) that grow on the edges of the joint.  A grating or scraping feeling inside the joint when you move it.  Popping or creaking sounds when you move.  Difficulty walking or exercising.  An inability to  items, twist your hand, or control the movements of your hands and fingers.    How is this diagnosed?  This condition may be diagnosed based on:  Your medical history.  A physical exam.  Your symptoms.  X-rays of the affected joints.  Blood tests to rule out other types of arthritis.    How is this treated?  There is no cure for this condition, but treatment can help control pain and improve joint function. " Treatment may include a combination of therapies, such as:  Pain relief techniques, such as:  Applying heat and cold to the joint.  Massage.  A form of talk therapy called cognitive behavioral therapy (CBT). This therapy helps you set goals and follow up on the changes that you make.  Medicines for pain and inflammation. The medicines can be taken by mouth or applied to the skin. They include:  NSAIDs, such as ibuprofen.  Prescription medicines.  Strong anti-inflammatory medicines (corticosteroids).  Certain nutritional supplements.  A prescribed exercise program. You may work with a physical therapist.  Assistive devices, such as a brace, wrap, splint, specialized glove, or cane.  A weight control plan.  Surgery, such as:  An osteotomy. This is done to reposition the bones and relieve pain or to remove loose pieces of bone and cartilage.  Joint replacement surgery. You may need this surgery if you have advanced osteoarthritis.    Follow these instructions at home:    Activity  Rest your affected joints as told by your health care provider.  Exercise as told by your provider. The provider may recommend specific types of exercise, such as:  Strengthening exercises. These are done to strengthen the muscles that support joints affected by arthritis.  Aerobic activities. These are exercises, such as brisk walking or water aerobics, that increase your heart rate.  Range-of-motion activities. These help your joints move more easily.  Balance and agility exercises.    Managing pain, stiffness, and swelling         If told, apply heat to the affected area as often as told by your provider. Use the heat source that your provider recommends, such as a moist heat pack or a heating pad.  If you have a removable assistive device, remove it as told by your provider.  Place a towel between your skin and the heat source. If your provider tells you to keep the assistive device on while you apply heat, place a towel between the  assistive device and the heat source.  Leave the heat on for 20-30 minutes.  If told, put ice on the affected area.  If you have a removable assistive device, remove it as told by your provider.  Put ice in a plastic bag.  Place a towel between your skin and the bag. If your provider tells you to keep the assistive device on during icing, place a towel between the assistive device and the bag.  Leave the ice on for 20 minutes, 2-3 times a day.  If your skin turns bright red, remove the ice or heat right away to prevent skin damage. The risk of damage is higher if you cannot feel pain, heat, or cold.  Move your fingers or toes often to reduce stiffness and swelling.  Raise (elevate) the affected area above the level of your heart while you are sitting or lying down.    General instructions  Take over-the-counter and prescription medicines only as told by your provider.  Maintain a healthy weight. Follow instructions from your provider for weight control.  Do not use any products that contain nicotine or tobacco. These products include cigarettes, chewing tobacco, and vaping devices, such as e-cigarettes. If you need help quitting, ask your provider.  Use assistive devices as told by your provider.    Where to find more information  National [place] of Arthritis and Musculoskeletal and Skin Diseases: niams.nih.gov  National [place] on Aging: trice.nih.gov  American College of Rheumatology: rheumatology.org    Contact a health care provider if:  You have redness, swelling, or a feeling of warmth in a joint that gets worse.  You have a fever along with joint or muscle aches.  You develop a rash.  You have trouble doing your normal activities.  You have pain that gets worse and is not relieved by pain medicine.    This information is not intended to replace advice given to you by your health care provider. Make sure you discuss any questions you have with your health care provider.  Document Revised: 08/17/2023  Document Reviewed: 08/17/2023  Elsevier Patient Education © 2024 [employer] Inc.

## 2024-08-05 NOTE — ASSESSMENT & PLAN NOTE
Primarily of the left hip, lumbar spine , knees, at times feet.  Right hip replaced 2021 with Dr. Diana  X-ray right hip 5/6/2021:  Advanced OA right hip, moderate OA left hip, degenerative changes lumbar spine, osteopenia bones  former Dr Dodson patient  **Current: Acetaminophen   Prior nabumetone.    Much improved since right hip replacement with Dr. Diana 2021  Still with moderate OA left hip on imaging with arthralgias  -She currently feels acetaminophen adequate for her arthralgias.  -No longer taking nabumetone.  She will let me know if she feels she needs to get back on NSAID therapy  Follow-up 12 months.

## 2024-09-16 ENCOUNTER — LAB (OUTPATIENT)
Dept: LAB | Facility: HOSPITAL | Age: 74
End: 2024-09-16
Payer: MEDICARE

## 2024-09-16 DIAGNOSIS — D64.81 ANEMIA ASSOCIATED WITH CHEMOTHERAPY: ICD-10-CM

## 2024-09-16 DIAGNOSIS — T45.1X5A ANEMIA ASSOCIATED WITH CHEMOTHERAPY: ICD-10-CM

## 2024-09-16 DIAGNOSIS — R94.6 ABNORMAL FINDING ON THYROID FUNCTION TEST: Chronic | ICD-10-CM

## 2024-09-16 DIAGNOSIS — R73.01 IMPAIRED FASTING GLUCOSE: Chronic | ICD-10-CM

## 2024-09-16 LAB
BASOPHILS # BLD AUTO: 0.05 10*3/MM3 (ref 0–0.2)
BASOPHILS NFR BLD AUTO: 1 % (ref 0–1.5)
DEPRECATED RDW RBC AUTO: 43.2 FL (ref 37–54)
EOSINOPHIL # BLD AUTO: 0.4 10*3/MM3 (ref 0–0.4)
EOSINOPHIL NFR BLD AUTO: 8 % (ref 0.3–6.2)
ERYTHROCYTE [DISTWIDTH] IN BLOOD BY AUTOMATED COUNT: 12.3 % (ref 12.3–15.4)
HBA1C MFR BLD: 5.9 % (ref 4.8–5.6)
HCT VFR BLD AUTO: 35.2 % (ref 34–46.6)
HGB BLD-MCNC: 11.8 G/DL (ref 12–15.9)
IMM GRANULOCYTES # BLD AUTO: 0.01 10*3/MM3 (ref 0–0.05)
IMM GRANULOCYTES NFR BLD AUTO: 0.2 % (ref 0–0.5)
LYMPHOCYTES # BLD AUTO: 1.72 10*3/MM3 (ref 0.7–3.1)
LYMPHOCYTES NFR BLD AUTO: 34.5 % (ref 19.6–45.3)
MCH RBC QN AUTO: 32.4 PG (ref 26.6–33)
MCHC RBC AUTO-ENTMCNC: 33.5 G/DL (ref 31.5–35.7)
MCV RBC AUTO: 96.7 FL (ref 79–97)
MONOCYTES # BLD AUTO: 0.55 10*3/MM3 (ref 0.1–0.9)
MONOCYTES NFR BLD AUTO: 11 % (ref 5–12)
NEUTROPHILS NFR BLD AUTO: 2.26 10*3/MM3 (ref 1.7–7)
NEUTROPHILS NFR BLD AUTO: 45.3 % (ref 42.7–76)
NRBC BLD AUTO-RTO: 0 /100 WBC (ref 0–0.2)
PLATELET # BLD AUTO: 293 10*3/MM3 (ref 140–450)
PMV BLD AUTO: 9.7 FL (ref 6–12)
RBC # BLD AUTO: 3.64 10*6/MM3 (ref 3.77–5.28)
T4 FREE SERPL-MCNC: 1.12 NG/DL (ref 0.92–1.68)
TSH SERPL DL<=0.05 MIU/L-ACNC: 7.74 UIU/ML (ref 0.27–4.2)
WBC NRBC COR # BLD AUTO: 4.99 10*3/MM3 (ref 3.4–10.8)

## 2024-09-16 PROCEDURE — 83036 HEMOGLOBIN GLYCOSYLATED A1C: CPT

## 2024-09-16 PROCEDURE — 85025 COMPLETE CBC W/AUTO DIFF WBC: CPT

## 2024-09-16 PROCEDURE — 84439 ASSAY OF FREE THYROXINE: CPT

## 2024-09-16 PROCEDURE — 84443 ASSAY THYROID STIM HORMONE: CPT

## 2024-10-06 PROBLEM — T45.1X5A ANEMIA ASSOCIATED WITH CHEMOTHERAPY: Chronic | Status: ACTIVE | Noted: 2023-09-21

## 2024-10-06 PROBLEM — D64.81 ANEMIA ASSOCIATED WITH CHEMOTHERAPY: Chronic | Status: ACTIVE | Noted: 2023-09-21

## 2024-10-06 PROBLEM — M19.90 OSTEOARTHRITIS: Chronic | Status: ACTIVE | Noted: 2024-07-31

## 2024-10-06 RX ORDER — LEVOTHYROXINE SODIUM 25 UG/1
25 TABLET ORAL
Qty: 30 TABLET | Refills: 2 | Status: CANCELLED | OUTPATIENT
Start: 2024-10-10

## 2024-10-06 NOTE — PROGRESS NOTES
"Chief Complaint   Patient presents with    Impaired Fasting Glucose    Hypothyroidism       History of Present Illness  74 y.o.  female presents for f/u on sugars, thyroid, and anemia. Stopped chemo in 6/24; thinks maybe this helped her anemia.    Review of Systems  Denies CP, SOB, lightheadedness, or falls. All other ROS reviewed and negative.      Current Outpatient Medications:     acetaminophen (TYLENOL) 325 MGprn    amitriptyline (ELAVIL) 10 MG QHS    cetirizine (zyrTEC) 10 MG QD    famotidine (PEPCID) 20 MG QD    multivitamin QD    prochlorperazine (COMPAZINE) 10 MG prn    psyllium (METAMUCIL) 0.52 g QD    tolterodine LA (DETROL LA) 4 MG QD    VITALS:  /72   Pulse 61   Ht 157.5 cm (62\")   Wt 59.9 kg (132 lb)   SpO2 99%   BMI 24.14 kg/m²     Physical Exam  Vitals and nursing note reviewed.   Constitutional:       General: She is not in acute distress.     Appearance: Normal appearance. She is not ill-appearing.   Eyes:      Extraocular Movements: Extraocular movements intact.      Conjunctiva/sclera: Conjunctivae normal.   Pulmonary:      Effort: Pulmonary effort is normal. No respiratory distress.   Neurological:      Mental Status: She is alert. Mental status is at baseline.   Psychiatric:         Mood and Affect: Mood normal.         Behavior: Behavior normal.         LABS  Results for orders placed or performed in visit on 09/16/24   TSH    Specimen: Blood   Result Value Ref Range    TSH 7.740 (H) 0.270 - 4.200 uIU/mL   T4, Free    Specimen: Blood   Result Value Ref Range    Free T4 1.12 0.92 - 1.68 ng/dL   Hemoglobin A1c    Specimen: Blood   Result Value Ref Range    Hemoglobin A1C 5.90 (H) 4.80 - 5.60 %   CBC Auto Differential    Specimen: Blood   Result Value Ref Range    WBC 4.99 3.40 - 10.80 10*3/mm3    RBC 3.64 (L) 3.77 - 5.28 10*6/mm3    Hemoglobin 11.8 (L) 12.0 - 15.9 g/dL    Hematocrit 35.2 34.0 - 46.6 %    MCV 96.7 79.0 - 97.0 fL    MCH 32.4 26.6 - 33.0 pg    MCHC 33.5 31.5 - " 35.7 g/dL    RDW 12.3 12.3 - 15.4 %    RDW-SD 43.2 37.0 - 54.0 fl    MPV 9.7 6.0 - 12.0 fL    Platelets 293 140 - 450 10*3/mm3    Neutrophil % 45.3 42.7 - 76.0 %    Lymphocyte % 34.5 19.6 - 45.3 %    Monocyte % 11.0 5.0 - 12.0 %    Eosinophil % 8.0 (H) 0.3 - 6.2 %    Basophil % 1.0 0.0 - 1.5 %    Immature Grans % 0.2 0.0 - 0.5 %    Neutrophils, Absolute 2.26 1.70 - 7.00 10*3/mm3    Lymphocytes, Absolute 1.72 0.70 - 3.10 10*3/mm3    Monocytes, Absolute 0.55 0.10 - 0.90 10*3/mm3    Eosinophils, Absolute 0.40 0.00 - 0.40 10*3/mm3    Basophils, Absolute 0.05 0.00 - 0.20 10*3/mm3    Immature Grans, Absolute 0.01 0.00 - 0.05 10*3/mm3    nRBC 0.0 0.0 - 0.2 /100 WBC     6/18/24 H&H 11.2/33.8    3/18/24 A1c 6.2, TSH 8.41    ASSESSMENT/PLAN    Diagnoses and all orders for this visit:    1. Impaired fasting glucose (Primary)  Assessment & Plan:  BG control stable with A1C 5.9; encouraged reg phys activity to decr insulin resistance, moderation in unhealthy starches/sweets; f/u A1C in 6 mos        2. Hypothyroidism  Assessment & Plan:  Persistently elev TSH x 4 yrs; reviewed s/sxs hypothyroidism; discussed option to add levothyroxine 25mcg QD - pt declines; will f/u TFTs in 6 mos with next wellness      3. Anemia associated with chemotherapy  Assessment & Plan:  Mildly improved blood counts; finished chemo in 6/24; follow clinically            FOLLOW-UP  Health maintenance - flu vacc 10/24, COVID19 vacc 9/24; RSV vacc completed  RTC for next wellness 4/1/25; fasting labs prior to appt (CBC, CMP, TSH, lipids, UA/micro, A1C, FT4)    Electronically signed by:    Mirna Stack MD, FACP  09/23/2024

## 2024-10-06 NOTE — ASSESSMENT & PLAN NOTE
Persistently elev TSH x 4 yrs; reviewed s/sxs hypothyroidism; discussed option to add levothyroxine 25mcg QD - pt declines; will f/u TFTs in 6 mos with next wellness

## 2024-10-10 ENCOUNTER — OFFICE VISIT (OUTPATIENT)
Dept: INTERNAL MEDICINE | Facility: CLINIC | Age: 74
End: 2024-10-10
Payer: MEDICARE

## 2024-10-10 VITALS
WEIGHT: 132 LBS | OXYGEN SATURATION: 99 % | HEART RATE: 61 BPM | HEIGHT: 62 IN | SYSTOLIC BLOOD PRESSURE: 118 MMHG | BODY MASS INDEX: 24.29 KG/M2 | DIASTOLIC BLOOD PRESSURE: 72 MMHG

## 2024-10-10 DIAGNOSIS — R73.01 IMPAIRED FASTING GLUCOSE: Primary | Chronic | ICD-10-CM

## 2024-10-10 DIAGNOSIS — T45.1X5A ANEMIA ASSOCIATED WITH CHEMOTHERAPY: Chronic | ICD-10-CM

## 2024-10-10 DIAGNOSIS — E03.9 ACQUIRED HYPOTHYROIDISM: Chronic | ICD-10-CM

## 2024-10-10 DIAGNOSIS — D64.81 ANEMIA ASSOCIATED WITH CHEMOTHERAPY: Chronic | ICD-10-CM

## 2024-10-10 PROCEDURE — 1160F RVW MEDS BY RX/DR IN RCRD: CPT | Performed by: INTERNAL MEDICINE

## 2024-10-10 PROCEDURE — 1126F AMNT PAIN NOTED NONE PRSNT: CPT | Performed by: INTERNAL MEDICINE

## 2024-10-10 PROCEDURE — G2211 COMPLEX E/M VISIT ADD ON: HCPCS | Performed by: INTERNAL MEDICINE

## 2024-10-10 PROCEDURE — 1159F MED LIST DOCD IN RCRD: CPT | Performed by: INTERNAL MEDICINE

## 2024-10-10 PROCEDURE — 99214 OFFICE O/P EST MOD 30 MIN: CPT | Performed by: INTERNAL MEDICINE

## 2024-11-25 DIAGNOSIS — N39.41 URGE INCONTINENCE OF URINE: ICD-10-CM

## 2024-11-26 RX ORDER — TOLTERODINE 4 MG/1
4 CAPSULE, EXTENDED RELEASE ORAL DAILY PRN
Qty: 30 CAPSULE | Refills: 0 | Status: SHIPPED | OUTPATIENT
Start: 2024-11-26

## 2025-03-10 DIAGNOSIS — R10.9 LEFT LATERAL ABDOMINAL PAIN: ICD-10-CM

## 2025-03-10 RX ORDER — AMITRIPTYLINE HYDROCHLORIDE 10 MG/1
10 TABLET ORAL NIGHTLY
Qty: 90 TABLET | Refills: 2 | OUTPATIENT
Start: 2025-03-10

## 2025-03-14 DIAGNOSIS — R10.9 LEFT LATERAL ABDOMINAL PAIN: ICD-10-CM

## 2025-03-14 RX ORDER — AMITRIPTYLINE HYDROCHLORIDE 10 MG/1
10 TABLET ORAL NIGHTLY
Qty: 14 TABLET | Refills: 0 | Status: SHIPPED | OUTPATIENT
Start: 2025-03-14

## 2025-03-14 NOTE — TELEPHONE ENCOUNTER
Spoke with patient and she needed a short fill sent to pharmacy. Sent 14 tablets to last until patient is seen.

## 2025-03-14 NOTE — TELEPHONE ENCOUNTER
Caller:Marlin with  [facility] - [place] [id number] - [phone] PH - [phone] FX    Relationship: Pharmacy    Best call back number: [phone]    Requested Prescriptions:   Requested Prescriptions     Pending Prescriptions Disp Refills    amitriptyline (ELAVIL) 10 MG tablet 90 tablet 3     Sig: Take 1 tablet by mouth Every Night.        Pharmacy where request should be sent: [facility] - [place] [id number] - [phone]  - [phone] FX     Last office visit with prescribing clinician: 10/10/2024   Last telemedicine visit with prescribing clinician: Visit date not found   Next office visit with prescribing clinician: 4/1/2025     PHARMACY CALLED STATING THEY HAD REQUESTED A REFILL ON THIS MEDICATION THAT CAME BACK WITH A DENIAL THEY THINK MIGHT HAVE BEEN IN ERROR. THEY ARE REQUESTING THE REFILL BE PROCESSED IF POSSIBLE, AND IF DENIED IS REQUESTING A CALL BACK WITH AN EXPLANATION OF THE REASON.      Machelle Oliver Rep   03/14/25 12:08 EDT

## 2025-03-17 DIAGNOSIS — R73.01 IMPAIRED FASTING GLUCOSE: Chronic | ICD-10-CM

## 2025-03-17 DIAGNOSIS — E03.9 ACQUIRED HYPOTHYROIDISM: Chronic | ICD-10-CM

## 2025-03-17 DIAGNOSIS — Z00.00 MEDICARE ANNUAL WELLNESS VISIT, SUBSEQUENT: Primary | ICD-10-CM

## 2025-03-25 ENCOUNTER — LAB (OUTPATIENT)
Dept: LAB | Facility: HOSPITAL | Age: 75
End: 2025-03-25
Payer: MEDICARE

## 2025-03-25 DIAGNOSIS — Z00.00 MEDICARE ANNUAL WELLNESS VISIT, SUBSEQUENT: ICD-10-CM

## 2025-03-25 DIAGNOSIS — R73.01 IMPAIRED FASTING GLUCOSE: Chronic | ICD-10-CM

## 2025-03-25 DIAGNOSIS — E03.9 ACQUIRED HYPOTHYROIDISM: Chronic | ICD-10-CM

## 2025-03-25 LAB
ALBUMIN SERPL-MCNC: 4.1 G/DL (ref 3.5–5.2)
ALBUMIN/GLOB SERPL: 1.6 G/DL
ALP SERPL-CCNC: 65 U/L (ref 39–117)
ALT SERPL W P-5'-P-CCNC: 14 U/L (ref 1–33)
ANION GAP SERPL CALCULATED.3IONS-SCNC: 11.1 MMOL/L (ref 5–15)
AST SERPL-CCNC: 23 U/L (ref 1–32)
BACTERIA UR QL AUTO: ABNORMAL /HPF
BASOPHILS # BLD AUTO: 0.07 10*3/MM3 (ref 0–0.2)
BASOPHILS NFR BLD AUTO: 1 % (ref 0–1.5)
BILIRUB SERPL-MCNC: 0.4 MG/DL (ref 0–1.2)
BILIRUB UR QL STRIP: NEGATIVE
BUN SERPL-MCNC: 12 MG/DL (ref 8–23)
BUN/CREAT SERPL: 22.2 (ref 7–25)
CALCIUM SPEC-SCNC: 9 MG/DL (ref 8.6–10.5)
CHLORIDE SERPL-SCNC: 95 MMOL/L (ref 98–107)
CHOLEST SERPL-MCNC: 209 MG/DL (ref 0–200)
CLARITY UR: CLEAR
CO2 SERPL-SCNC: 25.9 MMOL/L (ref 22–29)
COLOR UR: YELLOW
CREAT SERPL-MCNC: 0.54 MG/DL (ref 0.57–1)
DEPRECATED RDW RBC AUTO: 45.6 FL (ref 37–54)
EGFRCR SERPLBLD CKD-EPI 2021: 95.6 ML/MIN/1.73
EOSINOPHIL # BLD AUTO: 0.34 10*3/MM3 (ref 0–0.4)
EOSINOPHIL NFR BLD AUTO: 4.9 % (ref 0.3–6.2)
ERYTHROCYTE [DISTWIDTH] IN BLOOD BY AUTOMATED COUNT: 13.1 % (ref 12.3–15.4)
GLOBULIN UR ELPH-MCNC: 2.5 GM/DL
GLUCOSE SERPL-MCNC: 85 MG/DL (ref 65–99)
GLUCOSE UR STRIP-MCNC: NEGATIVE MG/DL
HBA1C MFR BLD: 5.6 % (ref 4.8–5.6)
HCT VFR BLD AUTO: 34.6 % (ref 34–46.6)
HDLC SERPL-MCNC: 77 MG/DL (ref 40–60)
HGB BLD-MCNC: 11.2 G/DL (ref 12–15.9)
HGB UR QL STRIP.AUTO: NEGATIVE
HYALINE CASTS UR QL AUTO: ABNORMAL /LPF
IMM GRANULOCYTES # BLD AUTO: 0.03 10*3/MM3 (ref 0–0.05)
IMM GRANULOCYTES NFR BLD AUTO: 0.4 % (ref 0–0.5)
KETONES UR QL STRIP: NEGATIVE
LDLC SERPL CALC-MCNC: 123 MG/DL (ref 0–100)
LDLC/HDLC SERPL: 1.59 {RATIO}
LEUKOCYTE ESTERASE UR QL STRIP.AUTO: ABNORMAL
LYMPHOCYTES # BLD AUTO: 1.92 10*3/MM3 (ref 0.7–3.1)
LYMPHOCYTES NFR BLD AUTO: 27.6 % (ref 19.6–45.3)
MCH RBC QN AUTO: 31 PG (ref 26.6–33)
MCHC RBC AUTO-ENTMCNC: 32.4 G/DL (ref 31.5–35.7)
MCV RBC AUTO: 95.8 FL (ref 79–97)
MONOCYTES # BLD AUTO: 0.52 10*3/MM3 (ref 0.1–0.9)
MONOCYTES NFR BLD AUTO: 7.5 % (ref 5–12)
NEUTROPHILS NFR BLD AUTO: 4.07 10*3/MM3 (ref 1.7–7)
NEUTROPHILS NFR BLD AUTO: 58.6 % (ref 42.7–76)
NITRITE UR QL STRIP: NEGATIVE
NRBC BLD AUTO-RTO: 0 /100 WBC (ref 0–0.2)
PH UR STRIP.AUTO: 7 [PH] (ref 5–8)
PLATELET # BLD AUTO: 352 10*3/MM3 (ref 140–450)
PMV BLD AUTO: 9.8 FL (ref 6–12)
POTASSIUM SERPL-SCNC: 3.9 MMOL/L (ref 3.5–5.2)
PROT SERPL-MCNC: 6.6 G/DL (ref 6–8.5)
PROT UR QL STRIP: NEGATIVE
RBC # BLD AUTO: 3.61 10*6/MM3 (ref 3.77–5.28)
RBC # UR STRIP: ABNORMAL /HPF
REF LAB TEST METHOD: ABNORMAL
SODIUM SERPL-SCNC: 132 MMOL/L (ref 136–145)
SP GR UR STRIP: 1.01 (ref 1–1.03)
SQUAMOUS #/AREA URNS HPF: ABNORMAL /HPF
T4 FREE SERPL-MCNC: 1.25 NG/DL (ref 0.92–1.68)
TRIGL SERPL-MCNC: 47 MG/DL (ref 0–150)
TSH SERPL DL<=0.05 MIU/L-ACNC: 3.54 UIU/ML (ref 0.27–4.2)
UROBILINOGEN UR QL STRIP: ABNORMAL
VLDLC SERPL-MCNC: 9 MG/DL (ref 5–40)
WBC # UR STRIP: ABNORMAL /HPF
WBC NRBC COR # BLD AUTO: 6.95 10*3/MM3 (ref 3.4–10.8)

## 2025-03-25 PROCEDURE — 80053 COMPREHEN METABOLIC PANEL: CPT

## 2025-03-25 PROCEDURE — 81001 URINALYSIS AUTO W/SCOPE: CPT

## 2025-03-25 PROCEDURE — 85025 COMPLETE CBC W/AUTO DIFF WBC: CPT

## 2025-03-25 PROCEDURE — 84443 ASSAY THYROID STIM HORMONE: CPT

## 2025-03-25 PROCEDURE — 83036 HEMOGLOBIN GLYCOSYLATED A1C: CPT

## 2025-03-25 PROCEDURE — 80061 LIPID PANEL: CPT

## 2025-03-25 PROCEDURE — 84439 ASSAY OF FREE THYROXINE: CPT

## 2025-03-31 PROBLEM — Z00.00 MEDICARE ANNUAL WELLNESS VISIT, SUBSEQUENT: Status: ACTIVE | Noted: 2025-03-31

## 2025-04-01 ENCOUNTER — OFFICE VISIT (OUTPATIENT)
Dept: INTERNAL MEDICINE | Facility: CLINIC | Age: 75
End: 2025-04-01
Payer: MEDICARE

## 2025-04-01 VITALS
HEIGHT: 62 IN | OXYGEN SATURATION: 97 % | HEART RATE: 80 BPM | DIASTOLIC BLOOD PRESSURE: 64 MMHG | SYSTOLIC BLOOD PRESSURE: 132 MMHG | BODY MASS INDEX: 23.45 KG/M2 | WEIGHT: 127.4 LBS

## 2025-04-01 DIAGNOSIS — E78.00 PURE HYPERCHOLESTEROLEMIA: Chronic | ICD-10-CM

## 2025-04-01 DIAGNOSIS — E87.1 HYPONATREMIA: ICD-10-CM

## 2025-04-01 DIAGNOSIS — D64.81 ANEMIA ASSOCIATED WITH CHEMOTHERAPY: Chronic | ICD-10-CM

## 2025-04-01 DIAGNOSIS — E03.9 ACQUIRED HYPOTHYROIDISM: Chronic | ICD-10-CM

## 2025-04-01 DIAGNOSIS — R73.01 IMPAIRED FASTING GLUCOSE: Chronic | ICD-10-CM

## 2025-04-01 DIAGNOSIS — T45.1X5A ANEMIA ASSOCIATED WITH CHEMOTHERAPY: Chronic | ICD-10-CM

## 2025-04-01 DIAGNOSIS — M85.89 OSTEOPENIA OF MULTIPLE SITES: Chronic | ICD-10-CM

## 2025-04-01 DIAGNOSIS — R10.9 LEFT LATERAL ABDOMINAL PAIN: ICD-10-CM

## 2025-04-01 DIAGNOSIS — N39.41 URGE INCONTINENCE OF URINE: ICD-10-CM

## 2025-04-01 DIAGNOSIS — J30.9 ALLERGIC RHINITIS, UNSPECIFIED SEASONALITY, UNSPECIFIED TRIGGER: Chronic | ICD-10-CM

## 2025-04-01 DIAGNOSIS — Z00.00 MEDICARE ANNUAL WELLNESS VISIT, SUBSEQUENT: Primary | ICD-10-CM

## 2025-04-01 DIAGNOSIS — R05.2 SUBACUTE COUGH: ICD-10-CM

## 2025-04-01 RX ORDER — AMITRIPTYLINE HYDROCHLORIDE 10 MG/1
10 TABLET ORAL NIGHTLY
Qty: 90 TABLET | Refills: 3 | Status: SHIPPED | OUTPATIENT
Start: 2025-04-01

## 2025-04-01 RX ORDER — TOLTERODINE 4 MG/1
4 CAPSULE, EXTENDED RELEASE ORAL DAILY PRN
Qty: 30 CAPSULE | Refills: 0 | Status: SHIPPED | OUTPATIENT
Start: 2025-04-01
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
## 2025-04-01 NOTE — PROGRESS NOTES
ANNUAL WELLNESS VISIT    DRUG AND ALCOHOL USE      no alcohol use, no tobacco use, and low caffeine use - drinks decaff coffee (notes breast soreness if too much caffeine)    DIET AND PHYSICAL ACTIVITY     Diet: general    Exercise: infrequently   Exercise Details: walking,stretching, strength training     MOOD DISORDER AND COGNITIVE SCREENING     PHQ-2 Depression Screening - components reviewed with patient; screening is negative for active depression.    Little interest or pleasure in doing things? Not at all   Feeling down, depressed, or hopeless? Not at all   PHQ-2 Total Score 0       Anxiety Screening Tool Used YES     AUDIT screening  0     Mini-Cog Performed   Yes    1. Tell Patient 3 Words Fan blue cold    2. Administer Clock Test normal    3. Recall 3 words  Fan blue cold    4. Number Correct Items 3    FUNCTIONAL ABILITY AND LEVEL OF SAFETY   Hearing no hearing loss     Wears Hearing Aids No       Current Activities Independent      none  - see Funct/Cog Status Intake     Fall Risk Assessment       Has difficulty with walking or balance  No         Timed Up and Go (TUG) Test  8 sec.       If >12 sec, normal    ADVANCED DIRECTIVE  Advance Care Planning   ACP discussion was held with the patient during this visit. Patient has an advance directive in EMR which is still valid.       PAIN SCREENING Do you have pain right now? no      If so, 1-10 scale: 0          Do you have pain every day? No      Probable chronic pain: No     Recent Hospitalizations:  No hospitalization(s) within the last year..     MEDICATION REVIEW   - updated and reviewed (see Medication List).   - reviewed for potentially harmful drug-disease interactions in the elderly.   - reviewed for high risk medications in the elderly.   - aspirin use: No    BMI  Body mass index is 23.49 kg/m².  BMI is within normal parameters. No other follow-up for BMI required.  _______________________________________________    Chief Complaint   Patient  "presents with    Medicare Wellness-subsequent    Hyperlipidemia    Impaired fasting glucose       History of Present Illness  74 y.o.  female presents for updated phys examination and wellness visit as well as f/u on cholesterol, sugars, and thyroid.    Not checking BPs.    Verbalizes need to resume treadmill exercise. Also uses inversion table.    Reports mild cough with phlegm increased over the last month; attributed it to allergies, working on farm.  Reports Dr. Lowery noted crackles in her lungs when breasts were checked in 12/2024. Denies SOB, wheezing, coughing up blood, TEJADA.  Uses sinus rinse BID.    Requesting RF tolterodine; notes increased travel visiting [employer] [place]    Abd pain stable on amitriptyline; no s/e.    Reports episodes of arthritis pains in her hands, then mild urinary sxs, resolved with Azo.    Review of Systems  Denies headaches, visual changes, CP, palpitations, SOB/TEJADA, wheezing,  abd pain, n/v/d, difficulty with urination, numbness/tingling, falls, mood changes, lightheadedness, hearing changes, rashes.  Denies vaginal discharge or bleeding (no periods); stable bilat breast soreness. Bruise on R breast when stick hit her on the farm.  ROS (+) for cough with phlegm, increased over the last month; denies hemoptysis.    All other ROS reviewed and negative.    Current Outpatient Medications:     acetaminophen (TYLENOL) 325 MG prn    amitriptyline 10 MG QHS    cetirizine 10 MG QD    famotidine  20 MG QD    multivitamin QD    prochlorperazine 10 MG prn    psyllium (METAMUCIL) 0.52 g QD    tolterodine LA 4 MG QD prn travel    OPIOID SCREENING:  The patient does not have opioid prescription on her medication list but note potential sedating effects of amitriptyline. Medication list has been reviewed with the patient regarding potentially high risk medications and harmful drug interactions in patients over age 65.    VITALS:  /64   Pulse 80   Ht 156.8 cm (61.75\")   " Wt 57.8 kg (127 lb 6.4 oz)   SpO2 97%   BMI 23.49 kg/m²     Physical Exam  Vitals and nursing note reviewed.   Constitutional:       General: She is not in acute distress.     Appearance: Normal appearance. She is well-developed. She is not ill-appearing.   HENT:      Head: Normocephalic.      Right Ear: Tympanic membrane, ear canal and external ear normal. There is no impacted cerumen.      Left Ear: Tympanic membrane, ear canal and external ear normal. There is no impacted cerumen.      Nose: Nose normal.   Eyes:      Extraocular Movements: Extraocular movements intact.      Conjunctiva/sclera: Conjunctivae normal.      Pupils: Pupils are equal, round, and reactive to light.   Neck:      Vascular: No carotid bruit (bilaterally).   Cardiovascular:      Rate and Rhythm: Normal rate and regular rhythm.      Heart sounds: Normal heart sounds.   Pulmonary:      Effort: Pulmonary effort is normal. No respiratory distress.      Breath sounds: Rales (bibasilar crackles) present. No wheezing or rhonchi.   Abdominal:      General: Bowel sounds are normal. There is no distension.      Palpations: Abdomen is soft. There is no mass.      Tenderness: There is no abdominal tenderness. There is no guarding or rebound.   Genitourinary:     Comments: Chaperone declined by patient.    Breast exam with mild fibrocystic changes, scar tissues left breast upper outer quadrant,resolving ecchymosis right breast lower outer quadrant, otherwise unremarkable without masses, skin changes, nipple discharge, or axillary adenopathy.      Musculoskeletal:      Cervical back: Normal range of motion and neck supple.      Right lower leg: No edema.      Left lower leg: No edema.   Lymphadenopathy:      Cervical: No cervical adenopathy.   Skin:     General: Skin is warm and dry.      Findings: No rash.   Neurological:      Mental Status: She is alert and oriented to person, place, and time.      Gait: Gait normal.   Psychiatric:         Mood and  Affect: Mood normal.         Behavior: Behavior normal.         LABS  Results for orders placed or performed in visit on 03/25/25   Comprehensive Metabolic Panel    Collection Time: 03/25/25 11:28 AM    Specimen: Blood   Result Value Ref Range    Glucose 85 65 - 99 mg/dL    BUN 12 8 - 23 mg/dL    Creatinine 0.54 (L) 0.57 - 1.00 mg/dL    Sodium 132 (L) 136 - 145 mmol/L    Potassium 3.9 3.5 - 5.2 mmol/L    Chloride 95 (L) 98 - 107 mmol/L    CO2 25.9 22.0 - 29.0 mmol/L    Calcium 9.0 8.6 - 10.5 mg/dL    Total Protein 6.6 6.0 - 8.5 g/dL    Albumin 4.1 3.5 - 5.2 g/dL    ALT (SGPT) 14 1 - 33 U/L    AST (SGOT) 23 1 - 32 U/L    Alkaline Phosphatase 65 39 - 117 U/L    Total Bilirubin 0.4 0.0 - 1.2 mg/dL    Globulin 2.5 gm/dL    A/G Ratio 1.6 g/dL    BUN/Creatinine Ratio 22.2 7.0 - 25.0    Anion Gap 11.1 5.0 - 15.0 mmol/L    eGFR 95.6 >60.0 mL/min/1.73   Lipid Panel    Collection Time: 03/25/25 11:28 AM    Specimen: Blood   Result Value Ref Range    Total Cholesterol 209 (H) 0 - 200 mg/dL    Triglycerides 47 0 - 150 mg/dL    HDL Cholesterol 77 (H) 40 - 60 mg/dL    LDL Cholesterol  123 (H) 0 - 100 mg/dL    VLDL Cholesterol 9 5 - 40 mg/dL    LDL/HDL Ratio 1.59    TSH    Collection Time: 03/25/25 11:28 AM    Specimen: Blood   Result Value Ref Range    TSH 3.540 0.270 - 4.200 uIU/mL   Hemoglobin A1c    Collection Time: 03/25/25 11:28 AM    Specimen: Blood   Result Value Ref Range    Hemoglobin A1C 5.60 4.80 - 5.60 %   T4, Free    Collection Time: 03/25/25 11:28 AM    Specimen: Blood   Result Value Ref Range    Free T4 1.25 0.92 - 1.68 ng/dL   CBC Auto Differential    Collection Time: 03/25/25 11:28 AM    Specimen: Blood   Result Value Ref Range    WBC 6.95 3.40 - 10.80 10*3/mm3    RBC 3.61 (L) 3.77 - 5.28 10*6/mm3    Hemoglobin 11.2 (L) 12.0 - 15.9 g/dL    Hematocrit 34.6 34.0 - 46.6 %    MCV 95.8 79.0 - 97.0 fL    MCH 31.0 26.6 - 33.0 pg    MCHC 32.4 31.5 - 35.7 g/dL    RDW 13.1 12.3 - 15.4 %    RDW-SD 45.6 37.0 - 54.0 fl    MPV  9.8 6.0 - 12.0 fL    Platelets 352 140 - 450 10*3/mm3    Neutrophil % 58.6 42.7 - 76.0 %    Lymphocyte % 27.6 19.6 - 45.3 %    Monocyte % 7.5 5.0 - 12.0 %    Eosinophil % 4.9 0.3 - 6.2 %    Basophil % 1.0 0.0 - 1.5 %    Immature Grans % 0.4 0.0 - 0.5 %    Neutrophils, Absolute 4.07 1.70 - 7.00 10*3/mm3    Lymphocytes, Absolute 1.92 0.70 - 3.10 10*3/mm3    Monocytes, Absolute 0.52 0.10 - 0.90 10*3/mm3    Eosinophils, Absolute 0.34 0.00 - 0.40 10*3/mm3    Basophils, Absolute 0.07 0.00 - 0.20 10*3/mm3    Immature Grans, Absolute 0.03 0.00 - 0.05 10*3/mm3    nRBC 0.0 0.0 - 0.2 /100 WBC   Urinalysis without microscopic (no culture) - Urine, Clean Catch    Collection Time: 03/25/25 11:28 AM    Specimen: Urine, Clean Catch   Result Value Ref Range    Color, UA Yellow Yellow, Straw    Appearance, UA Clear Clear    pH, UA 7.0 5.0 - 8.0    Specific Gravity, UA 1.009 1.005 - 1.030    Glucose, UA Negative Negative    Ketones, UA Negative Negative    Bilirubin, UA Negative Negative    Blood, UA Negative Negative    Protein, UA Negative Negative    Leuk Esterase, UA Moderate (2+) (A) Negative    Nitrite, UA Negative Negative    Urobilinogen, UA 0.2 E.U./dL 0.2 - 1.0 E.U./dL   Urinalysis, Microscopic Only - Urine, Clean Catch    Collection Time: 03/25/25 11:28 AM    Specimen: Urine, Clean Catch   Result Value Ref Range    RBC, UA 0-2 None Seen, 0-2 /HPF    WBC, UA 21-50 (A) None Seen, 0-2 /HPF    Bacteria, UA 4+ (A) None Seen /HPF    Squamous Epithelial Cells, UA None Seen None Seen, 0-2 /HPF    Hyaline Casts, UA 0-2 None Seen /LPF    Methodology Automated Microscopy      9/16/24 H&H 11.8/35.2, A1C 5.9    3/18/24 LDL 93      ECG 12 Lead    Date/Time: 4/1/2025 8:15 AM  Performed by: Mirna Stack MD    Authorized by: Mirna Stack MD  Comparison: compared with previous ECG from 3/2/2024  Similar to previous ECG  Rhythm: sinus rhythm  Rate: normal  BPM: 73  Conduction: conduction normal  ST Segments: ST segments normal  T Waves: T  waves normal  QRS axis: normal  Clinical impression comment: stable EKG        ASSESSMENT/PLAN    Diagnoses and all orders for this visit:    1. Medicare annual wellness visit, subsequent (Primary)  Assessment & Plan:  Health maintenance - COVID19 vacc 9/24; flu vacc 10/24; RSV 9/23; Prevnar 2/16, PVX 2/14, Tdap 5/22 (next Tdap due 2032), HAV and Shingrix done; mammo [facility] 6/18/24, L dx 12/24, dx in 1 yr; no further Paps; DEXA 4/23, repeat 2026, colonosc 4/16, repeat 2026 per Dr. Knight (notes (+) colon CA gene), NEG cologuard 11/8/21; neg AAA 2/14; eye exam w/ Dr. Gerson NEVAREZ (few mos ago per pt); dental exam q6 mos; (+) seat belt use    Consultants:  Patient Care Team:  Mirna Stack MD as PCP - Nika Easton MD as Consulting Physician (Gastroenterology)  Oliver Resendiz MD as Consulting Physician (Medical Oncology)  Delmis Parker MD as Consulting Physician (Dermatology)  Jono Dawson MD (Surgical Oncology)  Holden Knight MD as Consulting Physician (Gastroenterology)  Daniel Rosen DPM (Podiatry)  Brandon John MD as Consulting Physician (Allergy)  Jake Cornelius MD as Consulting Physician (Otolaryngology)  Colt Nieto, LEFTY (Optometry)  David Davis MD as Consulting Physician (Ophthalmology)  Nettie Ribeiro MD as Consulting Physician (Rheumatology)  Bri Llamas DDS (Dental General Practice)  Quintin Borden MD as Consulting Physician (Rheumatology)  Bony Valdez APRN as Nurse Practitioner (Obstetrics and Gynecology)  Edwardo Diana MD as Consulting Physician (Orthopedic Surgery)  Farida Singh MD (Surgical Oncology)  Dominique Lowery MD as Consulting Physician (Hematology and Oncology)        2. Hyperlipidemia  Assessment & Plan:   Lipids increased, bord with ; goal LDL < 130, ideally < 100; no meds    Orders:  -     ECG 12 Lead    3. Impaired fasting glucose  Assessment & Plan:  BG control  stable with A1C 5.6; encouraged reg phys activity to decr insulin resistance, moderation in unhealthy starches/sweets; f/u A1C in 6 mos        4. Hypothyroidism  Assessment & Plan:  Euthyroid; no meds      5. Osteopenia  Assessment & Plan:  Calcium and vitamin D supplementation with weight-bearing exercise; DEXA 4/23, repeat 2026        6. Left lateral abdominal pain  Assessment & Plan:  Asx; cont amitriptyline 10mg QHS #90, 3RF    Orders:  -     amitriptyline (ELAVIL) 10 MG tablet; Take 1 tablet by mouth Every Night.  Dispense: 90 tablet; Refill: 3    7. Anemia associated with chemotherapy  Assessment & Plan:  Stable blood counts, borderline anemia      8. Urge incontinence of urine  Assessment & Plan:  Refill Detrol LA 4 mg with travel; RX #30, 0RF; note dysuria sxs assoc'd with arthritis pains (hands), recent episode resolved with Azo    Orders:  -     tolterodine LA (DETROL LA) 4 MG 24 hr capsule; Take 1 capsule by mouth Daily As Needed (over active bladder, only takes when traveling).  Dispense: 30 capsule; Refill: 0    9. Hyponatremia  Comments:  NEW dx with bord Na 132; repeat BMP whe non-fasting  Orders:  -     Basic Metabolic Panel; Future    10. Subacute cough  Comments:  likely d/d PNT but NEW bibasilar crackles > 3 mos, unknown progrnosis;  therefore, check CXR  Orders:  -     XR Chest PA & Lateral; Future    11. Allergic rhinitis, unspecified seasonality, unspecified trigger  Assessment & Plan:  Note 1 month cough, likely d/t PND; cont nasal saline rinses BID and prn cetirizine          FOLLOW-UP  Repeat BMP for f/u hypoNa  CXR ordered for further eval of bibasilar crackles  RTC 6 mos with A1C    Electronically signed by:    Mirna Stack MD, FACP  04/01/2025

## 2025-04-01 NOTE — ASSESSMENT & PLAN NOTE
Refill Detrol LA 4 mg with travel; RX #30, 0RF; note dysuria sxs assoc'd with arthritis pains (hands), recent episode resolved with Azo

## 2025-04-02 ENCOUNTER — RESULTS FOLLOW-UP (OUTPATIENT)
Dept: INTERNAL MEDICINE | Facility: CLINIC | Age: 75
End: 2025-04-02
Payer: MEDICARE

## 2025-04-02 ENCOUNTER — HOSPITAL ENCOUNTER (OUTPATIENT)
Dept: GENERAL RADIOLOGY | Facility: HOSPITAL | Age: 75
Discharge: HOME | End: 2025-04-02
Payer: MEDICARE

## 2025-04-02 ENCOUNTER — LAB (OUTPATIENT)
Dept: LAB | Facility: HOSPITAL | Age: 75
End: 2025-04-02
Payer: MEDICARE

## 2025-04-02 DIAGNOSIS — R05.2 SUBACUTE COUGH: ICD-10-CM

## 2025-04-02 DIAGNOSIS — E87.1 HYPONATREMIA: ICD-10-CM

## 2025-04-02 LAB
ANION GAP SERPL CALCULATED.3IONS-SCNC: 10.2 MMOL/L (ref 5–15)
BUN SERPL-MCNC: 15 MG/DL (ref 8–23)
BUN/CREAT SERPL: 23.4 (ref 7–25)
CALCIUM SPEC-SCNC: 9.6 MG/DL (ref 8.6–10.5)
CHLORIDE SERPL-SCNC: 99 MMOL/L (ref 98–107)
CO2 SERPL-SCNC: 24.8 MMOL/L (ref 22–29)
CREAT SERPL-MCNC: 0.64 MG/DL (ref 0.57–1)
EGFRCR SERPLBLD CKD-EPI 2021: 91.7 ML/MIN/1.73
GLUCOSE SERPL-MCNC: 114 MG/DL (ref 65–99)
POTASSIUM SERPL-SCNC: 4.6 MMOL/L (ref 3.5–5.2)
SODIUM SERPL-SCNC: 134 MMOL/L (ref 136–145)

## 2025-04-02 PROCEDURE — 80048 BASIC METABOLIC PNL TOTAL CA: CPT

## 2025-04-02 PROCEDURE — 71046 X-RAY EXAM CHEST 2 VIEWS: CPT

## 2025-04-14 ENCOUNTER — PATIENT MESSAGE (OUTPATIENT)
Dept: INTERNAL MEDICINE | Facility: CLINIC | Age: 75
End: 2025-04-14
Payer: MEDICARE

## 2025-04-14 DIAGNOSIS — R93.89 ABNORMAL CHEST X-RAY: ICD-10-CM

## 2025-04-14 DIAGNOSIS — R05.2 SUBACUTE COUGH: Primary | ICD-10-CM

## 2025-04-22 ENCOUNTER — HOSPITAL ENCOUNTER (OUTPATIENT)
Dept: CT IMAGING | Facility: HOSPITAL | Age: 75
Discharge: HOME | End: 2025-04-22
Admitting: INTERNAL MEDICINE
Payer: MEDICARE

## 2025-04-22 DIAGNOSIS — R05.2 SUBACUTE COUGH: ICD-10-CM

## 2025-04-22 DIAGNOSIS — R93.89 ABNORMAL CHEST X-RAY: ICD-10-CM

## 2025-04-22 PROCEDURE — 71250 CT THORAX DX C-: CPT

## 2025-05-04 ENCOUNTER — RESULTS FOLLOW-UP (OUTPATIENT)
Dept: INTERNAL MEDICINE | Facility: CLINIC | Age: 75
End: 2025-05-04
Payer: MEDICARE

## 2025-05-04 DIAGNOSIS — R05.3 CHRONIC COUGH: ICD-10-CM

## 2025-05-04 DIAGNOSIS — J84.9 INTERSTITIAL LUNG DISEASE: Primary | ICD-10-CM

## 2025-06-25 ENCOUNTER — OFFICE VISIT (OUTPATIENT)
Dept: PULMONOLOGY | Facility: CLINIC | Age: 75
End: 2025-06-25
Payer: MEDICARE

## 2025-06-25 VITALS
WEIGHT: 124 LBS | OXYGEN SATURATION: 97 % | BODY MASS INDEX: 22.82 KG/M2 | HEART RATE: 91 BPM | SYSTOLIC BLOOD PRESSURE: 138 MMHG | DIASTOLIC BLOOD PRESSURE: 70 MMHG | HEIGHT: 62 IN

## 2025-06-25 DIAGNOSIS — J84.9 INTERSTITIAL LUNG DISEASE: Primary | ICD-10-CM

## 2025-06-25 DIAGNOSIS — Z17.1 MALIGNANT NEOPLASM OF UPPER-OUTER QUADRANT OF LEFT BREAST IN FEMALE, ESTROGEN RECEPTOR NEGATIVE: Chronic | ICD-10-CM

## 2025-06-25 DIAGNOSIS — C50.412 MALIGNANT NEOPLASM OF UPPER-OUTER QUADRANT OF LEFT BREAST IN FEMALE, ESTROGEN RECEPTOR NEGATIVE: Chronic | ICD-10-CM

## 2025-06-25 DIAGNOSIS — Z14.8 ALPHA-1-ANTITRYPSIN DEFICIENCY CARRIER: Chronic | ICD-10-CM

## 2025-06-25 RX ORDER — AMOXICILLIN 500 MG/1
CAPSULE ORAL
COMMUNITY
Start: 2025-05-13

## 2025-06-25 NOTE — PROGRESS NOTES
PULMONARY  NOTE    Chief Complaint     Abnormal chest CT, history of breast cancer, non-smoker    History of Present Illness     74-year-old female referred for abnormal chest imaging    She is a non-smoker with no past history of known lung disease    She has been noted on several occasions over the last year to have crackles on lung examination  This resulted in a chest x-ray followed by a CT scan  Those results are as noted below    Other than a URI earlier this year she typically does not have cough  She is never had hemoptysis    She does not feel limited by dyspnea  She is on no respiratory medications    She has never had reflux symptoms in the past  She does take Pepcid chronically for the antihistamine effects.    She has been evaluated by rheumatology in the past, Dr. Borden  She is felt to have osteoarthritis, no lupus or rheumatoid arthritis    To her recollection she has not been on any offending drugs such as nitrofurantoin/Macrobid    She does mow grass and likes to work in her garden otherwise no other significant exposure history    Patient Active Problem List   Diagnosis    Allergic rhinitis    Hypothyroidism    Digital mucinous cyst    Fibrocystic breast changes    Heart murmur    Hyperlipidemia    Impaired fasting glucose    Left lateral abdominal pain    Breast pain    Menopause    Osteoarthritis of both hips    Osteopenia    Diverticulosis of colon    Encounter for chronic pain management    Urge incontinence of urine    Right SNHL    GERD (gastroesophageal reflux disease)    Hyperplastic polyp of descending colon    Alpha-1-antitrypsin deficiency carrier    History of right hip replacement    Family history of Alzheimer's disease    Unintentional weight loss    Early dry stage nonexudative age-related macular degeneration of right eye    Malignant neoplasm of upper-outer quadrant of left breast in female, estrogen receptor negative    Irritable bowel syndrome with diarrhea    Anemia  associated with chemotherapy    Benign paroxysmal positional vertigo of left ear    Osteoarthritis    Generalized osteoarthrosis, involving multiple sites    Medicare annual wellness visit, subsequent    Interstitial lung disease (reticulation only)      Allergies   Allergen Reactions    Erythromycin Other (See Comments)     Chest pain    Sulfa Antibiotics Rash       Current Outpatient Medications:     acetaminophen (TYLENOL) 325 MG tablet, Take 2 tablets by mouth Every 6 (Six) Hours As Needed., Disp: , Rfl:     amitriptyline (ELAVIL) 10 MG tablet, Take 1 tablet by mouth Every Night., Disp: 90 tablet, Rfl: 3    amoxicillin (AMOXIL) 500 MG capsule, , Disp: , Rfl:     cetirizine (zyrTEC) 10 MG tablet, Take 1 tablet by mouth Daily As Needed for Allergies. With infusions, Disp: , Rfl:     famotidine (PEPCID) 20 MG tablet, Take 1 tablet by mouth Daily As Needed for Heartburn. With infusions, Disp: , Rfl:     multivitamin with minerals tablet tablet, Take 1 tablet by mouth Daily., Disp: , Rfl:     tolterodine LA (DETROL LA) 4 MG 24 hr capsule, Take 1 capsule by mouth Daily As Needed (over active bladder, only takes when traveling)., Disp: 30 capsule, Rfl: 0    prochlorperazine (COMPAZINE) 10 MG tablet, Take  by mouth. (Patient not taking: Reported on 6/25/2025), Disp: , Rfl:     psyllium (METAMUCIL) 0.52 g capsule, Take 1 capsule by mouth Daily As Needed for Constipation. (Patient not taking: Reported on 6/25/2025), Disp: , Rfl:   MEDICATION LIST AND ALLERGIES REVIEWED.    Family History   Problem Relation Age of Onset    Ovarian cancer Mother 85    Arthritis Mother     Hyperlipidemia Mother     Osteoarthritis Mother     Alzheimer's disease Father     Goiter Sister     Heart attack Sister         tob    Stroke Sister     Alpha-1 antitrypsin deficiency Sister         carrier    Arthritis Sister     Hyperlipidemia Sister     Alzheimer's disease Sister     Thyroid nodules Sister     Rheum arthritis Sister     Alpha-1  "antitrypsin deficiency Sister         carrier    Ovarian cancer Sister 51    Hypothyroidism Sister     Breast cancer Sister 65        triple neg    Osteoarthritis Sister     Heart attack Brother     Breast cancer Maternal Aunt     Alpha-1 antitrypsin deficiency Grandchild         carrier     Social History     Tobacco Use    Smoking status: Never     Passive exposure: Never    Smokeless tobacco: Never   Vaping Use    Vaping status: Never Used   Substance Use Topics    Alcohol use: Never    Drug use: Never     Social History     Social History Narrative    5 sisters and 4 brothers; patient is #7    Non-smoker with no vaping or inhalation use     FAMILY AND SOCIAL HISTORY REVIEWED.    Review of Systems  IF PRESENT REFER TO SCANNED ROS SHEET FROM SAME DATE  OTHERWISE ROS OBTAINED AND NON-CONTRIBUTORY OVER HPI.    /70   Pulse 91   Ht 157.5 cm (62\")   Wt 56.2 kg (124 lb)   SpO2 97%   BMI 22.68 kg/m²   Physical Exam  Vitals and nursing note reviewed.   Constitutional:       General: She is not in acute distress.     Appearance: She is well-developed. She is not diaphoretic.   HENT:      Head: Normocephalic and atraumatic.   Neck:      Thyroid: No thyromegaly.   Cardiovascular:      Rate and Rhythm: Normal rate and regular rhythm.      Heart sounds: Normal heart sounds. No murmur heard.  Pulmonary:      Effort: Pulmonary effort is normal.      Breath sounds: No stridor.      Comments: Scattered coarse crackles primarily in the bases, no wheezes  Lymphadenopathy:      Cervical: No cervical adenopathy.      Upper Body:      Right upper body: No supraclavicular or epitrochlear adenopathy.      Left upper body: No supraclavicular or epitrochlear adenopathy.   Skin:     General: Skin is warm and dry.   Neurological:      Mental Status: She is alert.   Psychiatric:         Behavior: Behavior normal.         Results     PFTs reveal no obstruction, no restriction, normal diffusion capacit    CT scan of the chest " revealed scattered areas of reticulation  With no traction bronchiectasis or honeycombing  No old CT scans of the chest available for comparison    Immunization History   Administered Date(s) Administered    COVID-19 (PFIZER) 12YRS+ (COMIRNATY) 01/08/2021, 02/05/2021, 10/10/2023, 03/25/2024, 09/16/2024    COVID-19 (PFIZER) BIVALENT 12+YRS 09/10/2022    COVID-19 (PFIZER) Purple Cap Monovalent 01/26/2021, 02/16/2021, 08/14/2021    Covid-19 (Pfizer) Gray Cap Monovalent 04/01/2022    FluMist 2-49yrs 10/30/2013, 10/14/2014, 10/22/2015    Fluad Quad 65+ 09/10/2022, 09/13/2023    Fluzone High-Dose 65+YRS 10/24/2016, 10/12/2017, 09/01/2019    Fluzone High-Dose 65+yrs 09/17/2021, 10/06/2024    Hepatitis A 12/17/2018, 06/15/2019    INFLUENZA SPLIT TRI 08/31/2020, 09/17/2021    Influenza, Unspecified 08/27/2020, 09/17/2021    Pneumococcal Conjugate 13-Valent (PCV13) 02/22/2016    Pneumococcal Polysaccharide (PPSV23) 02/13/2014    RSV Vaccine, Unspecified 09/13/2023    Shingrix 03/05/2019, 06/01/2019    Tdap 01/01/2012, 05/07/2022    Zostavax 01/09/2016     Problem List       ICD-10-CM ICD-9-CM   1. Interstitial lung disease (Reticulation only)  J84.9 515   2. Malignant neoplasm of upper-outer quadrant of left breast in female, estrogen receptor negative  C50.412 174.4    Z17.1 V86.1   3. Alpha-1-antitrypsin deficiency carrier  Z14.8 V83.89       Discussion     We reviewed her chest imaging together on PACS  She also has had some CT scans of the abdomen and pelvis dating back to 2017  She did have a little bit of reticulation on those scans, as well    The significance of the reticulation is unclear  She has normal lung function  This may be related to her prior radiation therapy but it seems to be somewhat patchy and scattered in several lobes and does not appear to follow a typical port distribution    She may have reflux as a contributing factor but chronically takes Pepcid for its antihistamine effects    She does not  appear to have been on any offending medications, such as nitrofurantoin    She has been evaluated by rheumatology in the past and is only felt to have osteoarthritis.    At this point of just recommended serial PFTs  I will see her back in 6 months with repeat PFTs  If we see that there is any decline in lung function then we will pursue further workup which primarily would represent an autoimmune workup which has already been done in the past albeit several years ago    She carries a diagnosis of alpha 1 antitrypsin deficiency carrier but I cannot find the test results in the chart anywhere  Am not sure that that is pertinent to her current situation.    I will see her back in 6 months or earlier if there are any worsening symptoms    Level of service justified based on 62 minutes spent in patient care on this date of service including, but not limited to: preparing to see the patient, obtaining and/or reviewing history, performing medically appropriate examination, ordering tests/medicine/procedures, independently interpreting results, documenting clinical information in EHR, and counseling/education of patient/family/caregiver (excluding time spent on other separate services such as performing procedures or test interpretation, if applicable). (Level 4 45-59 minutes; Level 5 60-74 minutes)    Chandrakant Myers MD  Note electronically signed    CC: Mirna Stack MD

## 2025-06-27 NOTE — TELEPHONE ENCOUNTER
Patient advised of results.   
Patient's potassium is elevated, held lisinopril and multivitamin.  
No

## 2025-08-11 ENCOUNTER — OFFICE VISIT (OUTPATIENT)
Age: 75
End: 2025-08-11
Payer: MEDICARE

## 2025-08-11 VITALS
HEIGHT: 62 IN | SYSTOLIC BLOOD PRESSURE: 102 MMHG | WEIGHT: 122.4 LBS | BODY MASS INDEX: 22.52 KG/M2 | HEART RATE: 84 BPM | DIASTOLIC BLOOD PRESSURE: 70 MMHG | TEMPERATURE: 97.3 F

## 2025-08-11 DIAGNOSIS — Z79.1 NSAID LONG-TERM USE: ICD-10-CM

## 2025-08-11 DIAGNOSIS — M15.9 GENERALIZED OSTEOARTHROSIS, INVOLVING MULTIPLE SITES: Primary | ICD-10-CM

## 2025-08-11 RX ORDER — DICLOFENAC SODIUM 75 MG/1
75 TABLET, DELAYED RELEASE ORAL DAILY PRN
Qty: 30 TABLET | Refills: 6 | Status: SHIPPED | OUTPATIENT
Start: 2025-08-11 | End: 2025-08-11 | Stop reason: SDUPTHER

## 2025-08-11 RX ORDER — DICLOFENAC SODIUM 75 MG/1
75 TABLET, DELAYED RELEASE ORAL DAILY PRN
Qty: 30 TABLET | Refills: 6 | Status: SHIPPED | OUTPATIENT
Start: 2025-08-11

## (undated) DEVICE — DRSNG SURG AQUACEL AG 9X25CM

## (undated) DEVICE — PEG PAD FOR SURG DISP

## (undated) DEVICE — ELECTRD BLD EZ CLN STD 2.5IN

## (undated) DEVICE — BLANKT WARM UPPR/BDY ARM/OUT 57X196CM

## (undated) DEVICE — UNDERGLV SURG BIOGEL INDICAT PI SZ8 BLU

## (undated) DEVICE — STRYKER PERFORMANCE SERIES SAGITTAL BLADE: Brand: STRYKER PERFORMANCE SERIES

## (undated) DEVICE — SUT VIC 1 CTX 36IN OBGYN VCP977H

## (undated) DEVICE — GLV SURG SENSICARE PI MIC PF SZ9 LF STRL

## (undated) DEVICE — DRAPE,REIN 53X77,STERILE: Brand: MEDLINE

## (undated) DEVICE — BIT DRL 3.3X25MM DISP

## (undated) DEVICE — TB SXN FRAZIER 12F STRL

## (undated) DEVICE — PK HIP TOTL UNIV 10

## (undated) DEVICE — COATED BRAIDED POLYESTER: Brand: TI-CRON

## (undated) DEVICE — ANTIBACTERIAL UNDYED BRAIDED (POLYGLACTIN 910), SYNTHETIC ABSORBABLE SUTURE: Brand: COATED VICRYL

## (undated) DEVICE — SYR LUERLOK 30CC

## (undated) DEVICE — HEWSON SUTURE RETRIEVER: Brand: HEWSON SUTURE RETRIEVER

## (undated) DEVICE — GLV SURG SENSICARE PI ORTHO SZ8 LF STRL

## (undated) DEVICE — BNDG ELAS CO-FLEX SLF ADHR 6IN 5YD LF STRL

## (undated) DEVICE — ELECTRD BLD EZ CLN STD 6.5IN

## (undated) DEVICE — STERILE PVP: Brand: MEDLINE INDUSTRIES, INC.

## (undated) DEVICE — ADHS SKIN PREMIERPRO EXOFIN TOPICAL HI/VISC .5ML

## (undated) DEVICE — NEEDLE, QUINCKE 22GX3.5": Brand: MEDLINE INDUSTRIES, INC.

## (undated) DEVICE — SUT MONOCRYL PLS ANTIB UND 3/0  PS1 27IN

## (undated) DEVICE — PILLW ABD MD

## (undated) DEVICE — PENCL ROCKRSWCH MEGADYNE W/HOLSTR 10FT SS

## (undated) DEVICE — PATIENT RETURN ELECTRODE, SINGLE-USE, CONTACT QUALITY MONITORING, ADULT, WITH 9FT CORD, FOR PATIENTS WEIGING OVER 33LBS. (15KG): Brand: MEGADYNE

## (undated) DEVICE — SST TWIST DRILL, STANDARD, 2.4MM DIA. X 127MM: Brand: MICROAIRE®